# Patient Record
Sex: FEMALE | Race: WHITE | NOT HISPANIC OR LATINO | Employment: OTHER | URBAN - METROPOLITAN AREA
[De-identification: names, ages, dates, MRNs, and addresses within clinical notes are randomized per-mention and may not be internally consistent; named-entity substitution may affect disease eponyms.]

---

## 2017-01-11 DIAGNOSIS — I25.5 ISCHEMIC CARDIOMYOPATHY: ICD-10-CM

## 2017-01-11 LAB
ANION GAP SERPL CALCULATED.3IONS-SCNC: 9 MMOL/L (ref 3–14)
BUN SERPL-MCNC: 20 MG/DL (ref 7–30)
CALCIUM SERPL-MCNC: 9.6 MG/DL (ref 8.5–10.1)
CHLORIDE SERPL-SCNC: 103 MMOL/L (ref 94–109)
CO2 SERPL-SCNC: 27 MMOL/L (ref 20–32)
CREAT SERPL-MCNC: 0.83 MG/DL (ref 0.52–1.04)
GFR SERPL CREATININE-BSD FRML MDRD: 70 ML/MIN/1.7M2
GLUCOSE SERPL-MCNC: 165 MG/DL (ref 70–99)
POTASSIUM SERPL-SCNC: 3.8 MMOL/L (ref 3.4–5.3)
SODIUM SERPL-SCNC: 139 MMOL/L (ref 133–144)

## 2017-01-11 PROCEDURE — 36415 COLL VENOUS BLD VENIPUNCTURE: CPT | Performed by: INTERNAL MEDICINE

## 2017-01-11 PROCEDURE — 80048 BASIC METABOLIC PNL TOTAL CA: CPT | Performed by: INTERNAL MEDICINE

## 2017-03-27 ENCOUNTER — HOSPITAL ENCOUNTER (OUTPATIENT)
Dept: CARDIOLOGY | Facility: CLINIC | Age: 62
Discharge: HOME | End: 2017-03-27
Attending: INTERNAL MEDICINE | Admitting: INTERNAL MEDICINE
Payer: OTHER GOVERNMENT

## 2017-03-27 DIAGNOSIS — I10 BENIGN ESSENTIAL HYPERTENSION: ICD-10-CM

## 2017-03-27 DIAGNOSIS — I21.02 ST ELEVATION MYOCARDIAL INFARCTION INVOLVING LEFT ANTERIOR DESCENDING (LAD) CORONARY ARTERY (H): ICD-10-CM

## 2017-03-27 DIAGNOSIS — I25.5 ISCHEMIC CARDIOMYOPATHY: ICD-10-CM

## 2017-03-27 LAB
ALT SERPL W P-5'-P-CCNC: 28 U/L (ref 0–50)
ANION GAP SERPL CALCULATED.3IONS-SCNC: 7 MMOL/L (ref 3–14)
BUN SERPL-MCNC: 26 MG/DL (ref 7–30)
CALCIUM SERPL-MCNC: 9.2 MG/DL (ref 8.5–10.1)
CHLORIDE SERPL-SCNC: 104 MMOL/L (ref 94–109)
CHOLEST SERPL-MCNC: 139 MG/DL
CO2 SERPL-SCNC: 28 MMOL/L (ref 20–32)
CREAT SERPL-MCNC: 0.81 MG/DL (ref 0.52–1.04)
GFR SERPL CREATININE-BSD FRML MDRD: 72 ML/MIN/1.7M2
GLUCOSE SERPL-MCNC: 113 MG/DL (ref 70–99)
HDLC SERPL-MCNC: 68 MG/DL
LDLC SERPL CALC-MCNC: 48 MG/DL
NONHDLC SERPL-MCNC: 71 MG/DL
POTASSIUM SERPL-SCNC: 4 MMOL/L (ref 3.4–5.3)
SODIUM SERPL-SCNC: 139 MMOL/L (ref 133–144)
TRIGL SERPL-MCNC: 114 MG/DL

## 2017-03-27 PROCEDURE — 36415 COLL VENOUS BLD VENIPUNCTURE: CPT | Performed by: INTERNAL MEDICINE

## 2017-03-27 PROCEDURE — 93306 TTE W/DOPPLER COMPLETE: CPT

## 2017-03-27 PROCEDURE — 80061 LIPID PANEL: CPT | Performed by: INTERNAL MEDICINE

## 2017-03-27 PROCEDURE — 93306 TTE W/DOPPLER COMPLETE: CPT | Mod: 26 | Performed by: INTERNAL MEDICINE

## 2017-03-27 PROCEDURE — 80048 BASIC METABOLIC PNL TOTAL CA: CPT | Performed by: INTERNAL MEDICINE

## 2017-03-27 PROCEDURE — 84460 ALANINE AMINO (ALT) (SGPT): CPT | Performed by: INTERNAL MEDICINE

## 2017-04-04 ENCOUNTER — OFFICE VISIT (OUTPATIENT)
Dept: CARDIOLOGY | Facility: CLINIC | Age: 62
End: 2017-04-04
Attending: INTERNAL MEDICINE
Payer: OTHER GOVERNMENT

## 2017-04-04 VITALS
BODY MASS INDEX: 31.74 KG/M2 | HEIGHT: 62 IN | WEIGHT: 172.5 LBS | DIASTOLIC BLOOD PRESSURE: 60 MMHG | SYSTOLIC BLOOD PRESSURE: 112 MMHG | HEART RATE: 72 BPM

## 2017-04-04 DIAGNOSIS — I25.5 ISCHEMIC CARDIOMYOPATHY: ICD-10-CM

## 2017-04-04 DIAGNOSIS — I21.02 ST ELEVATION MYOCARDIAL INFARCTION INVOLVING LEFT ANTERIOR DESCENDING (LAD) CORONARY ARTERY (H): ICD-10-CM

## 2017-04-04 DIAGNOSIS — I10 BENIGN ESSENTIAL HYPERTENSION: ICD-10-CM

## 2017-04-04 DIAGNOSIS — I25.10 CORONARY ARTERY DISEASE INVOLVING NATIVE CORONARY ARTERY OF NATIVE HEART WITHOUT ANGINA PECTORIS: Primary | ICD-10-CM

## 2017-04-04 PROCEDURE — 99214 OFFICE O/P EST MOD 30 MIN: CPT | Performed by: INTERNAL MEDICINE

## 2017-04-04 NOTE — PROGRESS NOTES
HPI and Plan:   See dictation    Orders Placed This Encounter   Procedures     NM Exercise stress test (nuc card)     Lipid Profile     ALT     Basic metabolic panel     Follow-Up with Cardiologist       No orders of the defined types were placed in this encounter.      Medications Discontinued During This Encounter   Medication Reason     METFORMIN HCL PO Discontinued by another Health Care Provider     Omega-3 Fatty Acids (OMEGA-3 FISH OIL PO) Discontinued by another Health Care Provider         Encounter Diagnoses   Name Primary?     ST elevation myocardial infarction involving left anterior descending (LAD) coronary artery (H)      Benign essential hypertension      Ischemic cardiomyopathy      Coronary artery disease involving native coronary artery of native heart without angina pectoris Yes       CURRENT MEDICATIONS:  Current Outpatient Prescriptions   Medication Sig Dispense Refill     losartan (COZAAR) 25 MG tablet Take 1 tablet (25 mg) by mouth daily 30 tablet 0     ticagrelor (BRILINTA) 90 MG tablet Take 1 tablet (90 mg) by mouth every 12 hours 60 tablet 3     nitroglycerin (NITROSTAT) 0.4 MG SL tablet Place 1 tablet (0.4 mg) under the tongue every 5 minutes as needed for chest pain if you are still having symptoms after 3 doses (15 minutes) call 911. 25 tablet 0     atorvastatin (LIPITOR) 80 MG tablet Take 1 tablet (80 mg) by mouth daily 30 tablet 11     carvedilol (COREG) 3.125 MG tablet Take 1 tablet (3.125 mg) by mouth 2 times daily 60 tablet 11     furosemide (LASIX) 40 MG tablet Take 1 tablet (40 mg) by mouth daily 30 tablet 1     spironolactone (ALDACTONE) 25 MG tablet Take 0.5 tablets (12.5 mg) by mouth daily 30 tablet 1     VITAMIN D, CHOLECALCIFEROL, PO Take 2,000 Units by mouth daily       Biotin 2500 MCG CAPS Take 2,500 mcg by mouth daily       Ascorbic Acid (VITAMIN C PO) Take 1,000 mg by mouth daily       magnesium 250 MG tablet Take 1 tablet by mouth daily       ASPIRIN PO Take 81 mg by  mouth daily       Probiotic Product (PROBIOTIC PO) Take 1 capsule by mouth daily         ALLERGIES     Allergies   Allergen Reactions     Cellcept [Mycophenolate]      Levaquin [Levofloxacin]      Lisinopril Cough       PAST MEDICAL HISTORY:  Past Medical History:   Diagnosis Date     Benign essential hypertension      BOOP (bronchiolitis obliterans with organizing pneumonia) (H)      Breast CA (H)      CAD (coronary artery disease), native coronary artery     STEMI 8/2016  PCI mid LAD 8/22/16     DM (diabetes mellitus screen)      Dyslipidemia      Ex-smoker      Fatty liver disease, nonalcoholic      Fracture of left ankle      Ischemic cardiomyopathy      Sleep apnea     Cpap       PAST SURGICAL HISTORY:  Past Surgical History:   Procedure Laterality Date     STENT, CORONARY, S660 15/18  08/22/2016    MABEL=LAD       FAMILY HISTORY:  Family History   Problem Relation Age of Onset     Hypertension Mother      HEART DISEASE Father      DIABETES Brother      HEART DISEASE Brother        SOCIAL HISTORY:  Social History     Social History     Marital status:      Spouse name: N/A     Number of children: N/A     Years of education: N/A     Social History Main Topics     Smoking status: Former Smoker     Smokeless tobacco: None      Comment: quit 2001     Alcohol use 0.0 oz/week     0 Standard drinks or equivalent per week      Comment: a few glasses of wine weekly     Drug use: None     Sexual activity: Not Asked     Other Topics Concern     Parent/Sibling W/ Cabg, Mi Or Angioplasty Before 65f 55m? Yes     Caffeine Concern Yes     1-2  cup daily     Special Diet Yes     low NA     Exercise Yes     heart rehab, walk     Social History Narrative       Review of Systems:  Skin:  Positive for bruising     Eyes:  Positive for glasses;visual blurring having more Occular Migrains - has improved  ENT:  Negative      Respiratory:  Positive for dyspnea on exertion;sleep apnea;CPAP     Cardiovascular:    Positive for;fatigue  "\"somedays\"  Gastroenterology: Negative      Genitourinary:  Negative      Musculoskeletal:  Positive for back pain;foot pain;arthritis;joint pain upper back pain; hip pain - both hips  Neurologic:  Positive for numbness or tingling of feet;migraine headaches pain and tingling on top of both feet,  Occular migrains   Psychiatric:  Negative      Heme/Lymph/Imm:  Positive for easy bruising;allergies RX allergies   Endocrine:  Positive for diabetes      Physical Exam:  Vitals: /60 (BP Location: Right arm, Patient Position: Chair, Cuff Size: Adult Regular)  Pulse 72  Ht 1.575 m (5' 2\")  Wt 78.2 kg (172 lb 8 oz)  BMI 31.55 kg/m2    Constitutional:  cooperative, alert and oriented, well developed, well nourished, in no acute distress        Skin:  warm and dry to the touch, no apparent skin lesions or masses noted        Head:  normocephalic, no masses or lesions        Eyes:  pupils equal and round, conjunctivae and lids unremarkable, sclera white, no xanthalasma, EOMS intact, no nystagmus        ENT:  no pallor or cyanosis, dentition good        Neck:           Chest:  normal breath sounds, clear to auscultation, normal A-P diameter, normal symmetry, normal respiratory excursion, no use of accessory muscles          Cardiac: normal S1 and S2;regular rhythm;apical impulse not displaced   S4 no presence of murmur            Abdomen:  abdomen soft, non-tender, BS normoactive, no mass, no HSM, no bruits        Vascular: pulses full and equal, no bruits auscultated                                   left radial site intact without hum or bruit    Extremities and Back:  no deformities, clubbing, cyanosis, erythema observed              Neurological:  affect appropriate, oriented to time, person and place;no gross motor deficits              CC  Tim Heredia MD   [facility]  [address]  [place] [address]              "

## 2017-04-04 NOTE — LETTER
4/4/2017    GLORIASONDRA GUAMAN  [facility]   [address] [place] [address]    RE: Bernard NORIEGA Echo       Dear Colleague,    It was my pleasure to see your patient, Jada Dodge, in followup.  She is a 61-year-old female patient who suffered an anterior myocardial infarct on 08/22/2016.  She had a 90% stenosis in the left anterior descending artery followed by another 70% stenosis also in the left anterior descending artery after a second diagonal artery branch.  She long stent placed which covered both lesions with a 0% residual lesion.  She has 50%-60% disease in the circumflex and a 20%-30% stenosis in the right coronary artery.  Immediately after her myocardial infarct, her EF was 30%-35%.  Repeat echocardiography in October showed that her ejection fraction had risen to 40%-45%.        The patient feels well.  She does not have any chest pains or chest pressure.  Repeat echocardiography which was performed a week ago showed even further improvement in the ejection fraction, which is now in the 45%-50% range.  There is moderate apical wall hypokinesis with no thrombus present.        The patient's blood pressure is well controlled at 112/60.  She has tended to have a low blood pressure which is why we were unable to increase the dose of carvedilol.  She developed a dry hacking cough with lisinopril, which would represent classic ACE inhibitor-induced cough.  The lisinopril was stopped and was started on losartan, which eliminated the symptom.  She is also on spironolactone 12.5 mg per day.  Her potassium has been in the normal range and her potassium was 4.0 on 03/27 which was a week ago.  Her renal function is also normal with a GFR of 72 with a creatinine 0.81.      Her lipids are excellent.  Her LDL is 48, HDL is 68 and triglycerides are 114 with a total cholesterol of 139.  Liver function tests are normal with an ALT of 28.     Outpatient Encounter Prescriptions as of 4/4/2017   Medication Sig  Dispense Refill     losartan (COZAAR) 25 MG tablet Take 1 tablet (25 mg) by mouth daily 30 tablet 0     ticagrelor (BRILINTA) 90 MG tablet Take 1 tablet (90 mg) by mouth every 12 hours 60 tablet 3     nitroglycerin (NITROSTAT) 0.4 MG SL tablet Place 1 tablet (0.4 mg) under the tongue every 5 minutes as needed for chest pain if you are still having symptoms after 3 doses (15 minutes) call 911. 25 tablet 0     atorvastatin (LIPITOR) 80 MG tablet Take 1 tablet (80 mg) by mouth daily 30 tablet 11     carvedilol (COREG) 3.125 MG tablet Take 1 tablet (3.125 mg) by mouth 2 times daily 60 tablet 11     furosemide (LASIX) 40 MG tablet Take 1 tablet (40 mg) by mouth daily 30 tablet 1     spironolactone (ALDACTONE) 25 MG tablet Take 0.5 tablets (12.5 mg) by mouth daily 30 tablet 1     VITAMIN D, CHOLECALCIFEROL, PO Take 2,000 Units by mouth daily       Biotin 2500 MCG CAPS Take 2,500 mcg by mouth daily       Ascorbic Acid (VITAMIN C PO) Take 1,000 mg by mouth daily       magnesium 250 MG tablet Take 1 tablet by mouth daily       ASPIRIN PO Take 81 mg by mouth daily       Probiotic Product (PROBIOTIC PO) Take 1 capsule by mouth daily       [DISCONTINUED] Omega-3 Fatty Acids (OMEGA-3 FISH OIL PO) Take 1 g by mouth daily       [DISCONTINUED] METFORMIN HCL PO Take 1,000 mg by mouth daily (with breakfast)        No facility-administered encounter medications on file as of 4/4/2017.       IMPRESSION:   1.  Asymptomatic with respect to coronary artery disease.   2.  Mild ischemic cardiomyopathy with further improvement in the ejection fraction to 45%-50%.   3.  Excellent lipid profile.   4.  Normotensive.     5.  No evidence of renal insufficiency or hyperkalemia with spironolactone.      PLAN: We will perform a stress nuclear scan in 6 months' time, which will be 1 year after intervention to follow her stent but also the 60% lesion in her circumflex.  The patient should stop the ticagrelor on 08/22, which will be 1 year after her  intervention.  The aspirin should be continued as well as the other medications.  I will see her back again in 6 months' time.  At that stage, I will also check a basic metabolic profile because she is on chronic spironolactone therapy.        It has been a pleasure to be involved in the care of this very nice patient and as always, she has been told to contact me if she has any questions or any concerns.     Sincerely,    Tim Heredia MD    [facility]

## 2017-04-04 NOTE — MR AVS SNAPSHOT
After Visit Summary   4/4/2017    Bernard Dodge    MRN: [id number]           Patient Information     Date Of Birth          1955        Visit Information        Provider Department      4/4/2017 3:15 PM Tim Heredia MD [facility] [place]        Today's Diagnoses     Coronary artery disease involving native coronary artery of native heart without angina pectoris    -  1    ST elevation myocardial infarction involving left anterior descending (LAD) coronary artery (H)        Benign essential hypertension        Ischemic cardiomyopathy           Follow-ups after your visit        Additional Services     Follow-Up with Cardiologist                 Future tests that were ordered for you today     Open Future Orders        Priority Expected Expires Ordered    NM Exercise stress test (nuc card) Routine 10/1/2017 4/4/2018 4/4/2017    Follow-Up with Cardiologist Routine 10/1/2017 4/4/2018 4/4/2017    Lipid Profile Routine 10/1/2017 4/4/2018 4/4/2017    ALT Routine 10/1/2017 4/4/2018 4/4/2017    Basic metabolic panel Routine 10/1/2017 4/4/2018 4/4/2017            Who to contact     If you have questions or need follow up information about today's clinic visit or your schedule please contact [facility] directly at [phone].  Normal or non-critical lab and imaging results will be communicated to you by MyChart, letter or phone within 4 business days after the clinic has received the results. If you do not hear from us within 7 days, please contact the clinic through MyChart or phone. If you have a critical or abnormal lab result, we will notify you by phone as soon as possible.  Submit refill requests through [facility] or call your pharmacy and they will forward the refill request to us. Please allow 3 business days for your refill to be completed.          Additional Information About Your Visit        MyChart  "Information     [facility] gives you secure access to your electronic health record. If you see a primary care provider, you can also send messages to your care team and make appointments. If you have questions, please call your primary care clinic.  If you do not have a primary care provider, please call [phone] and they will assist you.        Care EveryWhere ID     This is your Care EveryWhere ID. This could be used by other organizations to access your [place] medical records  [id number]        Your Vitals Were     Pulse Height BMI (Body Mass Index)             72 1.575 m (5' 2\") 31.55 kg/m2          Blood Pressure from Last 3 Encounters:   04/04/17 112/60   10/18/16 108/66   09/23/16 114/62    Weight from Last 3 Encounters:   04/04/17 78.2 kg (172 lb 8 oz)   12/08/16 75.9 kg (167 lb 6.4 oz)   11/14/16 77.6 kg (171 lb)              We Performed the Following     Follow-Up with Cardiologist        Primary Care Provider Office Phone # Fax #    Margarita Oh [phone]       [facility] [address] [place] [address]        Thank you!     Thank you for choosing [facility] [place]  for your care. Our goal is always to provide you with excellent care. Hearing back from our patients is one way we can continue to improve our services. Please take a few minutes to complete the written survey that you may receive in the mail after your visit with us. Thank you!             Your Updated Medication List - Protect others around you: Learn how to safely use, store and throw away your medicines at www.disposemymeds.org.          This list is accurate as of: 4/4/17  3:49 PM.  Always use your most recent med list.                   Brand Name Dispense Instructions for use    ASPIRIN PO      Take 81 mg by mouth daily       atorvastatin 80 MG tablet    LIPITOR    30 tablet    Take 1 tablet (80 mg) by mouth daily       Biotin 2500 MCG Caps      Take 2,500 mcg by " mouth daily       carvedilol 3.125 MG tablet    COREG    60 tablet    Take 1 tablet (3.125 mg) by mouth 2 times daily       furosemide 40 MG tablet    LASIX    30 tablet    Take 1 tablet (40 mg) by mouth daily       losartan 25 MG tablet    COZAAR    30 tablet    Take 1 tablet (25 mg) by mouth daily       magnesium 250 MG tablet      Take 1 tablet by mouth daily       nitroglycerin 0.4 MG sublingual tablet    NITROSTAT    25 tablet    Place 1 tablet (0.4 mg) under the tongue every 5 minutes as needed for chest pain if you are still having symptoms after 3 doses (15 minutes) call 911.       PROBIOTIC PO      Take 1 capsule by mouth daily       spironolactone 25 MG tablet    ALDACTONE    30 tablet    Take 0.5 tablets (12.5 mg) by mouth daily       ticagrelor 90 MG tablet    BRILINTA    60 tablet    Take 1 tablet (90 mg) by mouth every 12 hours       VITAMIN C PO      Take 1,000 mg by mouth daily       VITAMIN D (CHOLECALCIFEROL) PO      Take 2,000 Units by mouth daily

## 2017-04-05 NOTE — PROGRESS NOTES
HISTORY OF PRESENT ILLNESS:  It was my pleasure to see your patient, Jada Dodge, in followup.  She is a 61-year-old female patient who suffered an anterior myocardial infarct on 08/22/2016.  She had a 90% stenosis in the left anterior descending artery followed by another 70% stenosis also in the left anterior descending artery after a second diagonal artery branch.  She long stent placed which covered both lesions with a 0% residual lesion.  She has 50%-60% disease in the circumflex and a 20%-30% stenosis in the right coronary artery.  Immediately after her myocardial infarct, her EF was 30%-35%.  Repeat echocardiography in October showed that her ejection fraction had risen to 40%-45%.        The patient feels well.  She does not have any chest pains or chest pressure.  Repeat echocardiography which was performed a week ago showed even further improvement in the ejection fraction, which is now in the 45%-50% range.  There is moderate apical wall hypokinesis with no thrombus present.        The patient's blood pressure is well controlled at 112/60.  She has tended to have a low blood pressure which is why we were unable to increase the dose of carvedilol.  She developed a dry hacking cough with lisinopril, which would represent classic ACE inhibitor-induced cough.  The lisinopril was stopped and was started on losartan, which eliminated the symptom.  She is also on spironolactone 12.5 mg per day.  Her potassium has been in the normal range and her potassium was 4.0 on 03/27 which was a week ago.  Her renal function is also normal with a GFR of 72 with a creatinine 0.81.      Her lipids are excellent.  Her LDL is 48, HDL is 68 and triglycerides are 114 with a total cholesterol of 139.  Liver function tests are normal with an ALT of 28.      IMPRESSION:   1.  Asymptomatic with respect to coronary artery disease.   2.  Mild ischemic cardiomyopathy with further improvement in the ejection fraction to 45%-50%.   3.   Excellent lipid profile.   4.  Normotensive.     5.  No evidence of renal insufficiency or hyperkalemia with spironolactone.      PLAN: We will perform a stress nuclear scan in 6 months' time, which will be 1 year after intervention to follow her stent but also the 60% lesion in her circumflex.  The patient should stop the ticagrelor on , which will be 1 year after her intervention.  The aspirin should be continued as well as the other medications.  I will see her back again in 6 months' time.  At that stage, I will also check a basic metabolic profile because she is on chronic spironolactone therapy.        It has been a pleasure to be involved in the care of this very nice patient and as always, she has been told to contact me if she has any questions or any concerns.      cc:   Margarita Mcelroy MD    [facility] [address], [place] [address]         MARISOL GUSTAFSON MD, [facility]             D: 2017 15:43   T: 2017 09:24   MT: BRITTANEY      Name:     AVERY RICKETTS   MRN:      [id number]        Account:      [id number]   :      1955           Service Date: 2017      Document: [id number]

## 2017-06-24 ENCOUNTER — HEALTH MAINTENANCE LETTER (OUTPATIENT)
Age: 62
End: 2017-06-24

## 2017-07-14 ENCOUNTER — TELEPHONE (OUTPATIENT)
Dept: CARDIOLOGY | Facility: CLINIC | Age: 62
End: 2017-07-14

## 2017-07-14 DIAGNOSIS — I10 HTN (HYPERTENSION): ICD-10-CM

## 2017-07-14 RX ORDER — LOSARTAN POTASSIUM 25 MG/1
25 TABLET ORAL DAILY
Qty: 90 TABLET | Refills: 0 | Status: SHIPPED | OUTPATIENT
Start: 2017-07-14 | End: 2017-10-16

## 2017-07-14 NOTE — TELEPHONE ENCOUNTER
Called pt, states she had a nuclear stress test before that she felt like a self induced heart attack. Pt advised these are not symptoms she should have with this test. Pt states she will schedule the test and will damian scheduling on Monday to arrange. LPenfield RN

## 2017-07-14 NOTE — TELEPHONE ENCOUNTER
----- Message from Isabelle Diallo sent at 7/13/2017  3:58 PM CDT -----  Regarding: NUCLEAR STRESS TEST  Patient questioning why she needs nuclear stress test when she is able to walk on the treadmill.

## 2017-08-03 ENCOUNTER — HOSPITAL ENCOUNTER (OUTPATIENT)
Dept: NUCLEAR MEDICINE | Facility: CLINIC | Age: 62
Setting detail: NUCLEAR MEDICINE
End: 2017-08-03
Attending: INTERNAL MEDICINE
Payer: OTHER GOVERNMENT

## 2017-08-03 ENCOUNTER — HOSPITAL ENCOUNTER (OUTPATIENT)
Dept: CARDIOLOGY | Facility: CLINIC | Age: 62
Discharge: HOME | End: 2017-08-03
Attending: INTERNAL MEDICINE | Admitting: INTERNAL MEDICINE
Payer: OTHER GOVERNMENT

## 2017-08-03 DIAGNOSIS — I21.02 ST ELEVATION MYOCARDIAL INFARCTION INVOLVING LEFT ANTERIOR DESCENDING (LAD) CORONARY ARTERY (H): ICD-10-CM

## 2017-08-03 PROCEDURE — 93016 CV STRESS TEST SUPVJ ONLY: CPT | Performed by: INTERNAL MEDICINE

## 2017-08-03 PROCEDURE — A9502 TC99M TETROFOSMIN: HCPCS | Performed by: INTERNAL MEDICINE

## 2017-08-03 PROCEDURE — 78452 HT MUSCLE IMAGE SPECT MULT: CPT | Performed by: INTERNAL MEDICINE

## 2017-08-03 PROCEDURE — 78452 HT MUSCLE IMAGE SPECT MULT: CPT

## 2017-08-03 PROCEDURE — 34300033 ZZH RX 343: Performed by: INTERNAL MEDICINE

## 2017-08-03 PROCEDURE — 93017 CV STRESS TEST TRACING ONLY: CPT

## 2017-08-03 PROCEDURE — 93018 CV STRESS TEST I&R ONLY: CPT | Performed by: INTERNAL MEDICINE

## 2017-08-03 RX ADMIN — TETROFOSMIN 30 MCI.: 1.38 INJECTION, POWDER, LYOPHILIZED, FOR SOLUTION INTRAVENOUS at 10:09

## 2017-08-03 RX ADMIN — TETROFOSMIN 10.6 MCI.: 1.38 INJECTION, POWDER, LYOPHILIZED, FOR SOLUTION INTRAVENOUS at 07:59

## 2017-08-15 ENCOUNTER — TELEPHONE (OUTPATIENT)
Dept: CARDIOLOGY | Facility: CLINIC | Age: 62
End: 2017-08-15

## 2017-08-15 NOTE — TELEPHONE ENCOUNTER
Reviewed NM stress test showing   1.  Myocardial perfusion imaging using single isotope technique demonstrated moderate size transmural scar of the mid and distal anterior, anteroseptal apical portion of the left ventricle without lizbeth-infarct ischemia. This appears to be in the LAD distribution..   2. Gated images demonstrated normal left size left ventricle with mid and distal anterior, anteroseptal apical akinesis.  The left ventricular systolic function is overestimated with ejection fraction 63%.  3. Compared to the prior study from  .    Pt has office visit 8/31/17; will message Dr. Heredia to review. LPenfield RN

## 2017-08-17 NOTE — TELEPHONE ENCOUNTER
Attempted to call pt with results & recommendations left message for pt to call back. LPenfield RN

## 2017-08-21 NOTE — TELEPHONE ENCOUNTER
Attempted to call pt with results, left message that stress test showed no change, advised her to keep her apt as scheduled 8/31/17 & to call back with questions or concerns. LPenfield RN

## 2017-08-28 ENCOUNTER — PRE VISIT (OUTPATIENT)
Dept: CARDIOLOGY | Facility: CLINIC | Age: 62
End: 2017-08-28

## 2017-08-31 ENCOUNTER — OFFICE VISIT (OUTPATIENT)
Dept: CARDIOLOGY | Facility: CLINIC | Age: 62
End: 2017-08-31
Attending: INTERNAL MEDICINE
Payer: OTHER GOVERNMENT

## 2017-08-31 VITALS
BODY MASS INDEX: 32.37 KG/M2 | HEART RATE: 74 BPM | HEIGHT: 62 IN | DIASTOLIC BLOOD PRESSURE: 64 MMHG | SYSTOLIC BLOOD PRESSURE: 108 MMHG | WEIGHT: 175.9 LBS

## 2017-08-31 DIAGNOSIS — I10 BENIGN ESSENTIAL HYPERTENSION: ICD-10-CM

## 2017-08-31 DIAGNOSIS — I21.02 ST ELEVATION MYOCARDIAL INFARCTION INVOLVING LEFT ANTERIOR DESCENDING (LAD) CORONARY ARTERY (H): ICD-10-CM

## 2017-08-31 DIAGNOSIS — I25.5 ISCHEMIC CARDIOMYOPATHY: ICD-10-CM

## 2017-08-31 DIAGNOSIS — I25.10 CORONARY ARTERY DISEASE INVOLVING NATIVE CORONARY ARTERY OF NATIVE HEART WITHOUT ANGINA PECTORIS: ICD-10-CM

## 2017-08-31 LAB
ALT SERPL W P-5'-P-CCNC: 32 U/L (ref 0–50)
ANION GAP SERPL CALCULATED.3IONS-SCNC: 7 MMOL/L (ref 3–14)
BUN SERPL-MCNC: 21 MG/DL (ref 7–30)
CALCIUM SERPL-MCNC: 9 MG/DL (ref 8.5–10.1)
CHLORIDE SERPL-SCNC: 107 MMOL/L (ref 94–109)
CHOLEST SERPL-MCNC: 136 MG/DL
CO2 SERPL-SCNC: 27 MMOL/L (ref 20–32)
CREAT SERPL-MCNC: 0.75 MG/DL (ref 0.52–1.04)
GFR SERPL CREATININE-BSD FRML MDRD: 78 ML/MIN/1.7M2
GLUCOSE SERPL-MCNC: 121 MG/DL (ref 70–99)
HDLC SERPL-MCNC: 75 MG/DL
LDLC SERPL CALC-MCNC: 49 MG/DL
NONHDLC SERPL-MCNC: 61 MG/DL
POTASSIUM SERPL-SCNC: 4.3 MMOL/L (ref 3.4–5.3)
SODIUM SERPL-SCNC: 141 MMOL/L (ref 133–144)
TRIGL SERPL-MCNC: 59 MG/DL

## 2017-08-31 PROCEDURE — 80048 BASIC METABOLIC PNL TOTAL CA: CPT | Performed by: INTERNAL MEDICINE

## 2017-08-31 PROCEDURE — 36415 COLL VENOUS BLD VENIPUNCTURE: CPT | Performed by: INTERNAL MEDICINE

## 2017-08-31 PROCEDURE — 84460 ALANINE AMINO (ALT) (SGPT): CPT | Performed by: INTERNAL MEDICINE

## 2017-08-31 PROCEDURE — 80061 LIPID PANEL: CPT | Performed by: INTERNAL MEDICINE

## 2017-08-31 PROCEDURE — 99214 OFFICE O/P EST MOD 30 MIN: CPT | Performed by: INTERNAL MEDICINE

## 2017-08-31 NOTE — PROGRESS NOTES
HPI and Plan:   See dictation    Orders Placed This Encounter   Procedures     Lipid Profile     ALT     Basic metabolic panel     Follow-Up with Cardiologist     Echocardiogram       Orders Placed This Encounter   Medications     aspirin 81 MG tablet     Sig: Take 1 tablet (81 mg) by mouth daily     Dispense:  30 tablet       Medications Discontinued During This Encounter   Medication Reason     ASPIRIN PO Discontinued by another Health Care Provider     Biotin 2500 MCG CAPS Stopped by Patient     ticagrelor (BRILINTA) 90 MG tablet          Encounter Diagnoses   Name Primary?     ST elevation myocardial infarction involving left anterior descending (LAD) coronary artery (H)      Benign essential hypertension      Ischemic cardiomyopathy      Coronary artery disease involving native coronary artery of native heart without angina pectoris        CURRENT MEDICATIONS:  Current Outpatient Prescriptions   Medication Sig Dispense Refill     aspirin 81 MG tablet Take 1 tablet (81 mg) by mouth daily 30 tablet      losartan (COZAAR) 25 MG tablet Take 1 tablet (25 mg) by mouth daily 90 tablet 0     nitroglycerin (NITROSTAT) 0.4 MG SL tablet Place 1 tablet (0.4 mg) under the tongue every 5 minutes as needed for chest pain if you are still having symptoms after 3 doses (15 minutes) call 911. 25 tablet 0     atorvastatin (LIPITOR) 80 MG tablet Take 1 tablet (80 mg) by mouth daily 30 tablet 11     carvedilol (COREG) 3.125 MG tablet Take 1 tablet (3.125 mg) by mouth 2 times daily 60 tablet 11     furosemide (LASIX) 40 MG tablet Take 1 tablet (40 mg) by mouth daily 30 tablet 1     spironolactone (ALDACTONE) 25 MG tablet Take 0.5 tablets (12.5 mg) by mouth daily 30 tablet 1     VITAMIN D, CHOLECALCIFEROL, PO Take 2,000 Units by mouth daily       Ascorbic Acid (VITAMIN C PO) Take 1,000 mg by mouth daily       magnesium 250 MG tablet Take 1 tablet by mouth daily       Probiotic Product (PROBIOTIC PO) Take 1 capsule by mouth daily          ALLERGIES     Allergies   Allergen Reactions     Cellcept [Mycophenolate]      Levaquin [Levofloxacin]      Lisinopril Cough       PAST MEDICAL HISTORY:  Past Medical History:   Diagnosis Date     Benign essential hypertension      BOOP (bronchiolitis obliterans with organizing pneumonia) (H)      Breast CA (H)      CAD (coronary artery disease), native coronary artery     STEMI 8/2016  PCI mid LAD 8/22/16     DM (diabetes mellitus screen)      Dyslipidemia      Ex-smoker      Fatty liver disease, nonalcoholic      Fracture of left ankle      Ischemic cardiomyopathy      Sleep apnea     Cpap       PAST SURGICAL HISTORY:  Past Surgical History:   Procedure Laterality Date     HEART CATH, ANGIOPLASTY       STENT, CORONARY, S660 15/18  08/22/2016    MABEL=LAD       FAMILY HISTORY:  Family History   Problem Relation Age of Onset     Hypertension Mother      Myocardial Infarction Mother      HEART DISEASE Father      DIABETES Brother      HEART DISEASE Brother        SOCIAL HISTORY:  Social History     Social History     Marital status:      Spouse name: N/A     Number of children: N/A     Years of education: N/A     Social History Main Topics     Smoking status: Former Smoker     Smokeless tobacco: None      Comment: quit 2001     Alcohol use 0.0 oz/week     0 Standard drinks or equivalent per week      Comment: a few glasses of wine weekly     Drug use: None     Sexual activity: Not Asked     Other Topics Concern     Parent/Sibling W/ Cabg, Mi Or Angioplasty Before 65f 55m? Yes     Caffeine Concern Yes     1-2  cup daily     Special Diet Yes     low NA     Exercise Yes     heart rehab, walk     Social History Narrative       Review of Systems:  Skin:  Positive for bruising dry skin   Eyes:  Positive for glasses    ENT:  Negative      Respiratory:  Positive for dyspnea on exertion;sleep apnea;CPAP hills   Cardiovascular:    Positive for;lightheadedness    Gastroenterology: Negative      Genitourinary:   "Negative      Musculoskeletal:  Positive for back pain;foot pain;arthritis;joint pain upper back pain  Neurologic:  Positive for numbness or tingling of feet neuropathy - pain and tingling on top of both feet  Psychiatric:  Negative      Heme/Lymph/Imm:  Positive for easy bruising;allergies RX allergies   Endocrine:  Positive for diabetes      Physical Exam:  Vitals: /64 (BP Location: Right arm, Patient Position: Chair, Cuff Size: Adult Regular)  Pulse 74  Ht 1.575 m (5' 2\")  Wt 79.8 kg (175 lb 14.4 oz)  BMI 32.17 kg/m2    Constitutional:  cooperative, alert and oriented, well developed, well nourished, in no acute distress        Skin:  warm and dry to the touch, no apparent skin lesions or masses noted        Head:  normocephalic, no masses or lesions        Eyes:  pupils equal and round, conjunctivae and lids unremarkable, sclera white, no xanthalasma, EOMS intact, no nystagmus        ENT:  no pallor or cyanosis, dentition good        Neck:           Chest:  normal breath sounds, clear to auscultation, normal A-P diameter, normal symmetry, normal respiratory excursion, no use of accessory muscles          Cardiac: normal S1 and S2;regular rhythm;apical impulse not displaced   S4 no presence of murmur            Abdomen:  abdomen soft, non-tender, BS normoactive, no mass, no HSM, no bruits        Vascular: pulses full and equal, no bruits auscultated                                   left radial site intact without hum or bruit    Extremities and Back:  no deformities, clubbing, cyanosis, erythema observed              Neurological:  affect appropriate, oriented to time, person and place;no gross motor deficits              CC  Tim Heredia MD  [address]  [place] [id number]              "

## 2017-08-31 NOTE — MR AVS SNAPSHOT
After Visit Summary   8/31/2017    Bernard Dogde    MRN: [id number]           Patient Information     Date Of Birth          1955        Visit Information        Provider Department      8/31/2017 9:15 AM Tim Heredia MD [facility]        Today's Diagnoses     ST elevation myocardial infarction involving left anterior descending (LAD) coronary artery (H)        Benign essential hypertension        Ischemic cardiomyopathy        Coronary artery disease involving native coronary artery of native heart without angina pectoris           Follow-ups after your visit        Additional Services     Follow-Up with Cardiologist                 Future tests that were ordered for you today     Open Future Orders        Priority Expected Expires Ordered    Lipid Profile Routine 8/31/2018 8/31/2018 8/31/2017    ALT Routine 8/31/2018 8/31/2018 8/31/2017    Basic metabolic panel Routine 8/31/2018 9/1/2018 8/31/2017    Echocardiogram Routine 8/31/2018 9/1/2018 8/31/2017    Follow-Up with Cardiologist Routine 8/31/2018 9/1/2018 8/31/2017            Who to contact     If you have questions or need follow up information about today's clinic visit or your schedule please contact [facility] directly at [phone].  Normal or non-critical lab and imaging results will be communicated to you by MyChart, letter or phone within 4 business days after the clinic has received the results. If you do not hear from us within 7 days, please contact the clinic through MyChart or phone. If you have a critical or abnormal lab result, we will notify you by phone as soon as possible.  Submit refill requests through [employer] or call your pharmacy and they will forward the refill request to us. Please allow 3 business days for your refill to be completed.          Additional Information About Your Visit        MyChart Information      "indeni gives you secure access to your electronic health record. If you see a primary care provider, you can also send messages to your care team and make appointments. If you have questions, please call your primary care clinic.  If you do not have a primary care provider, please call [phone] and they will assist you.        Care EveryWhere ID     This is your Care EveryWhere ID. This could be used by other organizations to access your [facility] medical records  [id number]        Your Vitals Were     Pulse Height BMI (Body Mass Index)             74 1.575 m (5' 2\") 32.17 kg/m2          Blood Pressure from Last 3 Encounters:   08/31/17 108/64   04/04/17 112/60   10/18/16 108/66    Weight from Last 3 Encounters:   08/31/17 79.8 kg (175 lb 14.4 oz)   04/04/17 78.2 kg (172 lb 8 oz)   12/08/16 75.9 kg (167 lb 6.4 oz)              We Performed the Following     Follow-Up with Cardiologist          Today's Medication Changes          These changes are accurate as of: 8/31/17  9:37 AM.  If you have any questions, ask your nurse or doctor.               Start taking these medicines.        Dose/Directions    aspirin 81 MG tablet   Used for:  ST elevation myocardial infarction involving left anterior descending (LAD) coronary artery (H)   Started by:  Tim Heredia MD        Dose:  81 mg   Take 1 tablet (81 mg) by mouth daily   Quantity:  30 tablet   Refills:  0         Stop taking these medicines if you haven't already. Please contact your care team if you have questions.     ticagrelor 90 MG tablet   Commonly known as:  BRILINTA   Stopped by:  Tim Heredia MD                Where to get your medicines      Some of these will need a paper prescription and others can be bought over the counter.  Ask your nurse if you have questions.     You don't need a prescription for these medications     aspirin 81 MG tablet                Primary Care Provider Office Phone # Fax #    Margarita Oh " [phone]       [facility] [address] [place] [address]        Equal Access to Services     CAT STALLWORTH : Hadii aad ku haddaraang Villavicencio, waabhijeetda aydenicholasha, qanirta kaosmarda keya, justice doherty lienmarichuy miranda laJassdonnie bangura. So [facility] [phone].    ATENCIÓN: Si habla español, tiene a best disposición servicios gratuitos de asistencia lingüística. Llame al [phone].    We comply with applicable federal civil rights laws and [place] laws. We do not discriminate on the basis of race, color, national origin, age, disability sex, sexual orientation or gender identity.            Thank you!     Thank you for choosing [facility]  for your care. Our goal is always to provide you with excellent care. Hearing back from our patients is one way we can continue to improve our services. Please take a few minutes to complete the written survey that you may receive in the mail after your visit with us. Thank you!             Your Updated Medication List - Protect others around you: Learn how to safely use, store and throw away your medicines at www.disposemymeds.org.          This list is accurate as of: 8/31/17  9:37 AM.  Always use your most recent med list.                   Brand Name Dispense Instructions for use Diagnosis    aspirin 81 MG tablet     30 tablet    Take 1 tablet (81 mg) by mouth daily    ST elevation myocardial infarction involving left anterior descending (LAD) coronary artery (H)       atorvastatin 80 MG tablet    LIPITOR    30 tablet    Take 1 tablet (80 mg) by mouth daily    ST elevation myocardial infarction involving left anterior descending (LAD) coronary artery (H)       carvedilol 3.125 MG tablet    COREG    60 tablet    Take 1 tablet (3.125 mg) by mouth 2 times daily    ST elevation myocardial infarction involving left anterior descending (LAD) coronary artery (H)       furosemide 40 MG tablet    LASIX    30 tablet     Take 1 tablet (40 mg) by mouth daily    ST elevation myocardial infarction involving left anterior descending (LAD) coronary artery (H)       losartan 25 MG tablet    COZAAR    90 tablet    Take 1 tablet (25 mg) by mouth daily    HTN (hypertension)       magnesium 250 MG tablet      Take 1 tablet by mouth daily        nitroGLYcerin 0.4 MG sublingual tablet    NITROSTAT    25 tablet    Place 1 tablet (0.4 mg) under the tongue every 5 minutes as needed for chest pain if you are still having symptoms after 3 doses (15 minutes) call 911.    ST elevation myocardial infarction involving left anterior descending (LAD) coronary artery (H)       PROBIOTIC PO      Take 1 capsule by mouth daily        spironolactone 25 MG tablet    ALDACTONE    30 tablet    Take 0.5 tablets (12.5 mg) by mouth daily    ST elevation myocardial infarction involving left anterior descending (LAD) coronary artery (H)       VITAMIN C PO      Take 1,000 mg by mouth daily        VITAMIN D (CHOLECALCIFEROL) PO      Take 2,000 Units by mouth daily

## 2017-08-31 NOTE — LETTER
8/31/2017    [place]  [place]  [facility]   [address] [place] [address]    RE: Bernard Dodge       Dear Colleague,    I had the pleasure of seeing Bernard Dodge in the [facility].    It was my pleasure to see your patient, Jada Dodge, who 1 year ago suffered an acute anterior myocardial infarct and underwent stenting of 2 tight lesions in the left anterior descending artery.  She also had moderate disease in the circumflex and mild disease in the right coronary artery.  She had significant left ventricular dysfunction in the early days after the myocardial infarct with an ejection fraction in the 30%-35% range.  She had an echocardiogram performed at the end of 03/2017, which showed the ejection fraction had significantly improved to 45%-50%.  She had a nuclear stress test performed approximately 3-1/2 weeks ago, and this showed that her ejection fraction was 63% based upon the first pass assessment of the ejection fraction.  It was felt that there was a transmural scar in the mid to distal anterior wall, anteroseptal apical portion of the left ventricle without lizbeth-infarct ischemia.  In particular, there was no evidence of ischemia in the lateral wall showing that the circumflex lesion is not flow limiting and also there was no evidence of inferior ischemia, also indicating that the right coronary artery lesion is not flow limiting.      Her lipid profile was excellent with an LDL of 49, HDL of 75 and triglycerides of 59.  Her liver function tests were normal with an ALT of 32.  Her renal function is also normal.        She is on spironolactone therapy as she did have a significant anterior myocardial infarct and the use of spironolactone is a class 1 indication in this setting.        Echocardiography in the past has shown that she has raised filling pressures based upon the tissue Doppler analysis and for that reason she has been on furosemide 40 mg per day.   She has not had any issues with either spironolactone or furosemide.  As I mentioned earlier, her renal function is normal and her potassium is also normal at 4.3.  Her blood pressure is at the lower limits of normal at 108/64.  She gets occasional episodes of dizziness but in general she can exercise and she can stand and walk without any problems with dizziness.  She only occasionally notices it when she is sitting down playing cards.     Outpatient Encounter Prescriptions as of 8/31/2017   Medication Sig Dispense Refill     aspirin 81 MG tablet Take 1 tablet (81 mg) by mouth daily 30 tablet      losartan (COZAAR) 25 MG tablet Take 1 tablet (25 mg) by mouth daily 90 tablet 0     nitroglycerin (NITROSTAT) 0.4 MG SL tablet Place 1 tablet (0.4 mg) under the tongue every 5 minutes as needed for chest pain if you are still having symptoms after 3 doses (15 minutes) call 911. 25 tablet 0     atorvastatin (LIPITOR) 80 MG tablet Take 1 tablet (80 mg) by mouth daily 30 tablet 11     carvedilol (COREG) 3.125 MG tablet Take 1 tablet (3.125 mg) by mouth 2 times daily 60 tablet 11     furosemide (LASIX) 40 MG tablet Take 1 tablet (40 mg) by mouth daily 30 tablet 1     spironolactone (ALDACTONE) 25 MG tablet Take 0.5 tablets (12.5 mg) by mouth daily 30 tablet 1     VITAMIN D, CHOLECALCIFEROL, PO Take 2,000 Units by mouth daily       Ascorbic Acid (VITAMIN C PO) Take 1,000 mg by mouth daily       magnesium 250 MG tablet Take 1 tablet by mouth daily       Probiotic Product (PROBIOTIC PO) Take 1 capsule by mouth daily       [DISCONTINUED] ticagrelor (BRILINTA) 90 MG tablet Take 1 tablet (90 mg) by mouth every 12 hours 60 tablet 3     [DISCONTINUED] Biotin 2500 MCG CAPS Take 2,500 mcg by mouth daily       [DISCONTINUED] ASPIRIN PO Take 81 mg by mouth daily       No facility-administered encounter medications on file as of 8/31/2017.       IMPRESSION:   1.  Ischemic cardiomyopathy.  Her ejection fraction appears to have  significantly improved from the time of her myocardial infarct.   2.  Normotensive.   3.  Excellent lipid profile.   4.  Nuclear stress test shows no objective evidence of ischemia with evidence of prior anteroseptal myocardial infarct.   5.  Normal renal function and normal potassium on spironolactone and furosemide.   6.  Evidence of increased filling pressures in the past on echocardiography and for this reason she is on furosemide.  She has no symptoms of congestive heart failure.      PLAN:  We will continue the patient on her present medications except we will stop the Brilinta medication as she is past the 1 year anniversary of her myocardial infarct and stenting.  I will see the patient back again in 1 year's time.  At that time I will repeat her lipids, her basic metabolic profile and her echocardiogram.        As always, she has been told to contact me if she has any questions or any concerns.  I did reiterate her the importance of taking aspirin.  She may have not been taking aspirin while on the Brilinta.  I certainly did not give any instructions for her to stop the aspirin while on Brilinta.  Most of the data from dual antiplatelet therapy is essentially that dual antiplatelet therapy, not single antiplatelet therapy.      Many thanks for allowing me to be involved in the care of this extremely nice patient.     Again, thank you for allowing me to participate in the care of your patient.      Sincerely,    Tim Heredia MD    [facility]

## 2017-08-31 NOTE — PROGRESS NOTES
HISTORY OF PRESENT ILLNESS:  It was my pleasure to see your patient, Jada Dodge, who 1 year ago suffered an acute anterior myocardial infarct and underwent stenting of 2 tight lesions in the left anterior descending artery.  She also had moderate disease in the circumflex and mild disease in the right coronary artery.  She had significant left ventricular dysfunction in the early days after the myocardial infarct with an ejection fraction in the 30%-35% range.  She had an echocardiogram performed at the end of 03/2017, which showed the ejection fraction had significantly improved to 45%-50%.  She had a nuclear stress test performed approximately 3-1/2 weeks ago, and this showed that her ejection fraction was 63% based upon the first pass assessment of the ejection fraction.  It was felt that there was a transmural scar in the mid to distal anterior wall, anteroseptal apical portion of the left ventricle without lizbeth-infarct ischemia.  In particular, there was no evidence of ischemia in the lateral wall showing that the circumflex lesion is not flow limiting and also there was no evidence of inferior ischemia, also indicating that the right coronary artery lesion is not flow limiting.      Her lipid profile was excellent with an LDL of 49, HDL of 75 and triglycerides of 59.  Her liver function tests were normal with an ALT of 32.  Her renal function is also normal.        She is on spironolactone therapy as she did have a significant anterior myocardial infarct and the use of spironolactone is a class 1 indication in this setting.        Echocardiography in the past has shown that she has raised filling pressures based upon the tissue Doppler analysis and for that reason she has been on furosemide 40 mg per day.  She has not had any issues with either spironolactone or furosemide.  As I mentioned earlier, her renal function is normal and her potassium is also normal at 4.3.  Her blood pressure is at the lower  limits of normal at 108/64.  She gets occasional episodes of dizziness but in general she can exercise and she can stand and walk without any problems with dizziness.  She only occasionally notices it when she is sitting down playing cards.      IMPRESSION:   1.  Ischemic cardiomyopathy.  Her ejection fraction appears to have significantly improved from the time of her myocardial infarct.   2.  Normotensive.   3.  Excellent lipid profile.   4.  Nuclear stress test shows no objective evidence of ischemia with evidence of prior anteroseptal myocardial infarct.   5.  Normal renal function and normal potassium on spironolactone and furosemide.   6.  Evidence of increased filling pressures in the past on echocardiography and for this reason she is on furosemide.  She has no symptoms of congestive heart failure.      PLAN:  We will continue the patient on her present medications except we will stop the Brilinta medication as she is past the 1 year anniversary of her myocardial infarct and stenting.  I will see the patient back again in 1 year's time.  At that time I will repeat her lipids, her basic metabolic profile and her echocardiogram.        As always, she has been told to contact me if she has any questions or any concerns.  I did reiterate her the importance of taking aspirin.  She may have not been taking aspirin while on the Brilinta.  I certainly did not give any instructions for her to stop the aspirin while on Brilinta.  Most of the data from dual antiplatelet therapy is essentially that dual antiplatelet therapy, not single antiplatelet therapy.      Many thanks for allowing me to be involved in the care of this extremely nice patient.      cc:   Margarita Mcelroy MD    [facility] [address] [place] [address]         MARISOL GUSTAFSON MD, [facility]             D: 08/31/2017 09:45   T: 08/31/2017 10:13   MT: BRITTANEY      Name:     AVERY RICKETTS   MRN:      [id number]        Account:       [id number]   :      1955           Service Date: 2017      Document: [id number]

## 2017-10-16 DIAGNOSIS — I10 HTN (HYPERTENSION): ICD-10-CM

## 2017-10-16 RX ORDER — LOSARTAN POTASSIUM 25 MG/1
25 TABLET ORAL DAILY
Qty: 90 TABLET | Refills: 3 | Status: SHIPPED | OUTPATIENT
Start: 2017-10-16

## 2018-08-29 ENCOUNTER — HOSPITAL ENCOUNTER (OUTPATIENT)
Dept: CARDIOLOGY | Facility: CLINIC | Age: 63
Discharge: HOME | End: 2018-08-29
Attending: INTERNAL MEDICINE | Admitting: INTERNAL MEDICINE
Payer: OTHER GOVERNMENT

## 2018-08-29 DIAGNOSIS — I25.5 ISCHEMIC CARDIOMYOPATHY: ICD-10-CM

## 2018-08-29 PROCEDURE — 93306 TTE W/DOPPLER COMPLETE: CPT | Mod: 26 | Performed by: INTERNAL MEDICINE

## 2018-08-29 PROCEDURE — 93306 TTE W/DOPPLER COMPLETE: CPT

## 2018-08-30 DIAGNOSIS — I25.10 CORONARY ARTERY DISEASE INVOLVING NATIVE CORONARY ARTERY OF NATIVE HEART WITHOUT ANGINA PECTORIS: ICD-10-CM

## 2018-08-30 DIAGNOSIS — I25.5 ISCHEMIC CARDIOMYOPATHY: ICD-10-CM

## 2018-08-30 LAB
ALT SERPL W P-5'-P-CCNC: 30 U/L (ref 0–50)
ANION GAP SERPL CALCULATED.3IONS-SCNC: 7 MMOL/L (ref 3–14)
BUN SERPL-MCNC: 18 MG/DL (ref 7–30)
CALCIUM SERPL-MCNC: 8.7 MG/DL (ref 8.5–10.1)
CHLORIDE SERPL-SCNC: 106 MMOL/L (ref 94–109)
CHOLEST SERPL-MCNC: 149 MG/DL
CO2 SERPL-SCNC: 27 MMOL/L (ref 20–32)
CREAT SERPL-MCNC: 0.76 MG/DL (ref 0.52–1.04)
GFR SERPL CREATININE-BSD FRML MDRD: 77 ML/MIN/1.7M2
GLUCOSE SERPL-MCNC: 133 MG/DL (ref 70–99)
HDLC SERPL-MCNC: 58 MG/DL
LDLC SERPL CALC-MCNC: 73 MG/DL
NONHDLC SERPL-MCNC: 91 MG/DL
POTASSIUM SERPL-SCNC: 4.2 MMOL/L (ref 3.4–5.3)
SODIUM SERPL-SCNC: 140 MMOL/L (ref 133–144)
TRIGL SERPL-MCNC: 89 MG/DL

## 2018-08-30 PROCEDURE — 80048 BASIC METABOLIC PNL TOTAL CA: CPT | Performed by: INTERNAL MEDICINE

## 2018-08-30 PROCEDURE — 36415 COLL VENOUS BLD VENIPUNCTURE: CPT | Performed by: INTERNAL MEDICINE

## 2018-08-30 PROCEDURE — 80061 LIPID PANEL: CPT | Performed by: INTERNAL MEDICINE

## 2018-08-30 PROCEDURE — 84460 ALANINE AMINO (ALT) (SGPT): CPT | Performed by: INTERNAL MEDICINE

## 2018-09-05 ENCOUNTER — OFFICE VISIT (OUTPATIENT)
Dept: CARDIOLOGY | Facility: CLINIC | Age: 63
End: 2018-09-05
Payer: OTHER GOVERNMENT

## 2018-09-05 VITALS
WEIGHT: 187.2 LBS | HEIGHT: 62 IN | DIASTOLIC BLOOD PRESSURE: 62 MMHG | HEART RATE: 68 BPM | SYSTOLIC BLOOD PRESSURE: 118 MMHG | BODY MASS INDEX: 34.45 KG/M2

## 2018-09-05 DIAGNOSIS — I10 BENIGN ESSENTIAL HYPERTENSION: ICD-10-CM

## 2018-09-05 DIAGNOSIS — I21.02 ST ELEVATION MYOCARDIAL INFARCTION INVOLVING LEFT ANTERIOR DESCENDING (LAD) CORONARY ARTERY (H): ICD-10-CM

## 2018-09-05 DIAGNOSIS — I10 ESSENTIAL HYPERTENSION: ICD-10-CM

## 2018-09-05 DIAGNOSIS — I25.10 CORONARY ARTERY DISEASE INVOLVING NATIVE CORONARY ARTERY OF NATIVE HEART WITHOUT ANGINA PECTORIS: ICD-10-CM

## 2018-09-05 DIAGNOSIS — I25.5 ISCHEMIC CARDIOMYOPATHY: Primary | ICD-10-CM

## 2018-09-05 PROCEDURE — 99214 OFFICE O/P EST MOD 30 MIN: CPT | Performed by: INTERNAL MEDICINE

## 2018-09-05 RX ORDER — NITROGLYCERIN 0.4 MG/1
0.4 TABLET SUBLINGUAL EVERY 5 MIN PRN
Qty: 25 TABLET | Refills: 2 | Status: SHIPPED | OUTPATIENT
Start: 2018-09-05 | End: 2023-11-06

## 2018-09-05 NOTE — LETTER
9/5/2018    GLORIA GUAMAN MD  [facility] [address]   [place] [address]    RE: Bernard Dodge       Dear Colleague,    I had the pleasure of seeing Bernard Dodge in the [facility].    HPI and Plan:   See dictation    Orders Placed This Encounter   Procedures     Lipid Profile     ALT     Basic metabolic panel     Lipid Profile     ALT     Follow-Up with Cardiologist     Echocardiogram       Orders Placed This Encounter   Medications     BIOTIN PO     Sig: Take by mouth daily     nitroGLYcerin (NITROSTAT) 0.4 MG sublingual tablet     Sig: Place 1 tablet (0.4 mg) under the tongue every 5 minutes as needed for chest pain if you are still having symptoms after 3 doses (15 minutes) call 911.     Dispense:  25 tablet     Refill:  2       Medications Discontinued During This Encounter   Medication Reason     nitroglycerin (NITROSTAT) 0.4 MG SL tablet Reorder         Encounter Diagnoses   Name Primary?     Ischemic cardiomyopathy Yes     Essential hypertension      ST elevation myocardial infarction involving left anterior descending (LAD) coronary artery (H)      Benign essential hypertension      Coronary artery disease involving native coronary artery of native heart without angina pectoris        CURRENT MEDICATIONS:  Current Outpatient Prescriptions   Medication Sig Dispense Refill     Ascorbic Acid (VITAMIN C PO) Take 1,000 mg by mouth daily       aspirin 81 MG tablet Take 1 tablet (81 mg) by mouth daily 30 tablet      atorvastatin (LIPITOR) 80 MG tablet Take 1 tablet (80 mg) by mouth daily 30 tablet 11     BIOTIN PO Take by mouth daily       carvedilol (COREG) 3.125 MG tablet Take 1 tablet (3.125 mg) by mouth 2 times daily 60 tablet 11     furosemide (LASIX) 40 MG tablet Take 1 tablet (40 mg) by mouth daily 30 tablet 1     losartan (COZAAR) 25 MG tablet Take 1 tablet (25 mg) by mouth daily 90 tablet 3     magnesium 250 MG tablet Take 1 tablet by mouth daily        nitroGLYcerin (NITROSTAT) 0.4 MG sublingual tablet Place 1 tablet (0.4 mg) under the tongue every 5 minutes as needed for chest pain if you are still having symptoms after 3 doses (15 minutes) call 911. 25 tablet 2     Probiotic Product (PROBIOTIC PO) Take 1 capsule by mouth daily       spironolactone (ALDACTONE) 25 MG tablet Take 0.5 tablets (12.5 mg) by mouth daily 30 tablet 1     VITAMIN D, CHOLECALCIFEROL, PO Take 2,000 Units by mouth daily       [DISCONTINUED] nitroglycerin (NITROSTAT) 0.4 MG SL tablet Place 1 tablet (0.4 mg) under the tongue every 5 minutes as needed for chest pain if you are still having symptoms after 3 doses (15 minutes) call 911. 25 tablet 0       ALLERGIES     Allergies   Allergen Reactions     Cellcept [Mycophenolate]      Levaquin [Levofloxacin]      Lisinopril Cough       PAST MEDICAL HISTORY:  Past Medical History:   Diagnosis Date     Benign essential hypertension      BOOP (bronchiolitis obliterans with organizing pneumonia) (H)      Breast CA (H)      CAD (coronary artery disease), native coronary artery     STEMI 8/2016  PCI mid LAD 8/22/16     DM (diabetes mellitus screen)      Dyslipidemia      Ex-smoker      Fatty liver disease, nonalcoholic      Fracture of left ankle      Ischemic cardiomyopathy      Sleep apnea     Cpap       PAST SURGICAL HISTORY:  Past Surgical History:   Procedure Laterality Date     HEART CATH, ANGIOPLASTY       STENT, CORONARY, S660 15/18  08/22/2016    MABEL=LAD       FAMILY HISTORY:  Family History   Problem Relation Age of Onset     Hypertension Mother      Myocardial Infarction Mother      HEART DISEASE Father      Diabetes Brother      HEART DISEASE Brother        SOCIAL HISTORY:  Social History     Social History     Marital status:      Spouse name: N/A     Number of children: N/A     Years of education: N/A     Social History Main Topics     Smoking status: Former Smoker     Packs/day: 1.00     Years: 25.00     Types: Cigarettes     Quit  "date: 2001     Smokeless tobacco: Never Used     Alcohol use 0.0 oz/week     0 Standard drinks or equivalent per week      Comment: a few glasses of wine weekly     Drug use: None     Sexual activity: Not Asked     Other Topics Concern     Parent/Sibling W/ Cabg, Mi Or Angioplasty Before 65f 55m? Yes     Caffeine Concern Yes     1-2  cup daily     Special Diet Yes     low NA     Exercise Yes     heart rehab, walk     Social History Narrative       Review of Systems:  Skin:  Negative       Eyes:  Positive for glasses reading  ENT:  Negative      Respiratory:  Negative       Cardiovascular:  Negative      Gastroenterology: Positive for constipation    Genitourinary:  Negative      Musculoskeletal:  Positive for arthritis    Neurologic:  Positive for numbness or tingling of feet    Psychiatric:  Negative      Heme/Lymph/Imm:  Negative      Endocrine:  Positive for diabetes      Physical Exam:  Vitals: /62  Pulse 68  Ht 1.575 m (5' 2\")  Wt 84.9 kg (187 lb 3.2 oz)  BMI 34.24 kg/m2    Constitutional:  cooperative, alert and oriented, well developed, well nourished, in no acute distress        Skin:  warm and dry to the touch, no apparent skin lesions or masses noted          Head:  normocephalic, no masses or lesions        Eyes:  pupils equal and round, conjunctivae and lids unremarkable, sclera white, no xanthalasma, EOMS intact, no nystagmus        Lymph:      ENT:  no pallor or cyanosis, dentition good        Neck:  carotid pulses are full and equal bilaterally, JVP normal, no carotid bruit        Respiratory:  normal breath sounds, clear to auscultation, normal A-P diameter, normal symmetry, normal respiratory excursion, no use of accessory muscles         Cardiac: normal S1 and S2;regular rhythm;apical impulse not displaced   S4 no presence of murmur          pulses full and equal, no bruits auscultated                                   left radial site intact without hum or bruit    GI:  abdomen soft, " non-tender, BS normoactive, no mass, no HSM, no bruits        Extremities and Muscular Skeletal:  no deformities, clubbing, cyanosis, erythema observed              Neurological:  no gross motor deficits;affect appropriate        Psych:  Alert and Oriented x 3        CC  Tim Heredia MD  [address] [facility] [place] [address]                Thank you for allowing me to participate in the care of your patient.      Sincerely,     Tim Heredia MD, MD     [facility]    cc:   Tmi Heredia MD  [address] [facility]  [place] [address]

## 2018-09-05 NOTE — LETTER
9/5/2018      GLORIA GUAMAN MD  [facility] [address]   [place] [address]      RE: Bernard Dodge       Dear Colleague,    I had the pleasure of seeing Bernard Dodge in the [facility].    Service Date: 09/05/2018      REFERRING PHYSICIAN:  Dr. Gloria Guaman.      HISTORY OF PRESENT ILLNESS:  It was my pleasure to see your patient, Bernard Dodge, who is a pleasant 62-year-old patient with a history of an ischemic cardiomyopathy and past history of an anterior ST elevation myocardial infarct.  On 08/22/2016 she had a drug-eluting stent placed in the mid left anterior descending artery.  The patient has proximal 50% circumflex disease and 60% disease in the distal circumflex.  The right coronary artery has proximal 20%-30% disease.  Echocardiography was performed approximately a week ago, and this showed that her ejection fraction is in the 40%-45% range.  Regional wall motion abnormalities are consistent with a prior LAD territory infarct.  No significant valvular heart disease is present.  Her lipid profile is not as good as it was previously, and the main reason for this is that she broke her ankle during the wintertime and cannot exercise near as much as she did in the past.  Her LDL cholesterol has risen from 49 to 73, her HDL has dropped from 75 to 58, and her triglycerides have risen from 59 to 89.  Despite this, however, this is a reasonably good lipid profile.  She has no symptoms of congestive heart failure but has a lung condition from radiation for breast cancer.  She is due to see her pulmonologist in the near future.  Blood pressure was normal today at 118/62.      IMPRESSION:   1.  Ischemic cardiomyopathy.  The ejection fraction is similar to what it was previously with evidence of a prior anterior myocardial infarct.   2.  Asymptomatic with respect to coronary artery disease with no symptoms of angina pectoris.   3.  Normotensive.   4.  Lipids are not as good as  previously due to lack of exercise and weight gain, with her weight going from 172 pounds in 2017 to 187 pounds today.      PLAN:   1.  The patient will try to lose weight and adhere to a heart-healthy, low-cholesterol diet.   2.  We will recheck her lipids in 3 months' time with increased exercise.  If her lipids still remain the same or get worse, I would switch her to rosuvastatin 40 mg per day.  I will see her again in 1 year's time and will repeat her echocardiogram to follow her ischemic cardiomyopathy.  As always, the patient has been told to contact us if she has any questions or any concerns.      It has been my pleasure to be involved in the care of this very nice patient.      Tim Heredia MD, FACC       cc:   Margarita Mcelroy MD    [facility] [address] [place] [address]         TIM GUSTAFSON MD, FACC             D: 2018   T: 2018   MT: KAVON      Name:     AVERY RICKETTS   MRN:      [id number]        Account:      [id number]   :      1955           Service Date: 2018      Document: [id number]         Outpatient Encounter Prescriptions as of 2018   Medication Sig Dispense Refill     Ascorbic Acid (VITAMIN C PO) Take 1,000 mg by mouth daily       aspirin 81 MG tablet Take 1 tablet (81 mg) by mouth daily 30 tablet      atorvastatin (LIPITOR) 80 MG tablet Take 1 tablet (80 mg) by mouth daily 30 tablet 11     BIOTIN PO Take by mouth daily       carvedilol (COREG) 3.125 MG tablet Take 1 tablet (3.125 mg) by mouth 2 times daily 60 tablet 11     furosemide (LASIX) 40 MG tablet Take 1 tablet (40 mg) by mouth daily 30 tablet 1     losartan (COZAAR) 25 MG tablet Take 1 tablet (25 mg) by mouth daily 90 tablet 3     magnesium 250 MG tablet Take 1 tablet by mouth daily       nitroGLYcerin (NITROSTAT) 0.4 MG sublingual tablet Place 1 tablet (0.4 mg) under the tongue every 5 minutes as needed for chest pain if you are still having symptoms after 3  doses (15 minutes) call 911. 25 tablet 2     Probiotic Product (PROBIOTIC PO) Take 1 capsule by mouth daily       spironolactone (ALDACTONE) 25 MG tablet Take 0.5 tablets (12.5 mg) by mouth daily 30 tablet 1     VITAMIN D, CHOLECALCIFEROL, PO Take 2,000 Units by mouth daily       [DISCONTINUED] nitroglycerin (NITROSTAT) 0.4 MG SL tablet Place 1 tablet (0.4 mg) under the tongue every 5 minutes as needed for chest pain if you are still having symptoms after 3 doses (15 minutes) call 911. 25 tablet 0     No facility-administered encounter medications on file as of 9/5/2018.        Again, thank you for allowing me to participate in the care of your patient.      Sincerely,    Tim Heredia MD, MD     [facility]

## 2018-09-05 NOTE — MR AVS SNAPSHOT
After Visit Summary   9/5/2018    Bernard Dodge    MRN: [id number]           Patient Information     Date Of Birth          1955        Visit Information        Provider Department      9/5/2018 2:15 PM Tim Heredia MD [facility]        Today's Diagnoses     Ischemic cardiomyopathy    -  1    Essential hypertension        ST elevation myocardial infarction involving left anterior descending (LAD) coronary artery (H)        Benign essential hypertension        Coronary artery disease involving native coronary artery of native heart without angina pectoris           Follow-ups after your visit        Additional Services     Follow-Up with Cardiologist                 Future tests that were ordered for you today     Open Future Orders        Priority Expected Expires Ordered    Lipid Profile Routine 12/4/2018 9/5/2019 9/5/2018    ALT Routine 12/4/2018 9/5/2019 9/5/2018    Lipid Profile Routine 9/5/2019 9/5/2019 9/5/2018    ALT Routine 9/5/2019 9/5/2019 9/5/2018    Basic metabolic panel Routine 9/5/2019 9/6/2019 9/5/2018    Echocardiogram Routine 9/5/2019 9/6/2019 9/5/2018            Who to contact     If you have questions or need follow up information about today's clinic visit or your schedule please contact [facility] directly at [phone].  Normal or non-critical lab and imaging results will be communicated to you by MyChart, letter or phone within 4 business days after the clinic has received the results. If you do not hear from us within 7 days, please contact the clinic through MyChart or phone. If you have a critical or abnormal lab result, we will notify you by phone as soon as possible.  Submit refill requests through [employer] or call your pharmacy and they will forward the refill request to us. Please allow 3 business days for your refill to be completed.          Additional  "Information About Your Visit        MyChart Information     [employer] gives you secure access to your electronic health record. If you see a primary care provider, you can also send messages to your care team and make appointments. If you have questions, please call your primary care clinic.  If you do not have a primary care provider, please call [phone] and they will assist you.        Care EveryWhere ID     This is your Care EveryWhere ID. This could be used by other organizations to access your [facility] medical records  [id number]        Your Vitals Were     Pulse Height BMI (Body Mass Index)             68 1.575 m (5' 2\") 34.24 kg/m2          Blood Pressure from Last 3 Encounters:   09/05/18 118/62   08/31/17 108/64   04/04/17 112/60    Weight from Last 3 Encounters:   09/05/18 84.9 kg (187 lb 3.2 oz)   08/31/17 79.8 kg (175 lb 14.4 oz)   04/04/17 78.2 kg (172 lb 8 oz)                 Where to get your medicines      Some of these will need a paper prescription and others can be bought over the counter.  Ask your nurse if you have questions.     Bring a paper prescription for each of these medications     nitroGLYcerin 0.4 MG sublingual tablet          Primary Care Provider Office Phone # Fax #    Margarita Mcelroy [phone]       [facility] [address] [place] [address]        Equal Access to Services     [facility]: Hadii shannon sim hadasho Soskyler, waaxda luqadaha, qaybta kaalmada ademarichuyyada, justice guido . So [facility] [phone].    ATENCIÓN: Si habla español, tiene a best disposición servicios gratuitos de asistencia lingüística. Llame al [phone].    We comply with applicable federal civil rights laws and [place] laws. We do not discriminate on the basis of race, color, national origin, age, disability, sex, sexual orientation, or gender identity.            Thank you!     Thank you for choosing [facility]   " [place]  for your care. Our goal is always to provide you with excellent care. Hearing back from our patients is one way we can continue to improve our services. Please take a few minutes to complete the written survey that you may receive in the mail after your visit with us. Thank you!             Your Updated Medication List - Protect others around you: Learn how to safely use, store and throw away your medicines at www.disposemymeds.org.          This list is accurate as of 9/5/18  2:57 PM.  Always use your most recent med list.                   Brand Name Dispense Instructions for use Diagnosis    aspirin 81 MG tablet     30 tablet    Take 1 tablet (81 mg) by mouth daily    ST elevation myocardial infarction involving left anterior descending (LAD) coronary artery (H)       atorvastatin 80 MG tablet    LIPITOR    30 tablet    Take 1 tablet (80 mg) by mouth daily    ST elevation myocardial infarction involving left anterior descending (LAD) coronary artery (H)       BIOTIN PO      Take by mouth daily        carvedilol 3.125 MG tablet    COREG    60 tablet    Take 1 tablet (3.125 mg) by mouth 2 times daily    ST elevation myocardial infarction involving left anterior descending (LAD) coronary artery (H)       furosemide 40 MG tablet    LASIX    30 tablet    Take 1 tablet (40 mg) by mouth daily    ST elevation myocardial infarction involving left anterior descending (LAD) coronary artery (H)       losartan 25 MG tablet    COZAAR    90 tablet    Take 1 tablet (25 mg) by mouth daily    HTN (hypertension)       magnesium 250 MG tablet      Take 1 tablet by mouth daily        nitroGLYcerin 0.4 MG sublingual tablet    NITROSTAT    25 tablet    Place 1 tablet (0.4 mg) under the tongue every 5 minutes as needed for chest pain if you are still having symptoms after 3 doses (15 minutes) call 911.    ST elevation myocardial infarction involving left anterior descending (LAD) coronary artery (H)       PROBIOTIC PO       Take 1 capsule by mouth daily        spironolactone 25 MG tablet    ALDACTONE    30 tablet    Take 0.5 tablets (12.5 mg) by mouth daily    ST elevation myocardial infarction involving left anterior descending (LAD) coronary artery (H)       VITAMIN C PO      Take 1,000 mg by mouth daily        VITAMIN D (CHOLECALCIFEROL) PO      Take 2,000 Units by mouth daily

## 2018-09-05 NOTE — PROGRESS NOTES
HPI and Plan:   See dictation    Orders Placed This Encounter   Procedures     Lipid Profile     ALT     Basic metabolic panel     Lipid Profile     ALT     Follow-Up with Cardiologist     Echocardiogram       Orders Placed This Encounter   Medications     BIOTIN PO     Sig: Take by mouth daily     nitroGLYcerin (NITROSTAT) 0.4 MG sublingual tablet     Sig: Place 1 tablet (0.4 mg) under the tongue every 5 minutes as needed for chest pain if you are still having symptoms after 3 doses (15 minutes) call 911.     Dispense:  25 tablet     Refill:  2       Medications Discontinued During This Encounter   Medication Reason     nitroglycerin (NITROSTAT) 0.4 MG SL tablet Reorder         Encounter Diagnoses   Name Primary?     Ischemic cardiomyopathy Yes     Essential hypertension      ST elevation myocardial infarction involving left anterior descending (LAD) coronary artery (H)      Benign essential hypertension      Coronary artery disease involving native coronary artery of native heart without angina pectoris        CURRENT MEDICATIONS:  Current Outpatient Prescriptions   Medication Sig Dispense Refill     Ascorbic Acid (VITAMIN C PO) Take 1,000 mg by mouth daily       aspirin 81 MG tablet Take 1 tablet (81 mg) by mouth daily 30 tablet      atorvastatin (LIPITOR) 80 MG tablet Take 1 tablet (80 mg) by mouth daily 30 tablet 11     BIOTIN PO Take by mouth daily       carvedilol (COREG) 3.125 MG tablet Take 1 tablet (3.125 mg) by mouth 2 times daily 60 tablet 11     furosemide (LASIX) 40 MG tablet Take 1 tablet (40 mg) by mouth daily 30 tablet 1     losartan (COZAAR) 25 MG tablet Take 1 tablet (25 mg) by mouth daily 90 tablet 3     magnesium 250 MG tablet Take 1 tablet by mouth daily       nitroGLYcerin (NITROSTAT) 0.4 MG sublingual tablet Place 1 tablet (0.4 mg) under the tongue every 5 minutes as needed for chest pain if you are still having symptoms after 3 doses (15 minutes) call 911. 25 tablet 2     Probiotic Product  (PROBIOTIC PO) Take 1 capsule by mouth daily       spironolactone (ALDACTONE) 25 MG tablet Take 0.5 tablets (12.5 mg) by mouth daily 30 tablet 1     VITAMIN D, CHOLECALCIFEROL, PO Take 2,000 Units by mouth daily       [DISCONTINUED] nitroglycerin (NITROSTAT) 0.4 MG SL tablet Place 1 tablet (0.4 mg) under the tongue every 5 minutes as needed for chest pain if you are still having symptoms after 3 doses (15 minutes) call 911. 25 tablet 0       ALLERGIES     Allergies   Allergen Reactions     Cellcept [Mycophenolate]      Levaquin [Levofloxacin]      Lisinopril Cough       PAST MEDICAL HISTORY:  Past Medical History:   Diagnosis Date     Benign essential hypertension      BOOP (bronchiolitis obliterans with organizing pneumonia) (H)      Breast CA (H)      CAD (coronary artery disease), native coronary artery     STEMI 8/2016  PCI mid LAD 8/22/16     DM (diabetes mellitus screen)      Dyslipidemia      Ex-smoker      Fatty liver disease, nonalcoholic      Fracture of left ankle      Ischemic cardiomyopathy      Sleep apnea     Cpap       PAST SURGICAL HISTORY:  Past Surgical History:   Procedure Laterality Date     HEART CATH, ANGIOPLASTY       STENT, CORONARY, S660 15/18  08/22/2016    MABEL=LAD       FAMILY HISTORY:  Family History   Problem Relation Age of Onset     Hypertension Mother      Myocardial Infarction Mother      HEART DISEASE Father      Diabetes Brother      HEART DISEASE Brother        SOCIAL HISTORY:  Social History     Social History     Marital status:      Spouse name: N/A     Number of children: N/A     Years of education: N/A     Social History Main Topics     Smoking status: Former Smoker     Packs/day: 1.00     Years: 25.00     Types: Cigarettes     Quit date: 2001     Smokeless tobacco: Never Used     Alcohol use 0.0 oz/week     0 Standard drinks or equivalent per week      Comment: a few glasses of wine weekly     Drug use: None     Sexual activity: Not Asked     Other Topics Concern      "Parent/Sibling W/ Cabg, Mi Or Angioplasty Before 65f 55m? Yes     Caffeine Concern Yes     1-2  cup daily     Special Diet Yes     low NA     Exercise Yes     heart rehab, walk     Social History Narrative       Review of Systems:  Skin:  Negative       Eyes:  Positive for glasses reading  ENT:  Negative      Respiratory:  Negative       Cardiovascular:  Negative      Gastroenterology: Positive for constipation    Genitourinary:  Negative      Musculoskeletal:  Positive for arthritis    Neurologic:  Positive for numbness or tingling of feet    Psychiatric:  Negative      Heme/Lymph/Imm:  Negative      Endocrine:  Positive for diabetes      Physical Exam:  Vitals: /62  Pulse 68  Ht 1.575 m (5' 2\")  Wt 84.9 kg (187 lb 3.2 oz)  BMI 34.24 kg/m2    Constitutional:  cooperative, alert and oriented, well developed, well nourished, in no acute distress        Skin:  warm and dry to the touch, no apparent skin lesions or masses noted          Head:  normocephalic, no masses or lesions        Eyes:  pupils equal and round, conjunctivae and lids unremarkable, sclera white, no xanthalasma, EOMS intact, no nystagmus        Lymph:      ENT:  no pallor or cyanosis, dentition good        Neck:  carotid pulses are full and equal bilaterally, JVP normal, no carotid bruit        Respiratory:  normal breath sounds, clear to auscultation, normal A-P diameter, normal symmetry, normal respiratory excursion, no use of accessory muscles         Cardiac: normal S1 and S2;regular rhythm;apical impulse not displaced   S4 no presence of murmur          pulses full and equal, no bruits auscultated                                   left radial site intact without hum or bruit    GI:  abdomen soft, non-tender, BS normoactive, no mass, no HSM, no bruits        Extremities and Muscular Skeletal:  no deformities, clubbing, cyanosis, erythema observed              Neurological:  no gross motor deficits;affect appropriate        Psych:  Alert " and Oriented x 3        CC  Tim Heredia MD  [address]  [place] [address]

## 2019-02-04 ENCOUNTER — CARE COORDINATION (OUTPATIENT)
Dept: CARDIOLOGY | Facility: CLINIC | Age: 64
End: 2019-02-04

## 2019-02-04 DIAGNOSIS — I25.10 CORONARY ARTERY DISEASE INVOLVING NATIVE CORONARY ARTERY OF NATIVE HEART WITHOUT ANGINA PECTORIS: ICD-10-CM

## 2019-02-04 LAB
ALT SERPL W P-5'-P-CCNC: 29 U/L (ref 0–50)
CHOLEST SERPL-MCNC: 152 MG/DL
HDLC SERPL-MCNC: 60 MG/DL
LDLC SERPL CALC-MCNC: 66 MG/DL
NONHDLC SERPL-MCNC: 92 MG/DL
TRIGL SERPL-MCNC: 131 MG/DL

## 2019-02-04 PROCEDURE — 80061 LIPID PANEL: CPT | Performed by: INTERNAL MEDICINE

## 2019-02-04 PROCEDURE — 84460 ALANINE AMINO (ALT) (SGPT): CPT | Performed by: INTERNAL MEDICINE

## 2019-02-04 PROCEDURE — 36415 COLL VENOUS BLD VENIPUNCTURE: CPT | Performed by: INTERNAL MEDICINE

## 2019-02-04 NOTE — PROGRESS NOTES
"Reviewed lipids/alt showing   Recent Labs   Lab Test 02/04/19  0758 08/30/18  0804   CHOL 152 149   HDL 60 58   LDL 66 73   TRIG 131 89     Lab Results   Component Value Date    ALT 29 02/04/2019        Current meds are   Current Outpatient Medications   Medication     Ascorbic Acid (VITAMIN C PO)     aspirin 81 MG tablet     atorvastatin (LIPITOR) 80 MG tablet     BIOTIN PO     carvedilol (COREG) 3.125 MG tablet     furosemide (LASIX) 40 MG tablet     losartan (COZAAR) 25 MG tablet     magnesium 250 MG tablet     nitroGLYcerin (NITROSTAT) 0.4 MG sublingual tablet     Probiotic Product (PROBIOTIC PO)     spironolactone (ALDACTONE) 25 MG tablet     VITAMIN D, CHOLECALCIFEROL, PO     No current facility-administered medications for this visit.       Per office note dated 9/5/18, Dr. Heredia recommended, \"Her LDL cholesterol has risen from 49 to 73, her HDL has dropped from 75 to 58, and her triglycerides have risen from 59 to 89.  Despite this, however, this is a reasonably good lipid profile... Lipids are not as good as previously due to lack of exercise and weight gain, with her weight going from 172 pounds in 04/2017 to 187 pounds today... The patient will try to lose weight and adhere to a heart-healthy, low-cholesterol diet. We will recheck her lipids in 3 months' time with increased exercise.  If her lipids still remain the same or get worse, I would switch her to rosuvastatin 40 mg per day.\"     Attempted to call pt with results, left message for pt to call back. Shai GONGORA   "

## 2019-02-04 NOTE — LETTER
"February 5, 2019     TO: Bernard Dodge   [address] [facility] [address]     Dear Ms. Dodge,  The results of your recent Laboratory tests.    Results for orders placed or performed in visit on 02/04/19   ALT   Result Value Ref Range    ALT 29 0 - 50 U/L   Lipid Profile   Result Value Ref Range    Cholesterol 152 <200 mg/dL    Triglycerides 131 <150 mg/dL    HDL Cholesterol 60 >49 mg/dL    LDL Cholesterol Calculated 66 <100 mg/dL    Non HDL Cholesterol 92 <130 mg/dL     Your test results fall within the expected range(s) or remain unchanged from previous results.  Please continue with current treatment plan. Dr. Heredia recommended, \"Continue with present meds. Thx.\" Please call [phone] with questions or concerns.     Sincerely,  [facility] [place]        "

## 2019-02-04 NOTE — PROGRESS NOTES
Pt called back, reviewed lipid/alt results with her. She is taking atorvastatin 80 mg daily with c/o weakness in er arms. She has noticed over the last several months she has been having more difficulty holding her curling iron to curl her hair. She also c/o muscle pain in her thighs when walking up long flights of stairs but can walk 1.5 miles on the treadmill without difficulty. She denies dark urine. She states she tries to follow a low fat diet & exercises 3 times per week.     Will message Dr. Heredia to review. Shai GONGORA

## 2019-02-05 NOTE — PROGRESS NOTES
Results & recommendations mailed to pt with request to continue same meds and call with questions or concerns. Shai GONGORA

## 2019-09-12 DIAGNOSIS — I10 ESSENTIAL HYPERTENSION: ICD-10-CM

## 2019-09-12 DIAGNOSIS — I25.10 CORONARY ARTERY DISEASE INVOLVING NATIVE CORONARY ARTERY OF NATIVE HEART WITHOUT ANGINA PECTORIS: ICD-10-CM

## 2019-09-12 LAB
ALT SERPL W P-5'-P-CCNC: 36 U/L (ref 0–50)
ANION GAP SERPL CALCULATED.3IONS-SCNC: 5 MMOL/L (ref 3–14)
BUN SERPL-MCNC: 22 MG/DL (ref 7–30)
CALCIUM SERPL-MCNC: 9.5 MG/DL (ref 8.5–10.1)
CHLORIDE SERPL-SCNC: 106 MMOL/L (ref 94–109)
CHOLEST SERPL-MCNC: 142 MG/DL
CO2 SERPL-SCNC: 29 MMOL/L (ref 20–32)
CREAT SERPL-MCNC: 0.78 MG/DL (ref 0.52–1.04)
GFR SERPL CREATININE-BSD FRML MDRD: 81 ML/MIN/{1.73_M2}
GLUCOSE SERPL-MCNC: 140 MG/DL (ref 70–99)
HDLC SERPL-MCNC: 59 MG/DL
LDLC SERPL CALC-MCNC: 64 MG/DL
NONHDLC SERPL-MCNC: 83 MG/DL
POTASSIUM SERPL-SCNC: 4.4 MMOL/L (ref 3.4–5.3)
SODIUM SERPL-SCNC: 140 MMOL/L (ref 133–144)
TRIGL SERPL-MCNC: 93 MG/DL

## 2019-09-12 PROCEDURE — 80061 LIPID PANEL: CPT | Performed by: INTERNAL MEDICINE

## 2019-09-12 PROCEDURE — 36415 COLL VENOUS BLD VENIPUNCTURE: CPT | Performed by: INTERNAL MEDICINE

## 2019-09-12 PROCEDURE — 80048 BASIC METABOLIC PNL TOTAL CA: CPT | Performed by: INTERNAL MEDICINE

## 2019-09-12 PROCEDURE — 84460 ALANINE AMINO (ALT) (SGPT): CPT | Performed by: INTERNAL MEDICINE

## 2019-09-20 ENCOUNTER — HOSPITAL ENCOUNTER (OUTPATIENT)
Dept: CARDIOLOGY | Facility: CLINIC | Age: 64
Discharge: HOME | End: 2019-09-20
Attending: INTERNAL MEDICINE | Admitting: INTERNAL MEDICINE
Payer: OTHER GOVERNMENT

## 2019-09-20 DIAGNOSIS — I25.5 ISCHEMIC CARDIOMYOPATHY: ICD-10-CM

## 2019-09-20 PROCEDURE — 93306 TTE W/DOPPLER COMPLETE: CPT

## 2019-09-20 PROCEDURE — 93306 TTE W/DOPPLER COMPLETE: CPT | Mod: 26 | Performed by: INTERNAL MEDICINE

## 2019-10-02 ENCOUNTER — HEALTH MAINTENANCE LETTER (OUTPATIENT)
Age: 64
End: 2019-10-02

## 2019-11-08 ENCOUNTER — OFFICE VISIT (OUTPATIENT)
Dept: CARDIOLOGY | Facility: CLINIC | Age: 64
End: 2019-11-08
Payer: OTHER GOVERNMENT

## 2019-11-08 VITALS
HEART RATE: 72 BPM | BODY MASS INDEX: 35.37 KG/M2 | WEIGHT: 192.2 LBS | SYSTOLIC BLOOD PRESSURE: 126 MMHG | DIASTOLIC BLOOD PRESSURE: 64 MMHG | HEIGHT: 62 IN

## 2019-11-08 DIAGNOSIS — I10 ESSENTIAL HYPERTENSION: ICD-10-CM

## 2019-11-08 DIAGNOSIS — I25.10 CORONARY ARTERY DISEASE INVOLVING NATIVE CORONARY ARTERY OF NATIVE HEART WITHOUT ANGINA PECTORIS: Primary | ICD-10-CM

## 2019-11-08 DIAGNOSIS — I25.5 ISCHEMIC CARDIOMYOPATHY: ICD-10-CM

## 2019-11-08 PROCEDURE — 99214 OFFICE O/P EST MOD 30 MIN: CPT | Performed by: NURSE PRACTITIONER

## 2019-11-08 ASSESSMENT — MIFFLIN-ST. JEOR: SCORE: 1380.06

## 2019-11-08 NOTE — PROGRESS NOTES
History of Present Illness:    Bernard Dodge is a 64 year old female followed here by Dr. Adair Morales.  She returns today for her annual visit.  Jada has a history of ischemic cardiomyopathy with an anterior ST elevation myocardial infarction.  In August 2016 she underwent a drug-eluting stent to the LAD with ongoing proximal 50% circumflex disease 60% distal circumflex disease and 20 to 30% right coronary disease.    Historically her ejection fraction has been 40 to 45% with an LAD wall motion abnormality.  There is been no significant valvular disease present.  LV size is normal.    Her recent echo was read at 60 to 65%.  I have had Dr. Adair Morales review this and he agrees the LV function is normal likely more at 55 to 60%.  Her LV size continues to be normal.    Her anginal equivalent is intrascapular discomfort which is quite severe.  This has not reoccurred.  Nuclear stress test done in 2017 showed her LAD infarct with mild lizbeth-infarction ischemia.  All the other distributions had good flow.    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  With this change in her pattern she notes some mild dyspnea on exertion with stairs otherwise she is asymptomatic.  Her A1c is 6.4%.  She follows with her primary care physician closely about this.  Her weight is actually up 20 pounds in the past 2 years which he talked about today.  Certainly would help her diabetes and overall conditioning if she attempts weight reduction which she is seriously considering another business has slowed down a bit.    Labs today show sodium of 140 potassium 4.4 BUN 22 creatinine 0.70.  She remains on furosemide 40 mg a day with spironolactone 12.5 mg daily.  Lipids are controlled with a total cholesterol 142 HDL 59 LDL 64 triglyceride 93 on atorvastatin 80 mg/day.    Exam today as outlined below and is unremarkable with clear lungs no murmur no edema.    She notes occasional swelling in her  hands that she has high sodium foods such as Chinese food the day before.  She wonders if she could take an extra furosemide    Told her rather to limit salt and try extra water sparingly use an extra 20 mg but I would prefer she limit salt in her diet first.        Impression/Plan:     1.  Coronary artery disease with history of LAD infarct in 2016 with drug-eluting stent.  Ongoing 50% proximal circumflex stenosis 60% distal circumflex stenosis and minimal right coronary disease.  She is having no angina.  LV function is normalized.  I will continue her current medical therapy and do an echocardiogram next year.  She will see Dr. Heredia back at that point.  Given no angina clinically I would not pursue a stress test at this time    2.  Dyslipidemia   -controlled however she is having gradual weight gain and given her diabetes we did discuss this today.  I would suggest to resume her exercise program as well    3.  A1c 6.4%  - being followed closely by her primary care physician    4.  Blood pressure is well controlled    5.  History of left breast cancer with lumpectomy and radiation.  The radiation oncologist BOOP  There is been no recurrence of this.    It has been a pleasure seeing Bernard Dodge in follow up today.  Greater than 50% of this 30 minute visit was spent in counseling/collaboration  She will see Dr. Heredia back in one year.    Leonard Stack, MSN, APRN-BC, CNP  Cardiology    Orders Placed This Encounter   Procedures     Basic metabolic panel     Lipid Profile     ALT     Follow-Up with Cardiologist     Echocardiogram Complete     No orders of the defined types were placed in this encounter.    There are no discontinued medications.      Encounter Diagnosis   Name Primary?     Coronary artery disease involving native coronary artery of native heart without angina pectoris Yes       CURRENT MEDICATIONS:  Current Outpatient Medications   Medication Sig Dispense Refill     Ascorbic  Acid (VITAMIN C PO) Take 1,000 mg by mouth daily       aspirin 81 MG tablet Take 1 tablet (81 mg) by mouth daily 30 tablet      atorvastatin (LIPITOR) 80 MG tablet Take 1 tablet (80 mg) by mouth daily 30 tablet 11     BIOTIN PO Take by mouth daily       carvedilol (COREG) 3.125 MG tablet Take 1 tablet (3.125 mg) by mouth 2 times daily 60 tablet 11     furosemide (LASIX) 40 MG tablet Take 1 tablet (40 mg) by mouth daily 30 tablet 1     losartan (COZAAR) 25 MG tablet Take 1 tablet (25 mg) by mouth daily 90 tablet 3     magnesium 250 MG tablet Take 1 tablet by mouth daily       nitroGLYcerin (NITROSTAT) 0.4 MG sublingual tablet Place 1 tablet (0.4 mg) under the tongue every 5 minutes as needed for chest pain if you are still having symptoms after 3 doses (15 minutes) call 911. 25 tablet 2     Probiotic Product (PROBIOTIC PO) Take 1 capsule by mouth daily       spironolactone (ALDACTONE) 25 MG tablet Take 0.5 tablets (12.5 mg) by mouth daily 30 tablet 1     VITAMIN D, CHOLECALCIFEROL, PO Take 2,000 Units by mouth daily         ALLERGIES     Allergies   Allergen Reactions     Cellcept [Mycophenolate]      Levaquin [Levofloxacin]      Lisinopril Cough       PAST MEDICAL HISTORY:  Past Medical History:   Diagnosis Date     Benign essential hypertension      BOOP (bronchiolitis obliterans with organizing pneumonia) (H)     post radiation     Breast CA (H)     left side     CAD (coronary artery disease), native coronary artery     STEMI 8/2016  PCI mid LAD 8/22/16     DM (diabetes mellitus screen)      Dyslipidemia      Ex-smoker      Fatty liver disease, nonalcoholic      Fracture of left ankle      Ischemic cardiomyopathy      Sleep apnea     Cpap       PAST SURGICAL HISTORY:  Past Surgical History:   Procedure Laterality Date     HEART CATH, ANGIOPLASTY       STENT, CORONARY, S660 15/18  08/22/2016    MABEL=LAD       FAMILY HISTORY:  Family History   Problem Relation Age of Onset     Hypertension Mother      Myocardial  Infarction Mother      Heart Disease Father      Diabetes Brother      Heart Disease Brother        SOCIAL HISTORY:  Social History     Socioeconomic History     Marital status:      Spouse name: None     Number of children: None     Years of education: None     Highest education level: None   Occupational History     None   Social Needs     Financial resource strain: None     Food insecurity:     Worry: None     Inability: None     Transportation needs:     Medical: None     Non-medical: None   Tobacco Use     Smoking status: Former Smoker     Packs/day: 1.00     Years: 25.00     Pack years: 25.00     Types: Cigarettes     Last attempt to quit:      Years since quittin.8     Smokeless tobacco: Never Used   Substance and Sexual Activity     Alcohol use: Yes     Alcohol/week: 0.0 standard drinks     Comment: a few glasses of wine weekly     Drug use: None     Sexual activity: None   Lifestyle     Physical activity:     Days per week: None     Minutes per session: None     Stress: None   Relationships     Social connections:     Talks on phone: None     Gets together: None     Attends Tenri[employer] service: None     Active member of club or organization: None     Attends meetings of clubs or organizations: None     Relationship status: None     Intimate partner violence:     Fear of current or ex partner: None     Emotionally abused: None     Physically abused: None     Forced sexual activity: None   Other Topics Concern     Parent/sibling w/ CABG, MI or angioplasty before 65F 55M? Yes      Service Not Asked     Blood Transfusions Not Asked     Caffeine Concern Yes     Comment: 1-2  cup daily     Occupational Exposure Not Asked     Hobby Hazards Not Asked     Sleep Concern Not Asked     Stress Concern Not Asked     Weight Concern Not Asked     Special Diet Yes     Comment: low NA     Back Care Not Asked     Exercise Yes     Comment: heart rehab, walk     Bike Helmet Not Asked     Seat Belt Not  "Asked     Self-Exams Not Asked   Social History Narrative     None       Review of Systems:  Skin:  Negative       Eyes:  Positive for glasses reading  ENT:  Negative      Respiratory:  Positive for sleep apnea;CPAP     Cardiovascular:  Negative      Gastroenterology: Negative      Genitourinary:  Negative      Musculoskeletal:  Positive for arthritis pt states \"normal for age\" pain  Neurologic:  Negative      Psychiatric:  Negative      Heme/Lymph/Imm:  Negative      Endocrine:  Positive for diabetes      Physical Exam:  Vitals: /64 (BP Location: Right arm, Patient Position: Sitting, Cuff Size: Adult Regular)   Pulse 72   Ht 1.575 m (5' 2\")   Wt 87.2 kg (192 lb 3.2 oz)   LMP  (LMP Unknown)   Breastfeeding? No   BMI 35.15 kg/m      Constitutional:  cooperative, alert and oriented, well developed, well nourished, in no acute distress        Skin:  warm and dry to the touch, no apparent skin lesions or masses noted          Head:  normocephalic, no masses or lesions        Eyes:  pupils equal and round, conjunctivae and lids unremarkable, sclera white, no xanthalasma, EOMS intact, no nystagmus        Lymph:      ENT:  no pallor or cyanosis, dentition good        Neck:  carotid pulses are full and equal bilaterally, JVP normal, no carotid bruit        Respiratory:  normal breath sounds, clear to auscultation, normal A-P diameter, normal symmetry, normal respiratory excursion, no use of accessory muscles         Cardiac: normal S1 and S2;regular rhythm;apical impulse not displaced   S4 no presence of murmur          pulses full and equal, no bruits auscultated                                   left radial site intact without hum or bruit    GI:  abdomen soft, non-tender, BS normoactive, no mass, no HSM, no bruits        Extremities and Muscular Skeletal:  no deformities, clubbing, cyanosis, erythema observed              Neurological:  no gross motor deficits;affect appropriate        Psych:  Alert and " Oriented x 3      Recent Lab Results:  LIPID RESULTS:  Lab Results   Component Value Date    CHOL 142 09/12/2019    HDL 59 09/12/2019    LDL 64 09/12/2019    TRIG 93 09/12/2019       LIVER ENZYME RESULTS:  Lab Results   Component Value Date    AST 47 (H) 08/22/2016    ALT 36 09/12/2019       CBC RESULTS:  Lab Results   Component Value Date    WBC 8.7 08/25/2016    RBC 4.05 08/25/2016    HGB 11.9 08/25/2016    HCT 35.4 08/25/2016    MCV 87 08/25/2016    MCH 29.4 08/25/2016    MCHC 33.6 08/25/2016    RDW 14.1 08/25/2016     08/25/2016       BMP RESULTS:  Lab Results   Component Value Date     09/12/2019    POTASSIUM 4.4 09/12/2019    CHLORIDE 106 09/12/2019    CO2 29 09/12/2019    ANIONGAP 5 09/12/2019     (H) 09/12/2019    BUN 22 09/12/2019    CR 0.78 09/12/2019    GFRESTIMATED 81 09/12/2019    GFRESTBLACK >90 09/12/2019    ANTHONY 9.5 09/12/2019        A1C RESULTS:  Lab Results   Component Value Date    A1C 6.1 07/25/2016       INR RESULTS:  No results found for: INR        CC  Margarita Mcelroy MD  [facility]  [address] [place] [address]

## 2019-11-08 NOTE — LETTER
11/8/2019    GLORIA GUAMAN MD  [facility] [address] [place] [address]    RE: Bernard Dodge       Dear Colleague,    I had the pleasure of seeing Bernard Dodge in the [facility].    History of Present Illness:    Bernard Dodge is a 64 year old female followed here by Dr. Adair Morales.  She returns today for her annual visit.  Jada has a history of ischemic cardiomyopathy with an anterior ST elevation myocardial infarction.  In August 2016 she underwent a drug-eluting stent to the LAD with ongoing proximal 50% circumflex disease 60% distal circumflex disease and 20 to 30% right coronary disease.    Historically her ejection fraction has been 40 to 45% with an LAD wall motion abnormality.  There is been no significant valvular disease present.  LV size is normal.    Her recent echo was read at 60 to 65%.  I have had Dr. Adair Morales review this and he agrees the LV function is normal likely more at 55 to 60%.  Her LV size continues to be normal.    Her anginal equivalent is intrascapular discomfort which is quite severe.  This has not reoccurred.  Nuclear stress test done in 2017 showed her LAD infarct with mild lizbeth-infarction ischemia.  All the other distributions had good flow.    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  With this change in her pattern she notes some mild dyspnea on exertion with stairs otherwise she is asymptomatic.  Her A1c is 6.4%.  She follows with her primary care physician closely about this.  Her weight is actually up 20 pounds in the past 2 years which he talked about today.  Certainly would help her diabetes and overall conditioning if she attempts weight reduction which she is seriously considering another business has slowed down a bit.    Labs today show sodium of 140 potassium 4.4 BUN 22 creatinine 0.70.  She remains on furosemide 40 mg a day with spironolactone 12.5 mg daily.  Lipids  are controlled with a total cholesterol 142 HDL 59 LDL 64 triglyceride 93 on atorvastatin 80 mg/day.    Exam today as outlined below and is unremarkable with clear lungs no murmur no edema.    She notes occasional swelling in her hands that she has high sodium foods such as Chinese food the day before.  She wonders if she could take an extra furosemide    Told her rather to limit salt and try extra water sparingly use an extra 20 mg but I would prefer she limit salt in her diet first.        Impression/Plan:     1.  Coronary artery disease with history of LAD infarct in 2016 with drug-eluting stent.  Ongoing 50% proximal circumflex stenosis 60% distal circumflex stenosis and minimal right coronary disease.  She is having no angina.  LV function is normalized.  I will continue her current medical therapy and do an echocardiogram next year.  She will see Dr. Heredia back at that point.  Given no angina clinically I would not pursue a stress test at this time    2.  Dyslipidemia   -controlled however she is having gradual weight gain and given her diabetes we did discuss this today.  I would suggest to resume her exercise program as well    3.  A1c 6.4%  - being followed closely by her primary care physician    4.  Blood pressure is well controlled    5.  History of left breast cancer with lumpectomy and radiation.  The radiation oncologist BOOP  There is been no recurrence of this.    It has been a pleasure seeing Bernard Dodge in follow up today.  Greater than 50% of this 30 minute visit was spent in counseling/collaboration  She will see Dr. Heredia back in one year.    Leonard Stack, MSN, APRN-BC, CNP  Cardiology    Orders Placed This Encounter   Procedures     Basic metabolic panel     Lipid Profile     ALT     Follow-Up with Cardiologist     Echocardiogram Complete     No orders of the defined types were placed in this encounter.    There are no discontinued medications.      Encounter  Diagnosis   Name Primary?     Coronary artery disease involving native coronary artery of native heart without angina pectoris Yes       CURRENT MEDICATIONS:  Current Outpatient Medications   Medication Sig Dispense Refill     Ascorbic Acid (VITAMIN C PO) Take 1,000 mg by mouth daily       aspirin 81 MG tablet Take 1 tablet (81 mg) by mouth daily 30 tablet      atorvastatin (LIPITOR) 80 MG tablet Take 1 tablet (80 mg) by mouth daily 30 tablet 11     BIOTIN PO Take by mouth daily       carvedilol (COREG) 3.125 MG tablet Take 1 tablet (3.125 mg) by mouth 2 times daily 60 tablet 11     furosemide (LASIX) 40 MG tablet Take 1 tablet (40 mg) by mouth daily 30 tablet 1     losartan (COZAAR) 25 MG tablet Take 1 tablet (25 mg) by mouth daily 90 tablet 3     magnesium 250 MG tablet Take 1 tablet by mouth daily       nitroGLYcerin (NITROSTAT) 0.4 MG sublingual tablet Place 1 tablet (0.4 mg) under the tongue every 5 minutes as needed for chest pain if you are still having symptoms after 3 doses (15 minutes) call 911. 25 tablet 2     Probiotic Product (PROBIOTIC PO) Take 1 capsule by mouth daily       spironolactone (ALDACTONE) 25 MG tablet Take 0.5 tablets (12.5 mg) by mouth daily 30 tablet 1     VITAMIN D, CHOLECALCIFEROL, PO Take 2,000 Units by mouth daily         ALLERGIES     Allergies   Allergen Reactions     Cellcept [Mycophenolate]      Levaquin [Levofloxacin]      Lisinopril Cough       PAST MEDICAL HISTORY:  Past Medical History:   Diagnosis Date     Benign essential hypertension      BOOP (bronchiolitis obliterans with organizing pneumonia) (H)     post radiation     Breast CA (H)     left side     CAD (coronary artery disease), native coronary artery     STEMI 8/2016  PCI mid LAD 8/22/16     DM (diabetes mellitus screen)      Dyslipidemia      Ex-smoker      Fatty liver disease, nonalcoholic      Fracture of left ankle      Ischemic cardiomyopathy      Sleep apnea     Cpap       PAST SURGICAL HISTORY:  Past Surgical  History:   Procedure Laterality Date     HEART CATH, ANGIOPLASTY       STENT, CORONARY, S660 15/18  2016    MABEL=LAD       FAMILY HISTORY:  Family History   Problem Relation Age of Onset     Hypertension Mother      Myocardial Infarction Mother      Heart Disease Father      Diabetes Brother      Heart Disease Brother        SOCIAL HISTORY:  Social History     Socioeconomic History     Marital status:      Spouse name: None     Number of children: None     Years of education: None     Highest education level: None   Occupational History     None   Social Needs     Financial resource strain: None     Food insecurity:     Worry: None     Inability: None     Transportation needs:     Medical: None     Non-medical: None   Tobacco Use     Smoking status: Former Smoker     Packs/day: 1.00     Years: 25.00     Pack years: 25.00     Types: Cigarettes     Last attempt to quit:      Years since quittin.8     Smokeless tobacco: Never Used   Substance and Sexual Activity     Alcohol use: Yes     Alcohol/week: 0.0 standard drinks     Comment: a few glasses of wine weekly     Drug use: None     Sexual activity: None   Lifestyle     Physical activity:     Days per week: None     Minutes per session: None     Stress: None   Relationships     Social connections:     Talks on phone: None     Gets together: None     Attends [employer] service: None     Active member of club or organization: None     Attends meetings of clubs or organizations: None     Relationship status: None     Intimate partner violence:     Fear of current or ex partner: None     Emotionally abused: None     Physically abused: None     Forced sexual activity: None   Other Topics Concern     Parent/sibling w/ CABG, MI or angioplasty before 65F 55M? Yes      Service Not Asked     Blood Transfusions Not Asked     Caffeine Concern Yes     Comment: 1-2  cup daily     Occupational Exposure Not Asked     Hobby Hazards Not Asked     Sleep  "Concern Not Asked     Stress Concern Not Asked     Weight Concern Not Asked     Special Diet Yes     Comment: low NA     Back Care Not Asked     Exercise Yes     Comment: heart rehab, walk     Bike Helmet Not Asked     Seat Belt Not Asked     Self-Exams Not Asked   Social History Narrative     None       Review of Systems:  Skin:  Negative       Eyes:  Positive for glasses reading  ENT:  Negative      Respiratory:  Positive for sleep apnea;CPAP     Cardiovascular:  Negative      Gastroenterology: Negative      Genitourinary:  Negative      Musculoskeletal:  Positive for arthritis pt states \"normal for age\" pain  Neurologic:  Negative      Psychiatric:  Negative      Heme/Lymph/Imm:  Negative      Endocrine:  Positive for diabetes      Physical Exam:  Vitals: /64 (BP Location: Right arm, Patient Position: Sitting, Cuff Size: Adult Regular)   Pulse 72   Ht 1.575 m (5' 2\")   Wt 87.2 kg (192 lb 3.2 oz)   LMP  (LMP Unknown)   Breastfeeding? No   BMI 35.15 kg/m       Constitutional:  cooperative, alert and oriented, well developed, well nourished, in no acute distress        Skin:  warm and dry to the touch, no apparent skin lesions or masses noted          Head:  normocephalic, no masses or lesions        Eyes:  pupils equal and round, conjunctivae and lids unremarkable, sclera white, no xanthalasma, EOMS intact, no nystagmus        Lymph:      ENT:  no pallor or cyanosis, dentition good        Neck:  carotid pulses are full and equal bilaterally, JVP normal, no carotid bruit        Respiratory:  normal breath sounds, clear to auscultation, normal A-P diameter, normal symmetry, normal respiratory excursion, no use of accessory muscles         Cardiac: normal S1 and S2;regular rhythm;apical impulse not displaced   S4 no presence of murmur          pulses full and equal, no bruits auscultated                                   left radial site intact without hum or bruit    GI:  abdomen soft, non-tender, BS " normoactive, no mass, no HSM, no bruits        Extremities and Muscular Skeletal:  no deformities, clubbing, cyanosis, erythema observed              Neurological:  no gross motor deficits;affect appropriate        Psych:  Alert and Oriented x 3      Recent Lab Results:  LIPID RESULTS:  Lab Results   Component Value Date    CHOL 142 09/12/2019    HDL 59 09/12/2019    LDL 64 09/12/2019    TRIG 93 09/12/2019       LIVER ENZYME RESULTS:  Lab Results   Component Value Date    AST 47 (H) 08/22/2016    ALT 36 09/12/2019       CBC RESULTS:  Lab Results   Component Value Date    WBC 8.7 08/25/2016    RBC 4.05 08/25/2016    HGB 11.9 08/25/2016    HCT 35.4 08/25/2016    MCV 87 08/25/2016    MCH 29.4 08/25/2016    MCHC 33.6 08/25/2016    RDW 14.1 08/25/2016     08/25/2016       BMP RESULTS:  Lab Results   Component Value Date     09/12/2019    POTASSIUM 4.4 09/12/2019    CHLORIDE 106 09/12/2019    CO2 29 09/12/2019    ANIONGAP 5 09/12/2019     (H) 09/12/2019    BUN 22 09/12/2019    CR 0.78 09/12/2019    GFRESTIMATED 81 09/12/2019    GFRESTBLACK >90 09/12/2019    ANTHONY 9.5 09/12/2019        A1C RESULTS:  Lab Results   Component Value Date    A1C 6.1 07/25/2016       INR RESULTS:  No results found for: INR        Thank you for allowing me to participate in the care of your patient.    Sincerely,     CHEKO Beltran [facility]

## 2020-12-03 ENCOUNTER — DOCUMENTATION ONLY (OUTPATIENT)
Dept: CARDIOLOGY | Facility: CLINIC | Age: 65
End: 2020-12-03

## 2020-12-03 DIAGNOSIS — I25.119 CORONARY ARTERY DISEASE INVOLVING NATIVE CORONARY ARTERY OF NATIVE HEART WITH ANGINA PECTORIS (H): Primary | ICD-10-CM

## 2021-01-15 ENCOUNTER — HEALTH MAINTENANCE LETTER (OUTPATIENT)
Age: 66
End: 2021-01-15

## 2021-01-18 ENCOUNTER — HOSPITAL ENCOUNTER (OUTPATIENT)
Dept: CARDIOLOGY | Facility: CLINIC | Age: 66
Discharge: HOME | End: 2021-01-18
Attending: NURSE PRACTITIONER | Admitting: NURSE PRACTITIONER
Payer: OTHER GOVERNMENT

## 2021-01-18 DIAGNOSIS — I25.119 CORONARY ARTERY DISEASE INVOLVING NATIVE CORONARY ARTERY OF NATIVE HEART WITH ANGINA PECTORIS (H): ICD-10-CM

## 2021-01-18 DIAGNOSIS — I25.10 CORONARY ARTERY DISEASE INVOLVING NATIVE CORONARY ARTERY OF NATIVE HEART WITHOUT ANGINA PECTORIS: ICD-10-CM

## 2021-01-18 LAB
ALT SERPL W P-5'-P-CCNC: 29 U/L (ref 0–50)
ANION GAP SERPL CALCULATED.3IONS-SCNC: 2 MMOL/L (ref 3–14)
BUN SERPL-MCNC: 15 MG/DL (ref 7–30)
CALCIUM SERPL-MCNC: 9.7 MG/DL (ref 8.5–10.1)
CHLORIDE SERPL-SCNC: 106 MMOL/L (ref 94–109)
CHOLEST SERPL-MCNC: 136 MG/DL
CO2 SERPL-SCNC: 31 MMOL/L (ref 20–32)
CREAT SERPL-MCNC: 0.76 MG/DL (ref 0.52–1.04)
GFR SERPL CREATININE-BSD FRML MDRD: 83 ML/MIN/{1.73_M2}
GLUCOSE SERPL-MCNC: 132 MG/DL (ref 70–99)
HDLC SERPL-MCNC: 55 MG/DL
LDLC SERPL CALC-MCNC: 64 MG/DL
NONHDLC SERPL-MCNC: 81 MG/DL
POTASSIUM SERPL-SCNC: 4.2 MMOL/L (ref 3.4–5.3)
SODIUM SERPL-SCNC: 139 MMOL/L (ref 133–144)
TRIGL SERPL-MCNC: 86 MG/DL

## 2021-01-18 PROCEDURE — 84460 ALANINE AMINO (ALT) (SGPT): CPT | Performed by: NURSE PRACTITIONER

## 2021-01-18 PROCEDURE — 36415 COLL VENOUS BLD VENIPUNCTURE: CPT | Performed by: NURSE PRACTITIONER

## 2021-01-18 PROCEDURE — 80061 LIPID PANEL: CPT | Performed by: NURSE PRACTITIONER

## 2021-01-18 PROCEDURE — 80048 BASIC METABOLIC PNL TOTAL CA: CPT | Performed by: NURSE PRACTITIONER

## 2021-01-18 PROCEDURE — 93306 TTE W/DOPPLER COMPLETE: CPT | Mod: 26 | Performed by: INTERNAL MEDICINE

## 2021-01-18 PROCEDURE — 93306 TTE W/DOPPLER COMPLETE: CPT

## 2021-01-22 ENCOUNTER — OFFICE VISIT (OUTPATIENT)
Dept: CARDIOLOGY | Facility: CLINIC | Age: 66
End: 2021-01-22
Payer: OTHER GOVERNMENT

## 2021-01-22 VITALS
SYSTOLIC BLOOD PRESSURE: 122 MMHG | WEIGHT: 190.8 LBS | DIASTOLIC BLOOD PRESSURE: 70 MMHG | HEART RATE: 68 BPM | HEIGHT: 62 IN | BODY MASS INDEX: 35.11 KG/M2

## 2021-01-22 DIAGNOSIS — I10 ESSENTIAL HYPERTENSION: ICD-10-CM

## 2021-01-22 DIAGNOSIS — I25.10 CORONARY ARTERY DISEASE INVOLVING NATIVE CORONARY ARTERY OF NATIVE HEART WITHOUT ANGINA PECTORIS: Primary | ICD-10-CM

## 2021-01-22 DIAGNOSIS — I25.2 OLD MYOCARDIAL INFARCTION: ICD-10-CM

## 2021-01-22 DIAGNOSIS — I25.5 ISCHEMIC CARDIOMYOPATHY: ICD-10-CM

## 2021-01-22 PROCEDURE — 99214 OFFICE O/P EST MOD 30 MIN: CPT | Performed by: INTERNAL MEDICINE

## 2021-01-22 ASSESSMENT — MIFFLIN-ST. JEOR: SCORE: 1368.71

## 2021-01-22 NOTE — PROGRESS NOTES
Service Date: 01/22/2021      CARDIOLOGY FOLLOWUP VISIT      HISTORY OF PRESENT ILLNESS:  It was my pleasure to see your patient, Bernard Dodge who is a pleasant 65-year-old patient who, in the past, suffered an ST-elevation anterior myocardial infarct and underwent stenting of the left anterior descending artery.  Initially, her echocardiogram post infarct showed an EF of 40%-45%.  However, after stenting and on medical therapy, the ejection fraction returned to normal and echocardiography which was performed 4 days ago shows that that improvement has persisted and her EF is 55%-60% and there was no change from the echocardiogram of 09/2019.  The patient is on both spironolactone and furosemide and she did ask me if the diuretics could be stopped.  I think, based upon her systolic function and based upon the lack of any valvular heart disease, that that would be a good idea.  In particular, the spironolactone is probably not indicated given that her ejection fraction is now normal.  If she has any diastolic dysfunction, she will notice that her breathing will get worse or she will get ankle edema after stopping the Lasix and I warned her about that.  If, however, she remains asymptomatic, I would keep her off those 2 medications.      She is not complaining of any chest pains or chest pressure or unusual shortness of breath.  Her lipid profile shows an LDL of 64, HDL of 55 and triglycerides of 86 with a total cholesterol of 136.  Her liver function tests are normal with an ALT of 29.      Her blood pressure is normal at 122/70 with a pulse of 68 beats per minute.      IMPRESSION:   1.  Status post anterior myocardial infarct and stenting of the left anterior descending artery.  The patient is asymptomatic with respect to coronary artery disease with no symptoms suggestive of angina pectoris.   2.  Ischemic cardiomyopathy.  Her ejection fraction has returned to normal and this was first seen in 09/2019 and persists  on the most recent echocardiogram as described above.   3.  Excellent lipid profile, well within secondary prevention guidelines with no evidence of hepatic toxicity.   4.  Normotensive.      PLAN:  I do agree with the patient that we should try to stop the diuretic therapy, as her ejection fraction has now returned to normal.  We will definitely stop the spironolactone medication because with a normal EF, there is no clearcut indication for this medication anymore.  However, I will also attempt to stop the furosemide but I warned her about increasing shortness of breath, orthopnea, PND or ankle edema which would be an indication that the furosemide would have to be restarted.  I will have the patient return to see Keyla Stack in 6 months' time and me in a year.  In a year's time, I will repeat the echocardiogram.      It has been my pleasure to be involved in the care of this extremely nice patient.  I look forward to seeing her again.      cc:   Margarita Mcelroy MD    [facility]    [address] [place] [address]         MARISOL GUSTAFSON MD, [facility]             D: 2021   T: 2021   MT: MARIA      Name:     AVERY RICKETTS   MRN:      [id number]        Account:      [id number]   :      1955           Service Date: 2021      Document: [id number]

## 2021-01-22 NOTE — PROGRESS NOTES
HPI and Plan:   See dictation    Orders Placed This Encounter   Procedures     Basic metabolic panel     Lipid Profile     ALT     Basic metabolic panel     Follow-Up with Cardiologist     Follow-Up with Cardiologist     Echocardiogram Complete       No orders of the defined types were placed in this encounter.      Medications Discontinued During This Encounter   Medication Reason     furosemide (LASIX) 40 MG tablet      spironolactone (ALDACTONE) 25 MG tablet          Encounter Diagnoses   Name Primary?     Coronary artery disease involving native coronary artery of native heart without angina pectoris Yes     Essential hypertension      Ischemic cardiomyopathy      Old myocardial infarction        CURRENT MEDICATIONS:  Current Outpatient Medications   Medication Sig Dispense Refill     Ascorbic Acid (VITAMIN C PO) Take 1,000 mg by mouth daily       aspirin 81 MG tablet Take 1 tablet (81 mg) by mouth daily 30 tablet      atorvastatin (LIPITOR) 80 MG tablet Take 1 tablet (80 mg) by mouth daily 30 tablet 11     BIOTIN PO Take by mouth daily       carvedilol (COREG) 3.125 MG tablet Take 1 tablet (3.125 mg) by mouth 2 times daily 60 tablet 11     losartan (COZAAR) 25 MG tablet Take 1 tablet (25 mg) by mouth daily 90 tablet 3     magnesium 250 MG tablet Take 1 tablet by mouth daily       nitroGLYcerin (NITROSTAT) 0.4 MG sublingual tablet Place 1 tablet (0.4 mg) under the tongue every 5 minutes as needed for chest pain if you are still having symptoms after 3 doses (15 minutes) call 911. 25 tablet 2     Probiotic Product (PROBIOTIC PO) Take 1 capsule by mouth daily       VITAMIN D, CHOLECALCIFEROL, PO Take 2,000 Units by mouth daily         ALLERGIES     Allergies   Allergen Reactions     Cellcept [Mycophenolate]      Levaquin [Levofloxacin]      Lisinopril Cough       PAST MEDICAL HISTORY:  Past Medical History:   Diagnosis Date     Benign essential hypertension      BOOP (bronchiolitis obliterans with organizing  pneumonia) (H)     post radiation     Breast CA (H)     left side     CAD (coronary artery disease), native coronary artery     STEMI 2016  PCI mid LAD 16     DM (diabetes mellitus screen)      Dyslipidemia      Ex-smoker      Fatty liver disease, nonalcoholic      Fracture of left ankle      Ischemic cardiomyopathy      Sleep apnea     Cpap       PAST SURGICAL HISTORY:  Past Surgical History:   Procedure Laterality Date     HEART CATH, ANGIOPLASTY       STENT, CORONARY, S660 15/18  2016    MABEL=LAD       FAMILY HISTORY:  Family History   Problem Relation Age of Onset     Hypertension Mother      Myocardial Infarction Mother      Heart Disease Father      Diabetes Brother      Heart Disease Brother        SOCIAL HISTORY:  Social History     Socioeconomic History     Marital status:      Spouse name: None     Number of children: None     Years of education: None     Highest education level: None   Occupational History     None   Social Needs     Financial resource strain: None     Food insecurity     Worry: None     Inability: None     Transportation needs     Medical: None     Non-medical: None   Tobacco Use     Smoking status: Former Smoker     Packs/day: 1.00     Years: 25.00     Pack years: 25.00     Types: Cigarettes     Quit date:      Years since quittin.0     Smokeless tobacco: Never Used   Substance and Sexual Activity     Alcohol use: Yes     Alcohol/week: 0.0 standard drinks     Comment: a few glasses of wine weekly     Drug use: None     Sexual activity: None   Lifestyle     Physical activity     Days per week: None     Minutes per session: None     Stress: None   Relationships     Social connections     Talks on phone: None     Gets together: None     Attends [employer] service: None     Active member of club or organization: None     Attends meetings of clubs or organizations: None     Relationship status: None     Intimate partner violence     Fear of current or ex partner:  "None     Emotionally abused: None     Physically abused: None     Forced sexual activity: None   Other Topics Concern     Parent/sibling w/ CABG, MI or angioplasty before 65F 55M? Yes      Service Not Asked     Blood Transfusions Not Asked     Caffeine Concern Yes     Comment: 1-2  cup daily     Occupational Exposure Not Asked     Hobby Hazards Not Asked     Sleep Concern Not Asked     Stress Concern Not Asked     Weight Concern Not Asked     Special Diet Yes     Comment: low NA     Back Care Not Asked     Exercise Yes     Comment: heart rehab, walk     Bike Helmet Not Asked     Seat Belt Not Asked     Self-Exams Not Asked   Social History Narrative     None       Review of Systems:  Skin:  Negative       Eyes:  Positive for glasses    ENT:  Negative      Respiratory:  Positive for sleep apnea;CPAP     Cardiovascular:    Positive for;lightheadedness lightheadedness \"once in a while\" while sitting  Gastroenterology: Negative      Genitourinary:  Negative      Musculoskeletal:  Positive for arthritis;joint pain of the fingers  Neurologic:    numbness or tingling of feet the top of both feet  Psychiatric:  Negative      Heme/Lymph/Imm:  Positive for allergies RX allergies   Endocrine:  Positive for diabetes pre-diabetic    Physical Exam:  Vitals: /70   Pulse 68   Ht 1.575 m (5' 2\")   Wt 86.5 kg (190 lb 12.8 oz)   BMI 34.90 kg/m      Constitutional:  cooperative, alert and oriented, well developed, well nourished, in no acute distress overweight      Skin:  warm and dry to the touch, no apparent skin lesions or masses noted          Head:  normocephalic, no masses or lesions        Eyes:  pupils equal and round, conjunctivae and lids unremarkable, sclera white, no xanthalasma, EOMS intact, no nystagmus        Lymph:      ENT:  no pallor or cyanosis, dentition good        Neck:  carotid pulses are full and equal bilaterally, JVP normal, no carotid bruit        Respiratory:  normal breath sounds, clear " to auscultation, normal A-P diameter, normal symmetry, normal respiratory excursion, no use of accessory muscles         Cardiac: normal S1 and S2;regular rhythm;apical impulse not displaced   S4 no presence of murmur          pulses full and equal, no bruits auscultated                                   left radial site intact without hum or bruit    GI:  abdomen soft, non-tender, BS normoactive, no mass, no HSM, no bruits        Extremities and Muscular Skeletal:  no deformities, clubbing, cyanosis, erythema observed              Neurological:  no gross motor deficits;affect appropriate        Psych:  Alert and Oriented x 3        CC  Margarita Mcelroy MD  [facility]  [id number] [place] [address]

## 2021-01-22 NOTE — LETTER
1/22/2021    GLORIA GUAMAN MD  [facility] [address]   [place] [address]    RE: Bernard Dodge       Dear Colleague,    I had the pleasure of seeing Bernard Dodge in the [facility].    HPI and Plan:   See dictation    Orders Placed This Encounter   Procedures     Basic metabolic panel     Lipid Profile     ALT     Basic metabolic panel     Follow-Up with Cardiologist     Follow-Up with Cardiologist     Echocardiogram Complete       No orders of the defined types were placed in this encounter.      Medications Discontinued During This Encounter   Medication Reason     furosemide (LASIX) 40 MG tablet      spironolactone (ALDACTONE) 25 MG tablet          Encounter Diagnoses   Name Primary?     Coronary artery disease involving native coronary artery of native heart without angina pectoris Yes     Essential hypertension      Ischemic cardiomyopathy      Old myocardial infarction        CURRENT MEDICATIONS:  Current Outpatient Medications   Medication Sig Dispense Refill     Ascorbic Acid (VITAMIN C PO) Take 1,000 mg by mouth daily       aspirin 81 MG tablet Take 1 tablet (81 mg) by mouth daily 30 tablet      atorvastatin (LIPITOR) 80 MG tablet Take 1 tablet (80 mg) by mouth daily 30 tablet 11     BIOTIN PO Take by mouth daily       carvedilol (COREG) 3.125 MG tablet Take 1 tablet (3.125 mg) by mouth 2 times daily 60 tablet 11     losartan (COZAAR) 25 MG tablet Take 1 tablet (25 mg) by mouth daily 90 tablet 3     magnesium 250 MG tablet Take 1 tablet by mouth daily       nitroGLYcerin (NITROSTAT) 0.4 MG sublingual tablet Place 1 tablet (0.4 mg) under the tongue every 5 minutes as needed for chest pain if you are still having symptoms after 3 doses (15 minutes) call 911. 25 tablet 2     Probiotic Product (PROBIOTIC PO) Take 1 capsule by mouth daily       VITAMIN D, CHOLECALCIFEROL, PO Take 2,000 Units by mouth daily         ALLERGIES     Allergies   Allergen Reactions      Cellcept [Mycophenolate]      Levaquin [Levofloxacin]      Lisinopril Cough       PAST MEDICAL HISTORY:  Past Medical History:   Diagnosis Date     Benign essential hypertension      BOOP (bronchiolitis obliterans with organizing pneumonia) (H)     post radiation     Breast CA (H)     left side     CAD (coronary artery disease), native coronary artery     STEMI 2016  PCI mid LAD 16     DM (diabetes mellitus screen)      Dyslipidemia      Ex-smoker      Fatty liver disease, nonalcoholic      Fracture of left ankle      Ischemic cardiomyopathy      Sleep apnea     Cpap       PAST SURGICAL HISTORY:  Past Surgical History:   Procedure Laterality Date     HEART CATH, ANGIOPLASTY       STENT, CORONARY, S660 15/18  2016    MABEL=LAD       FAMILY HISTORY:  Family History   Problem Relation Age of Onset     Hypertension Mother      Myocardial Infarction Mother      Heart Disease Father      Diabetes Brother      Heart Disease Brother        SOCIAL HISTORY:  Social History     Socioeconomic History     Marital status:      Spouse name: None     Number of children: None     Years of education: None     Highest education level: None   Occupational History     None   Social Needs     Financial resource strain: None     Food insecurity     Worry: None     Inability: None     Transportation needs     Medical: None     Non-medical: None   Tobacco Use     Smoking status: Former Smoker     Packs/day: 1.00     Years: 25.00     Pack years: 25.00     Types: Cigarettes     Quit date:      Years since quittin.0     Smokeless tobacco: Never Used   Substance and Sexual Activity     Alcohol use: Yes     Alcohol/week: 0.0 standard drinks     Comment: a few glasses of wine weekly     Drug use: None     Sexual activity: None   Lifestyle     Physical activity     Days per week: None     Minutes per session: None     Stress: None   Relationships     Social connections     Talks on phone: None     Gets together: None      "Attends [employer] service: None     Active member of club or organization: None     Attends meetings of clubs or organizations: None     Relationship status: None     Intimate partner violence     Fear of current or ex partner: None     Emotionally abused: None     Physically abused: None     Forced sexual activity: None   Other Topics Concern     Parent/sibling w/ CABG, MI or angioplasty before 65F 55M? Yes      Service Not Asked     Blood Transfusions Not Asked     Caffeine Concern Yes     Comment: 1-2  cup daily     Occupational Exposure Not Asked     Hobby Hazards Not Asked     Sleep Concern Not Asked     Stress Concern Not Asked     Weight Concern Not Asked     Special Diet Yes     Comment: low NA     Back Care Not Asked     Exercise Yes     Comment: heart rehab, walk     Bike Helmet Not Asked     Seat Belt Not Asked     Self-Exams Not Asked   Social History Narrative     None       Review of Systems:  Skin:  Negative       Eyes:  Positive for glasses    ENT:  Negative      Respiratory:  Positive for sleep apnea;CPAP     Cardiovascular:    Positive for;lightheadedness lightheadedness \"once in a while\" while sitting  Gastroenterology: Negative      Genitourinary:  Negative      Musculoskeletal:  Positive for arthritis;joint pain of the fingers  Neurologic:    numbness or tingling of feet the top of both feet  Psychiatric:  Negative      Heme/Lymph/Imm:  Positive for allergies RX allergies   Endocrine:  Positive for diabetes pre-diabetic    Physical Exam:  Vitals: /70   Pulse 68   Ht 1.575 m (5' 2\")   Wt 86.5 kg (190 lb 12.8 oz)   BMI 34.90 kg/m      Constitutional:  cooperative, alert and oriented, well developed, well nourished, in no acute distress overweight      Skin:  warm and dry to the touch, no apparent skin lesions or masses noted          Head:  normocephalic, no masses or lesions        Eyes:  pupils equal and round, conjunctivae and lids unremarkable, sclera white, no xanthalasma, " EOMS intact, no nystagmus        Lymph:      ENT:  no pallor or cyanosis, dentition good        Neck:  carotid pulses are full and equal bilaterally, JVP normal, no carotid bruit        Respiratory:  normal breath sounds, clear to auscultation, normal A-P diameter, normal symmetry, normal respiratory excursion, no use of accessory muscles         Cardiac: normal S1 and S2;regular rhythm;apical impulse not displaced   S4 no presence of murmur          pulses full and equal, no bruits auscultated                                   left radial site intact without hum or bruit    GI:  abdomen soft, non-tender, BS normoactive, no mass, no HSM, no bruits        Extremities and Muscular Skeletal:  no deformities, clubbing, cyanosis, erythema observed              Neurological:  no gross motor deficits;affect appropriate        Psych:  Alert and Oriented x 3        CC  Margarita Mcelroy MD  [facility]  [address] [place] [address]                  Thank you for allowing me to participate in the care of your patient.      Sincerely,     Tim Heredia MD, MD     [facility]    cc:   Margarita Mcelroy MD  [facility]  [address] [place] [address]

## 2021-01-22 NOTE — LETTER
1/22/2021      GLORIA GUAMAN MD  [facility] [address]   [place] [address]      RE: Bernard Dodge       Dear Colleague,    I had the pleasure of seeing Bernard Dodge in the [facility].    Service Date: 01/22/2021      CARDIOLOGY FOLLOWUP VISIT      HISTORY OF PRESENT ILLNESS:  It was my pleasure to see your patient, Bernard Dodge who is a pleasant 65-year-old patient who, in the past, suffered an ST-elevation anterior myocardial infarct and underwent stenting of the left anterior descending artery.  Initially, her echocardiogram post infarct showed an EF of 40%-45%.  However, after stenting and on medical therapy, the ejection fraction returned to normal and echocardiography which was performed 4 days ago shows that that improvement has persisted and her EF is 55%-60% and there was no change from the echocardiogram of 09/2019.  The patient is on both spironolactone and furosemide and she did ask me if the diuretics could be stopped.  I think, based upon her systolic function and based upon the lack of any valvular heart disease, that that would be a good idea.  In particular, the spironolactone is probably not indicated given that her ejection fraction is now normal.  If she has any diastolic dysfunction, she will notice that her breathing will get worse or she will get ankle edema after stopping the Lasix and I warned her about that.  If, however, she remains asymptomatic, I would keep her off those 2 medications.      She is not complaining of any chest pains or chest pressure or unusual shortness of breath.  Her lipid profile shows an LDL of 64, HDL of 55 and triglycerides of 86 with a total cholesterol of 136.  Her liver function tests are normal with an ALT of 29.      Her blood pressure is normal at 122/70 with a pulse of 68 beats per minute.      IMPRESSION:   1.  Status post anterior myocardial infarct and stenting of the left anterior descending artery.  The  patient is asymptomatic with respect to coronary artery disease with no symptoms suggestive of angina pectoris.   2.  Ischemic cardiomyopathy.  Her ejection fraction has returned to normal and this was first seen in 2019 and persists on the most recent echocardiogram as described above.   3.  Excellent lipid profile, well within secondary prevention guidelines with no evidence of hepatic toxicity.   4.  Normotensive.      PLAN:  I do agree with the patient that we should try to stop the diuretic therapy, as her ejection fraction has now returned to normal.  We will definitely stop the spironolactone medication because with a normal EF, there is no clearcut indication for this medication anymore.  However, I will also attempt to stop the furosemide but I warned her about increasing shortness of breath, orthopnea, PND or ankle edema which would be an indication that the furosemide would have to be restarted.  I will have the patient return to see Keyla Stack in 6 months' time and me in a year.  In a year's time, I will repeat the echocardiogram.      It has been my pleasure to be involved in the care of this extremely nice patient.  I look forward to seeing her again.      cc:   Margarita Mcelroy MD    [facility]    [address] [place] [address]         MARISOL GUSTAFSON MD, [facility]         D: 2021   T: 2021   MT: MARIA      Name:     AVERY RICKETTS   MRN:      [id number]        Account:      [id number]   :      1955           Service Date: 2021      Document: [id number]        Outpatient Encounter Medications as of 2021   Medication Sig Dispense Refill     Ascorbic Acid (VITAMIN C PO) Take 1,000 mg by mouth daily       aspirin 81 MG tablet Take 1 tablet (81 mg) by mouth daily 30 tablet      atorvastatin (LIPITOR) 80 MG tablet Take 1 tablet (80 mg) by mouth daily 30 tablet 11     BIOTIN PO Take by mouth daily       carvedilol (COREG) 3.125 MG tablet Take 1  tablet (3.125 mg) by mouth 2 times daily 60 tablet 11     losartan (COZAAR) 25 MG tablet Take 1 tablet (25 mg) by mouth daily 90 tablet 3     magnesium 250 MG tablet Take 1 tablet by mouth daily       nitroGLYcerin (NITROSTAT) 0.4 MG sublingual tablet Place 1 tablet (0.4 mg) under the tongue every 5 minutes as needed for chest pain if you are still having symptoms after 3 doses (15 minutes) call 911. 25 tablet 2     Probiotic Product (PROBIOTIC PO) Take 1 capsule by mouth daily       VITAMIN D, CHOLECALCIFEROL, PO Take 2,000 Units by mouth daily       [DISCONTINUED] furosemide (LASIX) 40 MG tablet Take 1 tablet (40 mg) by mouth daily 30 tablet 1     [DISCONTINUED] spironolactone (ALDACTONE) 25 MG tablet Take 0.5 tablets (12.5 mg) by mouth daily 30 tablet 1     No facility-administered encounter medications on file as of 1/22/2021.        Again, thank you for allowing me to participate in the care of your patient.      Sincerely,    Tim Heredia MD, MD     [facility]

## 2021-04-28 ENCOUNTER — IMMUNIZATION (OUTPATIENT)
Dept: NURSING | Facility: CLINIC | Age: 66
End: 2021-04-28
Payer: OTHER GOVERNMENT

## 2021-04-28 PROCEDURE — 91300 PR COVID VAC PFIZER DIL RECON 30 MCG/0.3 ML IM: CPT

## 2021-04-28 PROCEDURE — 0001A PR COVID VAC PFIZER DIL RECON 30 MCG/0.3 ML IM: CPT

## 2021-05-19 ENCOUNTER — IMMUNIZATION (OUTPATIENT)
Dept: NURSING | Facility: CLINIC | Age: 66
End: 2021-05-19
Attending: INTERNAL MEDICINE
Payer: OTHER GOVERNMENT

## 2021-05-19 PROCEDURE — 91300 PR COVID VAC PFIZER DIL RECON 30 MCG/0.3 ML IM: CPT

## 2021-05-19 PROCEDURE — 0002A PR COVID VAC PFIZER DIL RECON 30 MCG/0.3 ML IM: CPT

## 2021-08-24 ENCOUNTER — LAB (OUTPATIENT)
Dept: LAB | Facility: CLINIC | Age: 66
End: 2021-08-24
Payer: OTHER GOVERNMENT

## 2021-08-24 DIAGNOSIS — I10 ESSENTIAL HYPERTENSION: ICD-10-CM

## 2021-08-24 LAB
ANION GAP SERPL CALCULATED.3IONS-SCNC: 3 MMOL/L (ref 3–14)
BUN SERPL-MCNC: 23 MG/DL (ref 7–30)
CALCIUM SERPL-MCNC: 9.5 MG/DL (ref 8.5–10.1)
CHLORIDE BLD-SCNC: 106 MMOL/L (ref 94–109)
CO2 SERPL-SCNC: 29 MMOL/L (ref 20–32)
CREAT SERPL-MCNC: 0.8 MG/DL (ref 0.52–1.04)
GFR SERPL CREATININE-BSD FRML MDRD: 78 ML/MIN/1.73M2
GLUCOSE BLD-MCNC: 102 MG/DL (ref 70–99)
POTASSIUM BLD-SCNC: 4.4 MMOL/L (ref 3.4–5.3)
SODIUM SERPL-SCNC: 138 MMOL/L (ref 133–144)

## 2021-08-24 PROCEDURE — 80048 BASIC METABOLIC PNL TOTAL CA: CPT | Performed by: INTERNAL MEDICINE

## 2021-08-24 PROCEDURE — 36415 COLL VENOUS BLD VENIPUNCTURE: CPT | Performed by: INTERNAL MEDICINE

## 2021-08-26 ENCOUNTER — OFFICE VISIT (OUTPATIENT)
Dept: CARDIOLOGY | Facility: CLINIC | Age: 66
End: 2021-08-26
Payer: OTHER GOVERNMENT

## 2021-08-26 VITALS
BODY MASS INDEX: 28.89 KG/M2 | OXYGEN SATURATION: 97 % | SYSTOLIC BLOOD PRESSURE: 124 MMHG | WEIGHT: 157 LBS | HEART RATE: 52 BPM | DIASTOLIC BLOOD PRESSURE: 70 MMHG | HEIGHT: 62 IN

## 2021-08-26 DIAGNOSIS — I25.10 CORONARY ARTERY DISEASE INVOLVING NATIVE CORONARY ARTERY OF NATIVE HEART WITHOUT ANGINA PECTORIS: ICD-10-CM

## 2021-08-26 DIAGNOSIS — I25.5 ISCHEMIC CARDIOMYOPATHY: ICD-10-CM

## 2021-08-26 DIAGNOSIS — I25.2 OLD MYOCARDIAL INFARCTION: ICD-10-CM

## 2021-08-26 DIAGNOSIS — I10 ESSENTIAL HYPERTENSION: ICD-10-CM

## 2021-08-26 PROCEDURE — 99214 OFFICE O/P EST MOD 30 MIN: CPT | Performed by: PHYSICIAN ASSISTANT

## 2021-08-26 ASSESSMENT — MIFFLIN-ST. JEOR: SCORE: 1215.4

## 2021-08-26 NOTE — PROGRESS NOTES
CARDIOLOGY CLINIC PROGRESS NOTE    DOS: 2021      Bernard Dodge  : 1955, 65 year old  MRN: [id number]      History:  I am seeing Bernard Dodge today for follow up.  She is a patient of Dr. Heredia.   Her mom, Tyra Caballero, also sees Dr. Heredia and myself.     Bernard Dodge is a pleasant 65-year-old patient with history of CAD, ischemic cardiomyopathy, SHRUTHI on CPAP.     She suffered an ST-elevation anterior myocardial infarct 2016 and underwent stenting of the left anterior descending artery.  Initially, her echocardiogram post infarct showed an EF of 40%-45%.  However, after stenting and on medical therapy, the ejection fraction returned to normal.  Most recent echo 21 shows that that improvement has persisted and her EF is 55%-60% and there was no change from the echocardiogram of 2019.      She last saw Dr. Heredia 21.  They stopped spironolactone and Lasix given her EF is normal.      She presents today for follow up.   She started a new diet called Optivia.  Her A1C is down to 5.4. She is down from 190 lbs to 157 lbs!!!  She is off both diuretics.   She has had no new edema, SOB, PIERCE, orthopnea.   She is not complaining of any chest pains or chest pressure or unusual shortness of breath.    Her lipid profile 21 shows an LDL of 64, HDL of 55 and triglycerides of 86 with a total cholesterol of 136.  Her 21 liver function tests are normal with an ALT of 29.   Her blood pressure is normal at 124/70.         ROS:  Skin:  Negative     Eyes:  Positive for glasses  ENT:  Negative    Respiratory:  Positive for sleep apnea;CPAP  Cardiovascular:  Negative    Gastroenterology: Negative    Genitourinary:  Negative    Musculoskeletal:  Positive for back pain  Neurologic:  Negative    Psychiatric:  Negative    Heme/Lymph/Imm:  Negative    Endocrine:  Positive for      PAST MEDICAL HISTORY:  Past Medical History:   Diagnosis Date     Benign essential  hypertension      BOOP (bronchiolitis obliterans with organizing pneumonia) (H)     post radiation     Breast CA (H)     left side     CAD (coronary artery disease), native coronary artery     STEMI 2016  PCI mid LAD 16     DM (diabetes mellitus screen)      Dyslipidemia      Ex-smoker      Fatty liver disease, nonalcoholic      Fracture of left ankle      Ischemic cardiomyopathy      Sleep apnea     Cpap       PAST SURGICAL HISTORY:  Past Surgical History:   Procedure Laterality Date     HEART CATH, ANGIOPLASTY       STENT, CORONARY, S660 15/18  2016    MABEL=LAD       SOCIAL HISTORY:  Social History     Socioeconomic History     Marital status:      Spouse name: None     Number of children: None     Years of education: None     Highest education level: None   Occupational History     None   Tobacco Use     Smoking status: Former Smoker     Packs/day: 1.00     Years: 25.00     Pack years: 25.00     Types: Cigarettes     Quit date:      Years since quittin.6     Smokeless tobacco: Never Used   Substance and Sexual Activity     Alcohol use: Yes     Alcohol/week: 0.0 standard drinks     Comment: a few glasses of wine weekly     Drug use: None     Sexual activity: None   Other Topics Concern     Parent/sibling w/ CABG, MI or angioplasty before 65F 55M? Yes      Service Not Asked     Blood Transfusions Not Asked     Caffeine Concern Yes     Comment: 1-2  cup daily     Occupational Exposure Not Asked     Hobby Hazards Not Asked     Sleep Concern Not Asked     Stress Concern Not Asked     Weight Concern Not Asked     Special Diet Yes     Comment: low NA     Back Care Not Asked     Exercise Yes     Comment: heart rehab, walk     Bike Helmet Not Asked     Seat Belt Not Asked     Self-Exams Not Asked   Social History Narrative     None     Social Determinants of Health     Financial Resource Strain:      Difficulty of Paying Living Expenses:    Food Insecurity:      Worried About Running  "Out of Food in the Last Year:      Ran Out of Food in the Last Year:    Transportation Needs:      Lack of Transportation (Medical):      Lack of Transportation (Non-Medical):    Physical Activity:      Days of Exercise per Week:      Minutes of Exercise per Session:    Stress:      Feeling of Stress :    Social Connections:      Frequency of Communication with Friends and Family:      Frequency of Social Gatherings with Friends and Family:      Attends [employer] Services:      Active Member of Clubs or Organizations:      Attends Club or Organization Meetings:      Marital Status:    Intimate Partner Violence:      Fear of Current or Ex-Partner:      Emotionally Abused:      Physically Abused:      Sexually Abused:        FAMILY HISTORY:  Family History   Problem Relation Age of Onset     Hypertension Mother      Myocardial Infarction Mother      Heart Disease Father      Diabetes Brother      Heart Disease Brother        MEDS: aspirin 81 MG tablet, Take 1 tablet (81 mg) by mouth daily  atorvastatin (LIPITOR) 80 MG tablet, Take 1 tablet (80 mg) by mouth daily  carvedilol (COREG) 3.125 MG tablet, Take 1 tablet (3.125 mg) by mouth 2 times daily  losartan (COZAAR) 25 MG tablet, Take 1 tablet (25 mg) by mouth daily  nitroGLYcerin (NITROSTAT) 0.4 MG sublingual tablet, Place 1 tablet (0.4 mg) under the tongue every 5 minutes as needed for chest pain if you are still having symptoms after 3 doses (15 minutes) call 911.  Probiotic Product (PROBIOTIC PO), Take 1 capsule by mouth daily    No current facility-administered medications on file prior to visit.      ALLERGIES:   Allergies   Allergen Reactions     Cellcept [Mycophenolate]      Levaquin [Levofloxacin]      Lisinopril Cough       PHYSICAL EXAM:  Vitals: /70 (BP Location: Right arm, Patient Position: Sitting, Cuff Size: Adult Large)   Pulse 52   Ht 1.575 m (5' 2\")   Wt 71.2 kg (157 lb)   SpO2 97%   BMI 28.72 kg/m    Constitutional:  cooperative, alert and " oriented, well developed, well nourished, in no acute distress overweight      Skin:  warm and dry to the touch, no apparent skin lesions or masses noted        Head:  normocephalic, no masses or lesions        Eyes:  pupils equal and round;conjunctivae and lids unremarkable;sclera white        ENT:  no pallor or cyanosis        Neck:  JVP normal;no carotid bruit        Respiratory:  normal breath sounds, clear to auscultation, normal A-P diameter, normal symmetry, normal respiratory excursion, no use of accessory muscles        Cardiac: normal S1 and S2;regular rhythm;apical impulse not displaced     no presence of murmur            GI:  abdomen soft;BS normoactive        Vascular:                                        Extremities and Musculoskeletal:  no deformities, clubbing, cyanosis, erythema observed;no edema        Neurological:  no gross motor deficits;affect appropriate            LABS/DATA:  I reviewed the following:  Component      Latest Ref Rng & Units 8/24/2021   Sodium      133 - 144 mmol/L 138   Potassium      3.4 - 5.3 mmol/L 4.4   Chloride      94 - 109 mmol/L 106   Carbon Dioxide      20 - 32 mmol/L 29   Anion Gap      3 - 14 mmol/L 3   Urea Nitrogen      7 - 30 mg/dL 23   Creatinine      0.52 - 1.04 mg/dL 0.80   Calcium      8.5 - 10.1 mg/dL 9.5   Glucose      70 - 99 mg/dL 102 (H)   GFR Estimate      >60 mL/min/1.73m2 78         ASSESSMENT/PLAN:  1.  CAD s/p anterior myocardial infarct and stenting of the left anterior descending artery.    The patient is asymptomatic with respect to coronary artery disease with no symptoms suggestive of angina pectoris.   Continue ASA, BB, ARB, atorvastatin 80 mg  Continued lifestyle changes!    2.  Ischemic cardiomyopathy.    Her ejection fraction returned to normal 09/2019 and persists on the most recent echocardiogram as described above.   She is now off diuretics and doing well.   She remains on BB and ARB.     3.  Excellent lipid profile, well within  secondary prevention guidelines with no evidence of hepatic toxicity.     4.  Normotensive.         Follow up:  Jan 2022 with echo and Dr. Heredia and FLP, BMP          Karime Mack PA-C

## 2021-08-26 NOTE — LETTER
2021    Kumar Webber MD  [address] [place] [address]    RE: Bernard Dodge       Dear Colleague,    I had the pleasure of seeing Bernard Dodge in the [facility].        CARDIOLOGY CLINIC PROGRESS NOTE    DOS: 2021      Bernard Dodge  : 1955, 65 year old  MRN: [id number]      History:  I am seeing Bernard Dodge today for follow up.  She is a patient of Dr. Heredia.   Her mom, Tyra Caballero, also sees Dr. Heredia and myself.     Bernard Dodge is a pleasant 65-year-old patient with history of CAD, ischemic cardiomyopathy, SHRUTHI on CPAP.     She suffered an ST-elevation anterior myocardial infarct 2016 and underwent stenting of the left anterior descending artery.  Initially, her echocardiogram post infarct showed an EF of 40%-45%.  However, after stenting and on medical therapy, the ejection fraction returned to normal.  Most recent echo 21 shows that that improvement has persisted and her EF is 55%-60% and there was no change from the echocardiogram of 2019.      She last saw Dr. Heredia 21.  They stopped spironolactone and Lasix given her EF is normal.      She presents today for follow up.   She started a new diet called Optivia.  Her A1C is down to 5.4. She is down from 190 lbs to 157 lbs!!!  She is off both diuretics.   She has had no new edema, SOB, PIERCE, orthopnea.   She is not complaining of any chest pains or chest pressure or unusual shortness of breath.    Her lipid profile 21 shows an LDL of 64, HDL of 55 and triglycerides of 86 with a total cholesterol of 136.  Her 21 liver function tests are normal with an ALT of 29.   Her blood pressure is normal at 124/70.         ROS:  Skin:  Negative     Eyes:  Positive for glasses  ENT:  Negative    Respiratory:  Positive for sleep apnea;CPAP  Cardiovascular:  Negative    Gastroenterology: Negative    Genitourinary:  Negative     Musculoskeletal:  Positive for back pain  Neurologic:  Negative    Psychiatric:  Negative    Heme/Lymph/Imm:  Negative    Endocrine:  Positive for      PAST MEDICAL HISTORY:  Past Medical History:   Diagnosis Date     Benign essential hypertension      BOOP (bronchiolitis obliterans with organizing pneumonia) (H)     post radiation     Breast CA (H)     left side     CAD (coronary artery disease), native coronary artery     STEMI 2016  PCI mid LAD 16     DM (diabetes mellitus screen)      Dyslipidemia      Ex-smoker      Fatty liver disease, nonalcoholic      Fracture of left ankle      Ischemic cardiomyopathy      Sleep apnea     Cpap       PAST SURGICAL HISTORY:  Past Surgical History:   Procedure Laterality Date     HEART CATH, ANGIOPLASTY       STENT, CORONARY, S660 15/18  2016    MABEL=LAD       SOCIAL HISTORY:  Social History     Socioeconomic History     Marital status:      Spouse name: None     Number of children: None     Years of education: None     Highest education level: None   Occupational History     None   Tobacco Use     Smoking status: Former Smoker     Packs/day: 1.00     Years: 25.00     Pack years: 25.00     Types: Cigarettes     Quit date:      Years since quittin.6     Smokeless tobacco: Never Used   Substance and Sexual Activity     Alcohol use: Yes     Alcohol/week: 0.0 standard drinks     Comment: a few glasses of wine weekly     Drug use: None     Sexual activity: None   Other Topics Concern     Parent/sibling w/ CABG, MI or angioplasty before 65F 55M? Yes      Service Not Asked     Blood Transfusions Not Asked     Caffeine Concern Yes     Comment: 1-2  cup daily     Occupational Exposure Not Asked     Hobby Hazards Not Asked     Sleep Concern Not Asked     Stress Concern Not Asked     Weight Concern Not Asked     Special Diet Yes     Comment: low NA     Back Care Not Asked     Exercise Yes     Comment: heart rehab, walk     Bike Helmet Not Asked      Seat Belt Not Asked     Self-Exams Not Asked   Social History Narrative     None     Social Determinants of Health     Financial Resource Strain:      Difficulty of Paying Living Expenses:    Food Insecurity:      Worried About Running Out of Food in the Last Year:      Ran Out of Food in the Last Year:    Transportation Needs:      Lack of Transportation (Medical):      Lack of Transportation (Non-Medical):    Physical Activity:      Days of Exercise per Week:      Minutes of Exercise per Session:    Stress:      Feeling of Stress :    Social Connections:      Frequency of Communication with Friends and Family:      Frequency of Social Gatherings with Friends and Family:      Attends Jew Services:      Active Member of Clubs or Organizations:      Attends Club or Organization Meetings:      Marital Status:    Intimate Partner Violence:      Fear of Current or Ex-Partner:      Emotionally Abused:      Physically Abused:      Sexually Abused:        FAMILY HISTORY:  Family History   Problem Relation Age of Onset     Hypertension Mother      Myocardial Infarction Mother      Heart Disease Father      Diabetes Brother      Heart Disease Brother        MEDS: aspirin 81 MG tablet, Take 1 tablet (81 mg) by mouth daily  atorvastatin (LIPITOR) 80 MG tablet, Take 1 tablet (80 mg) by mouth daily  carvedilol (COREG) 3.125 MG tablet, Take 1 tablet (3.125 mg) by mouth 2 times daily  losartan (COZAAR) 25 MG tablet, Take 1 tablet (25 mg) by mouth daily  nitroGLYcerin (NITROSTAT) 0.4 MG sublingual tablet, Place 1 tablet (0.4 mg) under the tongue every 5 minutes as needed for chest pain if you are still having symptoms after 3 doses (15 minutes) call 911.  Probiotic Product (PROBIOTIC PO), Take 1 capsule by mouth daily    No current facility-administered medications on file prior to visit.      ALLERGIES:   Allergies   Allergen Reactions     Cellcept [Mycophenolate]      Levaquin [Levofloxacin]      Lisinopril Cough  "      PHYSICAL EXAM:  Vitals: /70 (BP Location: Right arm, Patient Position: Sitting, Cuff Size: Adult Large)   Pulse 52   Ht 1.575 m (5' 2\")   Wt 71.2 kg (157 lb)   SpO2 97%   BMI 28.72 kg/m    Constitutional:  cooperative, alert and oriented, well developed, well nourished, in no acute distress overweight      Skin:  warm and dry to the touch, no apparent skin lesions or masses noted        Head:  normocephalic, no masses or lesions        Eyes:  pupils equal and round;conjunctivae and lids unremarkable;sclera white        ENT:  no pallor or cyanosis        Neck:  JVP normal;no carotid bruit        Respiratory:  normal breath sounds, clear to auscultation, normal A-P diameter, normal symmetry, normal respiratory excursion, no use of accessory muscles        Cardiac: normal S1 and S2;regular rhythm;apical impulse not displaced     no presence of murmur            GI:  abdomen soft;BS normoactive        Vascular:                                        Extremities and Musculoskeletal:  no deformities, clubbing, cyanosis, erythema observed;no edema        Neurological:  no gross motor deficits;affect appropriate            LABS/DATA:  I reviewed the following:  Component      Latest Ref Rng & Units 8/24/2021   Sodium      133 - 144 mmol/L 138   Potassium      3.4 - 5.3 mmol/L 4.4   Chloride      94 - 109 mmol/L 106   Carbon Dioxide      20 - 32 mmol/L 29   Anion Gap      3 - 14 mmol/L 3   Urea Nitrogen      7 - 30 mg/dL 23   Creatinine      0.52 - 1.04 mg/dL 0.80   Calcium      8.5 - 10.1 mg/dL 9.5   Glucose      70 - 99 mg/dL 102 (H)   GFR Estimate      >60 mL/min/1.73m2 78         ASSESSMENT/PLAN:  1.  CAD s/p anterior myocardial infarct and stenting of the left anterior descending artery.    The patient is asymptomatic with respect to coronary artery disease with no symptoms suggestive of angina pectoris.   Continue ASA, BB, ARB, atorvastatin 80 mg  Continued lifestyle changes!    2.  Ischemic " cardiomyopathy.    Her ejection fraction returned to normal 09/2019 and persists on the most recent echocardiogram as described above.   She is now off diuretics and doing well.   She remains on BB and ARB.     3.  Excellent lipid profile, well within secondary prevention guidelines with no evidence of hepatic toxicity.     4.  Normotensive.         Follow up:  Jan 2022 with echo and Dr. Heredia and [facility], [facility]          Karime Mack PA-C      Thank you for allowing me to participate in the care of your patient.      Sincerely,     Karime Mack PA-C     [facility]  cc:   Tim Heredia MD  [address]  [place] [address]

## 2021-09-04 ENCOUNTER — HEALTH MAINTENANCE LETTER (OUTPATIENT)
Age: 66
End: 2021-09-04

## 2021-10-30 ENCOUNTER — HEALTH MAINTENANCE LETTER (OUTPATIENT)
Age: 66
End: 2021-10-30

## 2021-12-12 ENCOUNTER — HOSPITAL ENCOUNTER (EMERGENCY)
Facility: CLINIC | Age: 66
Discharge: HOME | End: 2021-12-12
Attending: EMERGENCY MEDICINE | Admitting: EMERGENCY MEDICINE
Payer: MEDICARE

## 2021-12-12 VITALS
BODY MASS INDEX: 26.87 KG/M2 | OXYGEN SATURATION: 94 % | HEART RATE: 88 BPM | WEIGHT: 146 LBS | SYSTOLIC BLOOD PRESSURE: 179 MMHG | HEIGHT: 62 IN | RESPIRATION RATE: 20 BRPM | DIASTOLIC BLOOD PRESSURE: 83 MMHG | TEMPERATURE: 98.9 F

## 2021-12-12 DIAGNOSIS — U07.1 COVID-19: ICD-10-CM

## 2021-12-12 DIAGNOSIS — R04.0 EPISTAXIS: ICD-10-CM

## 2021-12-12 LAB
BASOPHILS # BLD AUTO: 0 10E3/UL (ref 0–0.2)
BASOPHILS NFR BLD AUTO: 0 %
EOSINOPHIL # BLD AUTO: 0.1 10E3/UL (ref 0–0.7)
EOSINOPHIL NFR BLD AUTO: 2 %
ERYTHROCYTE [DISTWIDTH] IN BLOOD BY AUTOMATED COUNT: 13.2 % (ref 10–15)
HCT VFR BLD AUTO: 44.9 % (ref 35–47)
HGB BLD-MCNC: 14.3 G/DL (ref 11.7–15.7)
IMM GRANULOCYTES # BLD: 0 10E3/UL
IMM GRANULOCYTES NFR BLD: 0 %
LYMPHOCYTES # BLD AUTO: 1.2 10E3/UL (ref 0.8–5.3)
LYMPHOCYTES NFR BLD AUTO: 24 %
MCH RBC QN AUTO: 28 PG (ref 26.5–33)
MCHC RBC AUTO-ENTMCNC: 31.8 G/DL (ref 31.5–36.5)
MCV RBC AUTO: 88 FL (ref 78–100)
MONOCYTES # BLD AUTO: 0.4 10E3/UL (ref 0–1.3)
MONOCYTES NFR BLD AUTO: 7 %
NEUTROPHILS # BLD AUTO: 3.2 10E3/UL (ref 1.6–8.3)
NEUTROPHILS NFR BLD AUTO: 67 %
NRBC # BLD AUTO: 0 10E3/UL
NRBC BLD AUTO-RTO: 0 /100
PLATELET # BLD AUTO: 257 10E3/UL (ref 150–450)
RBC # BLD AUTO: 5.1 10E6/UL (ref 3.8–5.2)
WBC # BLD AUTO: 4.9 10E3/UL (ref 4–11)

## 2021-12-12 PROCEDURE — 36415 COLL VENOUS BLD VENIPUNCTURE: CPT | Performed by: EMERGENCY MEDICINE

## 2021-12-12 PROCEDURE — 85025 COMPLETE CBC W/AUTO DIFF WBC: CPT | Performed by: EMERGENCY MEDICINE

## 2021-12-12 PROCEDURE — 99283 EMERGENCY DEPT VISIT LOW MDM: CPT

## 2021-12-12 RX ORDER — OXYMETAZOLINE HYDROCHLORIDE 0.05 G/100ML
SPRAY NASAL
Status: DISCONTINUED
Start: 2021-12-12 | End: 2021-12-12 | Stop reason: HOSPADM

## 2021-12-12 ASSESSMENT — ENCOUNTER SYMPTOMS
HEADACHES: 1
FEVER: 0
SHORTNESS OF BREATH: 0
NAUSEA: 0
CHILLS: 0
VOMITING: 0

## 2021-12-12 ASSESSMENT — MIFFLIN-ST. JEOR: SCORE: 1160.5

## 2021-12-12 NOTE — ED TRIAGE NOTES
Pt tested positive for covid Wednesday, had a nose bleed that started this morning, coming mostly from left nare. Pt states she can feel blood running down her throat. Also c/o slight headache.

## 2021-12-12 NOTE — ED PROVIDER NOTES
History     Chief Complaint:  Epistaxis      HPI   Bernard Dodge is a 66 year old female past medical history of coronary artery disease and ischemic cardiomyopathy who presents with epistaxis that began at 6 AM this morning.  Notes ongoing bleeding from the left nare that occasionally runs down the back of her throat.  No other sources of bleeding.  Patient is on aspirin but no other blood thinning medications.  Also of note patient tested positive for Covid on Wednesday but had symptoms beginning on Monday including fever, cough and dull headache.  No significant dyspnea, chest pain or exertional shortness of breath.  She has not had history of nosebleeds in the past.  No history of nasal surgeries.  She is vaccinated against Covid.    Review of Systems   Constitutional: Negative for chills and fever.   HENT:        Epistaxis   Respiratory: Negative for shortness of breath.    Cardiovascular: Negative for chest pain.   Gastrointestinal: Negative for nausea and vomiting.   Neurological: Positive for headaches.   All other systems reviewed and are negative.    Allergies:  Cellcept [Mycophenolate]  Levaquin [Levofloxacin]  Lisinopril    Medications:    aspirin 81 MG tablet  atorvastatin (LIPITOR) 80 MG tablet  carvedilol (COREG) 3.125 MG tablet  losartan (COZAAR) 25 MG tablet  nitroGLYcerin (NITROSTAT) 0.4 MG sublingual tablet  Probiotic Product (PROBIOTIC PO)      Past Medical History:    Past Medical History:   Diagnosis Date     Benign essential hypertension      BOOP (bronchiolitis obliterans with organizing pneumonia) (H)      Breast CA (H)      CAD (coronary artery disease), native coronary artery      DM (diabetes mellitus screen)      Dyslipidemia      Ex-smoker      Fatty liver disease, nonalcoholic      Fracture of left ankle      Ischemic cardiomyopathy      Sleep apnea      Patient Active Problem List    Diagnosis Date Noted     Ischemic cardiomyopathy      Priority: Medium     8/23/2016 - EF 30-35%  "by echo       CAD (coronary artery disease), native coronary artery      Priority: Medium     8/22/2016 - STEMI with PCI to mLAD using 32 x 2.5 mm everolimus eluting Promus premier stent       Dyslipidemia      Priority: Medium     Sleep apnea      Priority: Medium     Cpap       Benign essential hypertension      Priority: Medium     ST elevation (STEMI) myocardial infarction (H) 08/22/2016     Priority: Medium        Past Surgical History:    Past Surgical History:   Procedure Laterality Date     HEART CATH, ANGIOPLASTY       STENT, CORONARY, S660 15/18  08/22/2016    MABEL=LAD       Family History:    Family History   Problem Relation Age of Onset     Hypertension Mother      Myocardial Infarction Mother      Heart Disease Father      Diabetes Brother      Heart Disease Brother        Social History:  Presents to the emergency department alone    Physical Exam     Patient Vitals for the past 24 hrs:   BP Temp Temp src Pulse Resp SpO2 Height Weight   12/12/21 1200 -- -- -- -- -- 94 % -- --   12/12/21 1100 -- -- -- -- -- 97 % -- --   12/12/21 1030 (!) 179/83 -- -- -- -- 98 % -- --   12/12/21 0952 (!) 167/69 98.9  F (37.2  C) Temporal 88 20 97 % 1.575 m (5' 2\") 66.2 kg (146 lb)     Physical Exam  General: Patient is alert, awake and interactive when I enter the room  Head: The scalp, face, and head appear normal  Eyes: Conjunctivae are normal  ENT: Ongoing bleeding from the left nare, unclear source at this time.  The nose is normal, Pinnae are normal, External acoustic canals are normal  Neck: Trachea midline  CV: Pulses are normal.   Resp: No respiratory distress   Musc: Normal muscular tone, moving all extremities.  Skin: No rash or lesions noted.  No other evidence of ecchymosis  Neuro: Speech is normal and fluent. Face is symmetric.   Psych: Normal affect.  Appropriate interactions.    Emergency Department Course     Laboratory:  Labs Ordered and Resulted from Time of ED Arrival to Time of ED Departure   CBC WITH " PLATELETS AND DIFFERENTIAL       Result Value    WBC Count 4.9      RBC Count 5.10      Hemoglobin 14.3      Hematocrit 44.9      MCV 88      MCH 28.0      MCHC 31.8      RDW 13.2      Platelet Count 257      % Neutrophils 67      % Lymphocytes 24      % Monocytes 7      % Eosinophils 2      % Basophils 0      % Immature Granulocytes 0      NRBCs per 100 WBC 0      Absolute Neutrophils 3.2      Absolute Lymphocytes 1.2      Absolute Monocytes 0.4      Absolute Eosinophils 0.1      Absolute Basophils 0.0      Absolute Immature Granulocytes 0.0      Absolute NRBCs 0.0         Emergency Department Course:      Epistaxis management     PHYSICIAN: Dougie Rivera MD     INDICATION:  Epistaxis    ANESTHESIA: 4% lidocaine aerosolized    TECHNIQUE: Lidocaine and Afrin were instilled in bilateral nares.  Nasal clamp was applied for approximately 20 minutes and then removed.  Bleeding had significantly improved.  There is a small area along the right wall of the right nare that required silver nitrate cautery with achievement of hemostasis.      Reviewed:  I reviewed nursing notes, vitals and past history    Disposition:  The patient was discharged to home.    Impression & Plan      Medical Decision Making:  Bernard Dodge is a 66 year old female who presents for evaluation of nasal bleeding and epistaxis.  The bleeding was controlled with interventions in the ED and therefore supportive outpatient management is indicated.  Close follow-up with ENT and primary care; see discharge instructions. There are no signs of coagulopathy causing the bleeding or a general medical condition (thrombocytopenia, DIC, leukemia, etc) causing the bleeding today.  Patient is Covid positive but there is no signs of respiratory distress or indication for further work-up at this time.    Diagnosis:    ICD-10-CM    1. Epistaxis  R04.0    2. COVID-19  U07.1        MD Nicole Abdi Christopher Joseph, MD  12/12/21  1526       Dougie Rivera MD  01/01/22 1126

## 2022-02-18 ENCOUNTER — IMMUNIZATION (OUTPATIENT)
Dept: NURSING | Facility: CLINIC | Age: 67
End: 2022-02-18
Payer: MEDICARE

## 2022-02-18 PROCEDURE — 91305 COVID-19,PF,PFIZER (12+ YRS): CPT

## 2022-02-18 PROCEDURE — 0054A COVID-19,PF,PFIZER (12+ YRS): CPT

## 2022-03-08 ENCOUNTER — TELEPHONE (OUTPATIENT)
Dept: CARDIOLOGY | Facility: CLINIC | Age: 67
End: 2022-03-08
Payer: MEDICARE

## 2022-03-08 NOTE — TELEPHONE ENCOUNTER
The likelihood of a major cardiac event of aspirin would be low. This should be restarted ASAP post scope. Thx

## 2022-03-08 NOTE — TELEPHONE ENCOUNTER
[facility] Call Center    Phone Message    May a detailed message be left on voicemail: no     Reason for Call: Other: Bernard called to advise Dr Heredia she is scheduled for a colonoscopy on 3/15/2022, and they have requested she hold her aspirin beginning today 3/8/2022. Please reach out to Bernard at [phone].     Action Taken: Other: [facility]    Travel Screening: Not Applicable

## 2022-03-09 ENCOUNTER — LAB (OUTPATIENT)
Dept: LAB | Facility: CLINIC | Age: 67
End: 2022-03-09
Payer: MEDICARE

## 2022-03-09 ENCOUNTER — HOSPITAL ENCOUNTER (OUTPATIENT)
Dept: CARDIOLOGY | Facility: CLINIC | Age: 67
Discharge: HOME | End: 2022-03-09
Attending: PHYSICAL MEDICINE & REHABILITATION | Admitting: PHYSICAL MEDICINE & REHABILITATION
Payer: MEDICARE

## 2022-03-09 DIAGNOSIS — I25.5 ISCHEMIC CARDIOMYOPATHY: ICD-10-CM

## 2022-03-09 DIAGNOSIS — I10 ESSENTIAL HYPERTENSION: ICD-10-CM

## 2022-03-09 DIAGNOSIS — I25.10 CORONARY ARTERY DISEASE INVOLVING NATIVE CORONARY ARTERY OF NATIVE HEART WITHOUT ANGINA PECTORIS: ICD-10-CM

## 2022-03-09 LAB
ALT SERPL W P-5'-P-CCNC: 45 U/L (ref 0–50)
ANION GAP SERPL CALCULATED.3IONS-SCNC: 2 MMOL/L (ref 3–14)
BUN SERPL-MCNC: 29 MG/DL (ref 7–30)
CALCIUM SERPL-MCNC: 9.3 MG/DL (ref 8.5–10.1)
CHLORIDE BLD-SCNC: 106 MMOL/L (ref 94–109)
CHOLEST SERPL-MCNC: 109 MG/DL
CO2 SERPL-SCNC: 30 MMOL/L (ref 20–32)
CREAT SERPL-MCNC: 0.76 MG/DL (ref 0.52–1.04)
FASTING STATUS PATIENT QL REPORTED: YES
GFR SERPL CREATININE-BSD FRML MDRD: 86 ML/MIN/1.73M2
GLUCOSE BLD-MCNC: 97 MG/DL (ref 70–99)
HDLC SERPL-MCNC: 57 MG/DL
LDLC SERPL CALC-MCNC: 35 MG/DL
LVEF ECHO: NORMAL
NONHDLC SERPL-MCNC: 52 MG/DL
POTASSIUM BLD-SCNC: 4 MMOL/L (ref 3.4–5.3)
SODIUM SERPL-SCNC: 138 MMOL/L (ref 133–144)
TRIGL SERPL-MCNC: 85 MG/DL

## 2022-03-09 PROCEDURE — 93306 TTE W/DOPPLER COMPLETE: CPT | Mod: 26 | Performed by: INTERNAL MEDICINE

## 2022-03-09 PROCEDURE — 36415 COLL VENOUS BLD VENIPUNCTURE: CPT | Performed by: INTERNAL MEDICINE

## 2022-03-09 PROCEDURE — 84460 ALANINE AMINO (ALT) (SGPT): CPT | Performed by: INTERNAL MEDICINE

## 2022-03-09 PROCEDURE — 80061 LIPID PANEL: CPT | Performed by: INTERNAL MEDICINE

## 2022-03-09 PROCEDURE — 80048 BASIC METABOLIC PNL TOTAL CA: CPT | Performed by: INTERNAL MEDICINE

## 2022-03-09 PROCEDURE — 93306 TTE W/DOPPLER COMPLETE: CPT

## 2022-03-28 ENCOUNTER — HOSPITAL ENCOUNTER (EMERGENCY)
Facility: CLINIC | Age: 67
Discharge: HOME | End: 2022-03-28
Attending: EMERGENCY MEDICINE | Admitting: EMERGENCY MEDICINE
Payer: MEDICARE

## 2022-03-28 ENCOUNTER — APPOINTMENT (OUTPATIENT)
Dept: GENERAL RADIOLOGY | Facility: CLINIC | Age: 67
End: 2022-03-28
Attending: EMERGENCY MEDICINE
Payer: MEDICARE

## 2022-03-28 VITALS
HEART RATE: 93 BPM | RESPIRATION RATE: 18 BRPM | OXYGEN SATURATION: 98 % | TEMPERATURE: 98 F | DIASTOLIC BLOOD PRESSURE: 61 MMHG | SYSTOLIC BLOOD PRESSURE: 138 MMHG

## 2022-03-28 DIAGNOSIS — M54.6 ACUTE RIGHT-SIDED THORACIC BACK PAIN: ICD-10-CM

## 2022-03-28 LAB
ALBUMIN SERPL-MCNC: 3.2 G/DL (ref 3.4–5)
ALP SERPL-CCNC: 80 U/L (ref 40–150)
ALT SERPL W P-5'-P-CCNC: 31 U/L (ref 0–50)
ANION GAP SERPL CALCULATED.3IONS-SCNC: 4 MMOL/L (ref 3–14)
AST SERPL W P-5'-P-CCNC: 24 U/L (ref 0–45)
BASOPHILS # BLD AUTO: 0.1 10E3/UL (ref 0–0.2)
BASOPHILS NFR BLD AUTO: 1 %
BILIRUB SERPL-MCNC: 0.6 MG/DL (ref 0.2–1.3)
BUN SERPL-MCNC: 15 MG/DL (ref 7–30)
CALCIUM SERPL-MCNC: 9.1 MG/DL (ref 8.5–10.1)
CHLORIDE BLD-SCNC: 107 MMOL/L (ref 94–109)
CO2 SERPL-SCNC: 28 MMOL/L (ref 20–32)
CREAT SERPL-MCNC: 0.8 MG/DL (ref 0.52–1.04)
D DIMER PPP FEU-MCNC: 0.59 UG/ML FEU (ref 0–0.5)
EOSINOPHIL # BLD AUTO: 0.3 10E3/UL (ref 0–0.7)
EOSINOPHIL NFR BLD AUTO: 3 %
ERYTHROCYTE [DISTWIDTH] IN BLOOD BY AUTOMATED COUNT: 13.4 % (ref 10–15)
GFR SERPL CREATININE-BSD FRML MDRD: 81 ML/MIN/1.73M2
GLUCOSE BLD-MCNC: 169 MG/DL (ref 70–99)
HCT VFR BLD AUTO: 42.3 % (ref 35–47)
HGB BLD-MCNC: 13.4 G/DL (ref 11.7–15.7)
IMM GRANULOCYTES # BLD: 0 10E3/UL
IMM GRANULOCYTES NFR BLD: 0 %
LYMPHOCYTES # BLD AUTO: 1.7 10E3/UL (ref 0.8–5.3)
LYMPHOCYTES NFR BLD AUTO: 20 %
MCH RBC QN AUTO: 28.3 PG (ref 26.5–33)
MCHC RBC AUTO-ENTMCNC: 31.7 G/DL (ref 31.5–36.5)
MCV RBC AUTO: 89 FL (ref 78–100)
MONOCYTES # BLD AUTO: 0.8 10E3/UL (ref 0–1.3)
MONOCYTES NFR BLD AUTO: 9 %
NEUTROPHILS # BLD AUTO: 5.8 10E3/UL (ref 1.6–8.3)
NEUTROPHILS NFR BLD AUTO: 67 %
NRBC # BLD AUTO: 0 10E3/UL
NRBC BLD AUTO-RTO: 0 /100
PLATELET # BLD AUTO: 256 10E3/UL (ref 150–450)
POTASSIUM BLD-SCNC: 3.7 MMOL/L (ref 3.4–5.3)
PROT SERPL-MCNC: 7.4 G/DL (ref 6.8–8.8)
RBC # BLD AUTO: 4.73 10E6/UL (ref 3.8–5.2)
SODIUM SERPL-SCNC: 139 MMOL/L (ref 133–144)
TROPONIN I SERPL HS-MCNC: 4 NG/L
WBC # BLD AUTO: 8.7 10E3/UL (ref 4–11)

## 2022-03-28 PROCEDURE — 96375 TX/PRO/DX INJ NEW DRUG ADDON: CPT

## 2022-03-28 PROCEDURE — 93005 ELECTROCARDIOGRAM TRACING: CPT

## 2022-03-28 PROCEDURE — 99285 EMERGENCY DEPT VISIT HI MDM: CPT | Mod: 25

## 2022-03-28 PROCEDURE — 71046 X-RAY EXAM CHEST 2 VIEWS: CPT

## 2022-03-28 PROCEDURE — 36415 COLL VENOUS BLD VENIPUNCTURE: CPT | Performed by: EMERGENCY MEDICINE

## 2022-03-28 PROCEDURE — 85379 FIBRIN DEGRADATION QUANT: CPT | Performed by: EMERGENCY MEDICINE

## 2022-03-28 PROCEDURE — 96374 THER/PROPH/DIAG INJ IV PUSH: CPT

## 2022-03-28 PROCEDURE — 250N000013 HC RX MED GY IP 250 OP 250 PS 637: Performed by: EMERGENCY MEDICINE

## 2022-03-28 PROCEDURE — 250N000011 HC RX IP 250 OP 636: Performed by: EMERGENCY MEDICINE

## 2022-03-28 PROCEDURE — 85025 COMPLETE CBC W/AUTO DIFF WBC: CPT | Performed by: EMERGENCY MEDICINE

## 2022-03-28 PROCEDURE — 84484 ASSAY OF TROPONIN QUANT: CPT | Performed by: EMERGENCY MEDICINE

## 2022-03-28 PROCEDURE — 80053 COMPREHEN METABOLIC PANEL: CPT | Performed by: EMERGENCY MEDICINE

## 2022-03-28 RX ORDER — KETOROLAC TROMETHAMINE 15 MG/ML
15 INJECTION, SOLUTION INTRAMUSCULAR; INTRAVENOUS ONCE
Status: COMPLETED | OUTPATIENT
Start: 2022-03-28 | End: 2022-03-28

## 2022-03-28 RX ORDER — HYDROCODONE BITARTRATE AND ACETAMINOPHEN 5; 325 MG/1; MG/1
TABLET ORAL
Qty: 8 TABLET | Refills: 0 | Status: SHIPPED | OUTPATIENT
Start: 2022-03-28 | End: 2023-10-06

## 2022-03-28 RX ORDER — LIDOCAINE 4 G/G
1 PATCH TOPICAL ONCE
Status: DISCONTINUED | OUTPATIENT
Start: 2022-03-28 | End: 2022-03-28 | Stop reason: HOSPADM

## 2022-03-28 RX ORDER — HYDROMORPHONE HYDROCHLORIDE 1 MG/ML
0.5 INJECTION, SOLUTION INTRAMUSCULAR; INTRAVENOUS; SUBCUTANEOUS ONCE
Status: COMPLETED | OUTPATIENT
Start: 2022-03-28 | End: 2022-03-28

## 2022-03-28 RX ORDER — CYCLOBENZAPRINE HCL 10 MG
10 TABLET ORAL 3 TIMES DAILY PRN
Qty: 18 TABLET | Refills: 0 | Status: SHIPPED | OUTPATIENT
Start: 2022-03-28 | End: 2022-04-03

## 2022-03-28 RX ORDER — LIDOCAINE 50 MG/G
1 PATCH TOPICAL EVERY 24 HOURS
Qty: 10 PATCH | Refills: 0 | Status: SHIPPED | OUTPATIENT
Start: 2022-03-28 | End: 2022-04-07

## 2022-03-28 RX ADMIN — HYDROMORPHONE HYDROCHLORIDE 0.5 MG: 1 INJECTION, SOLUTION INTRAMUSCULAR; INTRAVENOUS; SUBCUTANEOUS at 19:27

## 2022-03-28 RX ADMIN — LIDOCAINE 1 PATCH: 246 PATCH TOPICAL at 20:27

## 2022-03-28 RX ADMIN — KETOROLAC TROMETHAMINE 15 MG: 15 INJECTION, SOLUTION INTRAMUSCULAR; INTRAVENOUS at 19:28

## 2022-03-28 ASSESSMENT — ENCOUNTER SYMPTOMS
FEVER: 0
NUMBNESS: 0
SHORTNESS OF BREATH: 0
MYALGIAS: 0
NAUSEA: 0
VOMITING: 0
BACK PAIN: 1
WEAKNESS: 0

## 2022-03-28 NOTE — ED TRIAGE NOTES
Pt reports that she has been having upper middle/right back pain. PT reports that it is worse with postion changes. PT reports that the last time she had this pain she was having a heart attack. PT denies CP at this time. PT denies changes in urination at this time, no loss of bowel or bladder. VSS

## 2022-03-28 NOTE — ED PROVIDER NOTES
"  History   Chief Complaint:  Back Pain       The history is provided by the patient.      Bernard Dodge is a 66 year old female with history of STEMI, ischemic cardiomyopathy, CAD, CHF, type 2 diabetes, alcohol abuse, tobacco use, NAFLD, hypertension, and hyperlipidemia who presents with back pain. Bernard has been experiencing mid-right back pain and spasms beginning in the middle of the night. Her pain was so great that it woke her up and prevented her from sleeping. The pain is described as a dull ache and is exacerbated with movement. She also previously experienced right shoulder pain this past week that resolved on its own. She took 10 mg of Flexeril today around 1330, about 4.5 hours ago, which did not help. She also saw a chiropractor today, but they were unable to do much because she was in too much pain. She has a history of STEMI but notes that today's symptoms are dissimilar from those experienced with her STEMI. She denies gait issues, numbness, weakness, or pain in her legs, or loss of control. She also denies fever, nausea, vomiting, or shortness of breath. She is not allergic to pain medications but notes that percocet makes her \"funny in the head.\"    Review of Systems   Constitutional: Negative for fever.   Respiratory: Negative for shortness of breath.    Gastrointestinal: Negative for nausea and vomiting.   Musculoskeletal: Positive for back pain (R). Negative for gait problem and myalgias (legs).   Neurological: Negative for weakness (legs) and numbness (legs).   All other systems reviewed and are negative.      Allergies:  Cellcept [Mycophenolate]  Levaquin [Levofloxacin]  Lisinopril    Medications:  Aspirin 81 mg  Lipitor  Coreg  Cozaar  Nitrostat    Past Medical History:     Hypertension  BOOP (bronchiolitis obliterans with organizing pneumoni)  Breast cancer  CAD   Hyperlipidemia  Tobacco use  NAFLD  Fracture of left ankle   Ischemic cardiomyopathy  Sleep apnea   STEMI  Colon " polyp  CHF  Alcohol abuse  Adenoma of large intestine  Obstructive sleep apnea  Type 2 diabetes      Past Surgical History:    Heart catheterization and angioplasty  Coronary stent placement  Breast lumpectomy, left     Family History:    Mother: hypertension, MI  Father: heart disease  Brother: unspecified diabetes, heart disease    Social History:  The patient presents to the ED with her , Omar.  She and her  are going on vacation to [place] in 20 days.    Physical Exam     Patient Vitals for the past 24 hrs:   BP Temp Temp src Pulse Resp SpO2   03/28/22 2120 138/61 -- -- -- -- 98 %   03/28/22 2100 -- -- -- -- -- 97 %   03/28/22 2030 -- -- -- -- -- 99 %   03/28/22 2000 -- -- -- -- -- 98 %   03/28/22 1737 (!) 186/95 98  F (36.7  C) Temporal 93 18 100 %       Physical Exam  Constitutional: Well appearing.  HEENT: Atraumatic.  Moist mucous membranes.  Neck: Soft.  Supple.  No JVD.  Cardiac: Regular rate and rhythm.  No murmur or rub.  Respiratory: Clear to auscultation bilaterally.  No respiratory distress.  No wheezing, rhonchi, or rales.  Abdomen: Soft and nontender. Nondistended.  Musculoskeletal: There is tenderness to the right thoracic paraspinal muscle area in the mid thoracic back.  No tenderness of the midline spine.  No bruises, rashes, or swelling noted.  No edema.  Normal range of motion.  Neurologic: Alert and oriented x3.  Normal tone and bulk.   5/5 strength in bilateral upper and lower extremities.  Sensation to light touch intact throughout.   Skin: No rashes.  No edema.  Psych: Normal affect.  Normal behavior.    Emergency Department Course     ECG  ECG obtained at 1816, ECG read at 1828  Normal sinus rhythm. Cannot rule out anterior infarct, age undetermined.   No significant change as compared to prior, dated 8/23/16 (T wave abnormality no longer present).  Rate 82 bpm. NV interval 172 ms. QRS duration 70 ms. QT/QTc 336/392 ms. P-R-T axes 47 3 56.     Imaging:  XR Chest 2 Views    Final Result   IMPRESSION: 2 views of the chest show no acute cardiopulmonary abnormalities. There is borderline hyperinflation of both lungs. Partially calcified thoracic aorta. Since comparative radiograph the infiltrates have resolved.        Report per radiology    Laboratory:  Labs Ordered and Resulted from Time of ED Arrival to Time of ED Departure   COMPREHENSIVE METABOLIC PANEL - Abnormal       Result Value    Sodium 139      Potassium 3.7      Chloride 107      Carbon Dioxide (CO2) 28      Anion Gap 4      Urea Nitrogen 15      Creatinine 0.80      Calcium 9.1      Glucose 169 (*)     Alkaline Phosphatase 80      AST 24      ALT 31      Protein Total 7.4      Albumin 3.2 (*)     Bilirubin Total 0.6      GFR Estimate 81     D DIMER QUANTITATIVE - Abnormal    D-Dimer Quantitative 0.59 (*)    TROPONIN I - Normal    Troponin I High Sensitivity 4     CBC WITH PLATELETS AND DIFFERENTIAL    WBC Count 8.7      RBC Count 4.73      Hemoglobin 13.4      Hematocrit 42.3      MCV 89      MCH 28.3      MCHC 31.7      RDW 13.4      Platelet Count 256      % Neutrophils 67      % Lymphocytes 20      % Monocytes 9      % Eosinophils 3      % Basophils 1      % Immature Granulocytes 0      NRBCs per 100 WBC 0      Absolute Neutrophils 5.8      Absolute Lymphocytes 1.7      Absolute Monocytes 0.8      Absolute Eosinophils 0.3      Absolute Basophils 0.1      Absolute Immature Granulocytes 0.0      Absolute NRBCs 0.0        Emergency Department Course:             Reviewed:  I reviewed nursing notes, vitals, past medical history and Care Everywhere    Assessments:  1817 I obtained history and examined the patient as noted above.   2117 I rechecked the patient and discussed discharge.    Interventions:  1927 Dilaudid 0.5 mg IV  1928 Toradol 15 mg IV  2027 Lidocare patch transdermal    Disposition:  The patient was discharged to home.     Impression & Plan     Medical Decision Making:  Bernard Dodge is a 66-year-old woman  who is afebrile and hemodynamically stable.  She has pain in the right mid upper thoracic back just to the right of midline in the paraspinal muscle area.  She has no tenderness over the midline spine.  Her EKG demonstrates a normal sinus rhythm with no acute ischemic changes.  She has a history of STEMI with back pain on the left, however, the symptoms are not consistent with her previous symptoms that led to diagnosis of STEMI.  She is neurologically intact.  X-ray demonstrates no acute abnormality within the chest.  Lab work-up is noted as above and is grossly unrevealing including a D-dimer that is age-adjusted within normal limits and she has no typical PE symptoms or risk factors.  I think this safely excludes a PE.  She was given the above intervention with good improvement.  Discussed likely muscle strain and plan for home with supportive care and close primary care follow-up.  She was counseled on the use of opiate pain medication.  She also use Lidoderm and muscle relaxer.  We discussed supportive care at home and strict return precautions were given.  Her questions were answered and she was in no distress at time of discharge.    Diagnosis:    ICD-10-CM    1. Acute right-sided thoracic back pain  M54.6        Discharge Medications:  New Prescriptions    CYCLOBENZAPRINE (FLEXERIL) 10 MG TABLET    Take 1 tablet (10 mg) by mouth 3 times daily as needed for muscle spasms    HYDROCODONE-ACETAMINOPHEN (NORCO) 5-325 MG TABLET    Take 1 tablet by mouth every 6 hours as needed for pain    LIDOCAINE (LIDODERM) 5 % PATCH    Place 1 patch onto the skin every 24 hours for 10 days       Scribe Disclosure:  ISolo, am serving as a scribe at 6:06 PM on 3/28/2022 to document services personally performed by Kel Valdes MD based on my observations and the provider's statements to me.         Kel Valdes MD  03/29/22 0043

## 2022-03-29 LAB
ATRIAL RATE - MUSE: 82 BPM
DIASTOLIC BLOOD PRESSURE - MUSE: NORMAL MMHG
INTERPRETATION ECG - MUSE: NORMAL
P AXIS - MUSE: 47 DEGREES
PR INTERVAL - MUSE: 172 MS
QRS DURATION - MUSE: 70 MS
QT - MUSE: 336 MS
QTC - MUSE: 392 MS
R AXIS - MUSE: 3 DEGREES
SYSTOLIC BLOOD PRESSURE - MUSE: NORMAL MMHG
T AXIS - MUSE: 56 DEGREES
VENTRICULAR RATE- MUSE: 82 BPM

## 2022-03-29 NOTE — DISCHARGE INSTRUCTIONS
Discharge Instructions  Back Pain  You were seen today for back pain. Back pain can have many causes, but most will get better without surgery or other specific treatment. Sometimes there is a herniated ( slipped ) disc. We do not usually do MRI scans to look for these right away, since most herniated discs will get better on their own with time.  Today, we did not find any evidence that your back pain was caused by a serious condition. However, sometimes symptoms develop over time and cannot be found during an emergency visit, so it is very important that you follow up with your primary provider.  Generally, every Emergency Department visit should have a follow-up clinic visit with either a primary or a specialty clinic/provider. Please follow-up as instructed by your emergency provider today.    Return to the Emergency Department if:  You develop a fever with your back pain.   You have weakness or change in sensation in one or both legs.  You lose control of your bowels or bladder, or cannot empty your bladder (cannot pee).  Your pain gets much worse.     Follow-up with your provider:  Unless your pain has completely gone away, please make an appointment with your provider within one week. Most of the routine care for back pain is available in a clinic and not the Emergency Department. You may need further management of your back pain, such as more pain medication, imaging such as an X-ray or MRI, or physical therapy.    What can I do to help myself?  Remain Active -- People are often afraid that they will hurt their back further or delay recovery by remaining active, but this is one of the best things you can do for your back. In fact, staying in bed for a long time to rest is not recommended. Studies have shown that people with low back pain recover faster when they remain active. Movement helps to bring blood flow to the muscles and relieve muscle spasms as well as preventing loss of muscle strength.  Heat --  Using a heating pad can help with low back pain during the first few weeks. Do not sleep with a heating pad, as you can be burned.   Pain medications - You may take a pain medication such as Tylenol  (acetaminophen), Advil , Motrin  (ibuprofen) or Aleve  (naproxen).  If you were given a prescription for medicine here today, be sure to read all of the information (including the package insert) that comes with your prescription.  This will include important information about the medicine, its side effects, and any warnings that you need to know about.  The pharmacist who fills the prescription can provide more information and answer questions you may have about the medicine.  If you have questions or concerns that the pharmacist cannot address, please call or return to the Emergency Department.   Remember that you can always come back to the Emergency Department if you are not able to see your regular provider in the amount of time listed above, if you get any new symptoms, or if there is anything that worries you.    Opioid Medication Information    You have been given a prescription for an opioid (narcotic) pain medicine and/or have received a pain medicine while here in the Emergency Department. These medicines can make you drowsy or impaired. You must not drive, operate dangerous equipment, or engage in any other dangerous activities while taking these medications. If you drive while taking these medications, you could be arrested for driving under the influence (DUI). Do not drink any alcohol while you are taking these medications.     Opioid pain medications can cause addiction. If you have a history of chemical dependency of any type, you are at a higher risk of becoming addicted to pain medications.  Only take these prescribed medications to treat your pain when all other options have been tried. Take it for as short a time and as few doses as possible. Store your pain pills in a secure place, as they are  frequently stolen and provide a dangerous opportunity for children or visitors in your house to start abusing these powerful medications. We will not replace any lost or stolen medicine.    If you do not finish your medication, it is a good idea to get rid of it but please do not flush it down the toilet. Please dispose of the remaining medication at a local pharmacy or law enforcement facility. The [place] [employer] has additional information on medication disposal: https://www.pca.[facility].us/living-green/managing-unwanted-medications.      Many prescription pain medications contain Tylenol  (acetaminophen), including Vicodin , Tylenol #3 , Norco , Lortab , and Percocet .  You should not take any extra pills of Tylenol  if you are using these prescription medications or you can get very sick.  Do not ever take more than 3000 mg of acetaminophen in any 24 hour period.    All opioids tend to cause constipation. Drink plenty of water and eat foods that have a lot of fiber, such as fruits, vegetables, prune juice, apple juice and high fiber cereal.  Take a laxative if you don t move your bowels at least every other day. Miralax , Milk of Magnesia, Colace , or Senna  can be used to keep you regular.

## 2022-06-11 ENCOUNTER — HEALTH MAINTENANCE LETTER (OUTPATIENT)
Age: 67
End: 2022-06-11

## 2022-06-24 ENCOUNTER — OFFICE VISIT (OUTPATIENT)
Dept: CARDIOLOGY | Facility: CLINIC | Age: 67
End: 2022-06-24
Attending: INTERNAL MEDICINE
Payer: MEDICARE

## 2022-06-24 VITALS
HEART RATE: 76 BPM | WEIGHT: 159.2 LBS | OXYGEN SATURATION: 95 % | HEIGHT: 62 IN | BODY MASS INDEX: 29.3 KG/M2 | DIASTOLIC BLOOD PRESSURE: 62 MMHG | SYSTOLIC BLOOD PRESSURE: 138 MMHG

## 2022-06-24 DIAGNOSIS — I10 ESSENTIAL HYPERTENSION: ICD-10-CM

## 2022-06-24 DIAGNOSIS — I25.10 CORONARY ARTERY DISEASE INVOLVING NATIVE CORONARY ARTERY OF NATIVE HEART WITHOUT ANGINA PECTORIS: ICD-10-CM

## 2022-06-24 DIAGNOSIS — I25.2 OLD MYOCARDIAL INFARCTION: ICD-10-CM

## 2022-06-24 DIAGNOSIS — I25.5 ISCHEMIC CARDIOMYOPATHY: ICD-10-CM

## 2022-06-24 PROCEDURE — 99214 OFFICE O/P EST MOD 30 MIN: CPT | Performed by: INTERNAL MEDICINE

## 2022-06-24 RX ORDER — MULTIVIT-MIN/IRON/FOLIC ACID/K 18-600-40
1 CAPSULE ORAL DAILY
COMMUNITY
End: 2024-07-15

## 2022-06-24 RX ORDER — MULTIVIT WITH MINERALS/LUTEIN
1000 TABLET ORAL DAILY
COMMUNITY

## 2022-06-24 RX ORDER — GLUCOSAMINE/D3/BOSWELLIA SERRA 1500MG-400
1 TABLET ORAL DAILY
COMMUNITY

## 2022-06-24 RX ORDER — MAGNESIUM OXIDE 400 MG/1
400 TABLET ORAL AT BEDTIME
COMMUNITY

## 2022-06-24 NOTE — LETTER
6/24/2022    Kumar Webber MD  [address] [place] [address]    RE: Bernard Dodge       Dear Colleague,     I had the pleasure of seeing Bernard Dodge in the [facility].  HPI and Plan:   See dictation          Orders Placed This Encounter   Procedures     Basic metabolic panel     Lipid Profile     ALT     Follow-Up with Cardiology       Orders Placed This Encounter   Medications     Biotin 68622 MCG TABS     Sig: Take 1 tablet by mouth daily     vitamin C (ASCORBIC ACID) 1000 MG TABS     Sig: Take 1,000 mg by mouth daily     Cholecalciferol (VITAMIN D) 50 MCG (2000 UT) CAPS     Sig: Take 1 tablet by mouth daily     magnesium oxide (MAGNESIUM OXIDE) 400 MG tablet     Sig: Take 400 mg by mouth daily       There are no discontinued medications.      Encounter Diagnoses   Name Primary?     Coronary artery disease involving native coronary artery of native heart without angina pectoris      Essential hypertension      Ischemic cardiomyopathy      Old myocardial infarction        CURRENT MEDICATIONS:  Current Outpatient Medications   Medication Sig Dispense Refill     aspirin 81 MG tablet Take 1 tablet (81 mg) by mouth daily 30 tablet      atorvastatin (LIPITOR) 80 MG tablet Take 1 tablet (80 mg) by mouth daily 30 tablet 11     Biotin 08112 MCG TABS Take 1 tablet by mouth daily       carvedilol (COREG) 3.125 MG tablet Take 1 tablet (3.125 mg) by mouth 2 times daily 60 tablet 11     Cholecalciferol (VITAMIN D) 50 MCG (2000 UT) CAPS Take 1 tablet by mouth daily       HYDROcodone-acetaminophen (NORCO) 5-325 MG tablet Take 1 tablet by mouth every 6 hours as needed for pain 8 tablet 0     losartan (COZAAR) 25 MG tablet Take 1 tablet (25 mg) by mouth daily 90 tablet 3     magnesium oxide (MAGNESIUM OXIDE) 400 MG tablet Take 400 mg by mouth daily       nitroGLYcerin (NITROSTAT) 0.4 MG sublingual tablet Place 1 tablet (0.4 mg) under the tongue every 5 minutes as needed for chest pain if you are  still having symptoms after 3 doses (15 minutes) call 911. 22 tablet 2     Probiotic Product (PROBIOTIC PO) Take 1 capsule by mouth daily       vitamin C (ASCORBIC ACID) 1000 MG TABS Take 1,000 mg by mouth daily         ALLERGIES     Allergies   Allergen Reactions     Cellcept [Mycophenolate]      Levaquin [Levofloxacin]      Lisinopril Cough       PAST MEDICAL HISTORY:  Past Medical History:   Diagnosis Date     Benign essential hypertension      BOOP (bronchiolitis obliterans with organizing pneumonia) (H)     post radiation     Breast CA (H)     left side     CAD (coronary artery disease), native coronary artery     STEMI 2016  PCI mid LAD 16     DM (diabetes mellitus screen)      Dyslipidemia      Ex-smoker      Fatty liver disease, nonalcoholic      Fracture of left ankle      Ischemic cardiomyopathy      Sleep apnea     Cpap       PAST SURGICAL HISTORY:  Past Surgical History:   Procedure Laterality Date     HEART CATH, ANGIOPLASTY       STENT, CORONARY, S660 15/18  2016    MABEL=LAD       FAMILY HISTORY:  Family History   Problem Relation Age of Onset     Hypertension Mother      Myocardial Infarction Mother      Heart Disease Father      Diabetes Brother      Heart Disease Brother        SOCIAL HISTORY:  Social History     Socioeconomic History     Marital status:    Tobacco Use     Smoking status: Former Smoker     Packs/day: 1.00     Years: 25.00     Pack years: 25.00     Types: Cigarettes     Quit date:      Years since quittin.4     Smokeless tobacco: Never Used   Substance and Sexual Activity     Alcohol use: Yes     Alcohol/week: 0.0 standard drinks     Comment: a few glasses of wine weekly   Other Topics Concern     Parent/sibling w/ CABG, MI or angioplasty before 65F 55M? Yes     Caffeine Concern Yes     Comment: 1-2  cup daily     Special Diet Yes     Comment: low NA     Exercise Yes     Comment: heart rehab, walk       Review of Systems:  Skin:          Eyes:         ENT:  "        Respiratory:  Positive for sleep apnea     Cardiovascular:  Negative      Gastroenterology:        Genitourinary:         Musculoskeletal:  Positive for back pain upper back pain left side  Neurologic:         Psychiatric:         Heme/Lymph/Imm:         Endocrine:           Physical Exam:  Vitals: /62 (BP Location: Right arm, Patient Position: Sitting, Cuff Size: Adult Regular)   Pulse 76   Ht 1.575 m (5' 2\")   Wt 72.2 kg (159 lb 3.2 oz)   LMP  (LMP Unknown)   SpO2 95%   BMI 29.12 kg/m      Constitutional:  cooperative, alert and oriented, well developed, well nourished, in no acute distress overweight      Skin:  warm and dry to the touch, no apparent skin lesions or masses noted          Head:  normocephalic, no masses or lesions        Eyes:  pupils equal and round;conjunctivae and lids unremarkable;sclera white        Lymph:      ENT:  no pallor or cyanosis        Neck:  JVP normal;no carotid bruit        Respiratory:  normal breath sounds, clear to auscultation, normal A-P diameter, normal symmetry, normal respiratory excursion, no use of accessory muscles         Cardiac: normal S1 and S2;regular rhythm;apical impulse not displaced   S4 no presence of murmur          pulses full and equal, no bruits auscultated                                   left radial site intact without hum or bruit    GI:  not assessed this visit        Extremities and Muscular Skeletal:  no deformities, clubbing, cyanosis, erythema observed;no edema              Neurological:  no gross motor deficits;affect appropriate        Psych:  Alert and Oriented x 3        CC  Tim Heredia MD  [address]  [place] [address]                Service Date: 06/24/2022    REFERRING PHYSICIAN:  Dr. Kumar Webber.    HISTORY OF PRESENT ILLNESS:  It is my pleasure to see your patient, Bernard Dodge, who is a 66-year-old patient who suffered an acute ST elevation anterior myocardial infarct and underwent emergency " stenting of the left anterior descending artery.  Immediately post myocardial infarct, her ejection fraction was in the 40%-45% range, but on medical therapy and after revascularization her ejection fraction returned to a normal EF of 55%-60%.    With that background in mind, the patient is doing well.  She has no chest pains, no chest pressure, no unusual shortness of breath.  Most recent echocardiogram was performed on the 9th of March of this year, which showed again normal EF of 55%-60%, no significant valvular heart disease noted.  Her mother has a dilated ascending aorta, but her ascending aorta is normal, measuring 2.8 cm, which is well within the normal range.  The aortic root is also normal at 3.0 cm.  Blood pressure is well controlled at 138/62 today, and her pulse is 76 beats per minute.  Her lipid profile in March was excellent with an LDL of 35, HDL of 57 and triglycerides of 85 with a total cholesterol of 109.  This patient is on losartan appropriately post myocardial infarct, and we do check a basic metabolic profile every year, and this again was normal on 03/28 with a potassium of 3.7, a BUN of 15, creatinine 0.8 and a GFR of 81.  Her liver function tests are also normal with an ALT of 31.    IMPRESSION:    1.  Status post anterior myocardial infarct.  The patient is asymptomatic with respect to coronary artery disease, and her ejection fraction returned to normal post revascularization and on medical therapy.  2.  Normotensive.  3.  Excellent lipid profile, well within secondary prevention guidelines.  4.  Impressive lifestyle changes with a significant drop in weight from 192 pounds in 2019, down to 159 pounds today.  This has resulted in a marked improvement in lipids and blood pressure.    PLAN:  We will continue the patient on her good present medications.  The patient will continue her excellent lifestyle changes.  We will see her back again in 1 year's time and repeat her lipids and basic  metabolic profile at that stage.  At this time, we do not need to repeat her echocardiogram as her ejection fraction has returned to normal.    It has been my pleasure to be involved in the care of this nice patient.    Tim Heredia MD, FACC    cc:  Kuamr Webber MD  [facility] [address], [address]  [place] [address]    Tim Morales MD, FACC        D: 2022   T: 2022   MT:     Name:     AVERY RICKETTS  MRN:      [id number]        Account:      [id number]   :      1955           Service Date: 2022       Document: [id number]      Thank you for allowing me to participate in the care of your patient.      Sincerely,     Tim Heredia MD, MD     [facility]  cc:   Tim Heredia MD  [address] [facility] [place] [address] [address]

## 2022-06-24 NOTE — PROGRESS NOTES
HPI and Plan:   See dictation          Orders Placed This Encounter   Procedures     Basic metabolic panel     Lipid Profile     ALT     Follow-Up with Cardiology       Orders Placed This Encounter   Medications     Biotin 44503 MCG TABS     Sig: Take 1 tablet by mouth daily     vitamin C (ASCORBIC ACID) 1000 MG TABS     Sig: Take 1,000 mg by mouth daily     Cholecalciferol (VITAMIN D) 50 MCG (2000 UT) CAPS     Sig: Take 1 tablet by mouth daily     magnesium oxide (MAGNESIUM OXIDE) 400 MG tablet     Sig: Take 400 mg by mouth daily       There are no discontinued medications.      Encounter Diagnoses   Name Primary?     Coronary artery disease involving native coronary artery of native heart without angina pectoris      Essential hypertension      Ischemic cardiomyopathy      Old myocardial infarction        CURRENT MEDICATIONS:  Current Outpatient Medications   Medication Sig Dispense Refill     aspirin 81 MG tablet Take 1 tablet (81 mg) by mouth daily 30 tablet      atorvastatin (LIPITOR) 80 MG tablet Take 1 tablet (80 mg) by mouth daily 30 tablet 11     Biotin 21833 MCG TABS Take 1 tablet by mouth daily       carvedilol (COREG) 3.125 MG tablet Take 1 tablet (3.125 mg) by mouth 2 times daily 60 tablet 11     Cholecalciferol (VITAMIN D) 50 MCG (2000 UT) CAPS Take 1 tablet by mouth daily       HYDROcodone-acetaminophen (NORCO) 5-325 MG tablet Take 1 tablet by mouth every 6 hours as needed for pain 8 tablet 0     losartan (COZAAR) 25 MG tablet Take 1 tablet (25 mg) by mouth daily 90 tablet 3     magnesium oxide (MAGNESIUM OXIDE) 400 MG tablet Take 400 mg by mouth daily       nitroGLYcerin (NITROSTAT) 0.4 MG sublingual tablet Place 1 tablet (0.4 mg) under the tongue every 5 minutes as needed for chest pain if you are still having symptoms after 3 doses (15 minutes) call 911. 25 tablet 2     Probiotic Product (PROBIOTIC PO) Take 1 capsule by mouth daily       vitamin C (ASCORBIC ACID) 1000 MG TABS Take 1,000 mg by mouth  daily         ALLERGIES     Allergies   Allergen Reactions     Cellcept [Mycophenolate]      Levaquin [Levofloxacin]      Lisinopril Cough       PAST MEDICAL HISTORY:  Past Medical History:   Diagnosis Date     Benign essential hypertension      BOOP (bronchiolitis obliterans with organizing pneumonia) (H)     post radiation     Breast CA (H)     left side     CAD (coronary artery disease), native coronary artery     STEMI 2016  PCI mid LAD 16     DM (diabetes mellitus screen)      Dyslipidemia      Ex-smoker      Fatty liver disease, nonalcoholic      Fracture of left ankle      Ischemic cardiomyopathy      Sleep apnea     Cpap       PAST SURGICAL HISTORY:  Past Surgical History:   Procedure Laterality Date     HEART CATH, ANGIOPLASTY       STENT, CORONARY, S660 15/18  2016    MABEL=LAD       FAMILY HISTORY:  Family History   Problem Relation Age of Onset     Hypertension Mother      Myocardial Infarction Mother      Heart Disease Father      Diabetes Brother      Heart Disease Brother        SOCIAL HISTORY:  Social History     Socioeconomic History     Marital status:    Tobacco Use     Smoking status: Former Smoker     Packs/day: 1.00     Years: 25.00     Pack years: 25.00     Types: Cigarettes     Quit date:      Years since quittin.4     Smokeless tobacco: Never Used   Substance and Sexual Activity     Alcohol use: Yes     Alcohol/week: 0.0 standard drinks     Comment: a few glasses of wine weekly   Other Topics Concern     Parent/sibling w/ CABG, MI or angioplasty before 65F 55M? Yes     Caffeine Concern Yes     Comment: 1-2  cup daily     Special Diet Yes     Comment: low NA     Exercise Yes     Comment: heart rehab, walk       Review of Systems:  Skin:          Eyes:         ENT:         Respiratory:  Positive for sleep apnea     Cardiovascular:  Negative      Gastroenterology:        Genitourinary:         Musculoskeletal:  Positive for back pain upper back pain left  "side  Neurologic:         Psychiatric:         Heme/Lymph/Imm:         Endocrine:           Physical Exam:  Vitals: /62 (BP Location: Right arm, Patient Position: Sitting, Cuff Size: Adult Regular)   Pulse 76   Ht 1.575 m (5' 2\")   Wt 72.2 kg (159 lb 3.2 oz)   LMP  (LMP Unknown)   SpO2 95%   BMI 29.12 kg/m      Constitutional:  cooperative, alert and oriented, well developed, well nourished, in no acute distress overweight      Skin:  warm and dry to the touch, no apparent skin lesions or masses noted          Head:  normocephalic, no masses or lesions        Eyes:  pupils equal and round;conjunctivae and lids unremarkable;sclera white        Lymph:      ENT:  no pallor or cyanosis        Neck:  JVP normal;no carotid bruit        Respiratory:  normal breath sounds, clear to auscultation, normal A-P diameter, normal symmetry, normal respiratory excursion, no use of accessory muscles         Cardiac: normal S1 and S2;regular rhythm;apical impulse not displaced   S4 no presence of murmur          pulses full and equal, no bruits auscultated                                   left radial site intact without hum or bruit    GI:  not assessed this visit        Extremities and Muscular Skeletal:  no deformities, clubbing, cyanosis, erythema observed;no edema              Neurological:  no gross motor deficits;affect appropriate        Psych:  Alert and Oriented x 3        CC  Tim Heredia MD  [address]  [place] [address]              "

## 2022-06-24 NOTE — PROGRESS NOTES
Service Date: 06/24/2022    REFERRING PHYSICIAN:  Dr. Kumar Webber.    HISTORY OF PRESENT ILLNESS:  It is my pleasure to see your patient, Bernard Dodge, who is a 66-year-old patient who suffered an acute ST elevation anterior myocardial infarct and underwent emergency stenting of the left anterior descending artery.  Immediately post myocardial infarct, her ejection fraction was in the 40%-45% range, but on medical therapy and after revascularization her ejection fraction returned to a normal EF of 55%-60%.    With that background in mind, the patient is doing well.  She has no chest pains, no chest pressure, no unusual shortness of breath.  Most recent echocardiogram was performed on the 9th of March of this year, which showed again normal EF of 55%-60%, no significant valvular heart disease noted.  Her mother has a dilated ascending aorta, but her ascending aorta is normal, measuring 2.8 cm, which is well within the normal range.  The aortic root is also normal at 3.0 cm.  Blood pressure is well controlled at 138/62 today, and her pulse is 76 beats per minute.  Her lipid profile in March was excellent with an LDL of 35, HDL of 57 and triglycerides of 85 with a total cholesterol of 109.  This patient is on losartan appropriately post myocardial infarct, and we do check a basic metabolic profile every year, and this again was normal on 03/28 with a potassium of 3.7, a BUN of 15, creatinine 0.8 and a GFR of 81.  Her liver function tests are also normal with an ALT of 31.    IMPRESSION:    1.  Status post anterior myocardial infarct.  The patient is asymptomatic with respect to coronary artery disease, and her ejection fraction returned to normal post revascularization and on medical therapy.  2.  Normotensive.  3.  Excellent lipid profile, well within secondary prevention guidelines.  4.  Impressive lifestyle changes with a significant drop in weight from 192 pounds in 2019, down to 159 pounds today.  This has  resulted in a marked improvement in lipids and blood pressure.    PLAN:  We will continue the patient on her good present medications.  The patient will continue her excellent lifestyle changes.  We will see her back again in 1 year's time and repeat her lipids and basic metabolic profile at that stage.  At this time, we do not need to repeat her echocardiogram as her ejection fraction has returned to normal.    It has been my pleasure to be involved in the care of this nice patient.    Tim Heredia MD, FACC    cc:  Kumar Webber MD  [facility] [address], [place] [address]    Tim Morales MD, FACC        D: 2022   T: 2022   MT: [email or web address]    Name:     AVERY RICKETTS  MRN:      [id number]        Account:      [id number]   :      1955           Service Date: 2022       Document: [id number]

## 2022-10-22 ENCOUNTER — HEALTH MAINTENANCE LETTER (OUTPATIENT)
Age: 67
End: 2022-10-22

## 2023-06-01 ENCOUNTER — HEALTH MAINTENANCE LETTER (OUTPATIENT)
Age: 68
End: 2023-06-01

## 2023-10-03 ENCOUNTER — LAB (OUTPATIENT)
Dept: LAB | Facility: CLINIC | Age: 68
End: 2023-10-03
Attending: INTERNAL MEDICINE
Payer: MEDICARE

## 2023-10-03 DIAGNOSIS — I25.10 CORONARY ARTERY DISEASE INVOLVING NATIVE CORONARY ARTERY OF NATIVE HEART WITHOUT ANGINA PECTORIS: ICD-10-CM

## 2023-10-03 DIAGNOSIS — I10 ESSENTIAL HYPERTENSION: ICD-10-CM

## 2023-10-03 LAB
ALT SERPL W P-5'-P-CCNC: 17 U/L (ref 0–50)
ANION GAP SERPL CALCULATED.3IONS-SCNC: 11 MMOL/L (ref 7–15)
BUN SERPL-MCNC: 16.4 MG/DL (ref 8–23)
CALCIUM SERPL-MCNC: 9.6 MG/DL (ref 8.8–10.2)
CHLORIDE SERPL-SCNC: 102 MMOL/L (ref 98–107)
CHOLEST SERPL-MCNC: 143 MG/DL
CREAT SERPL-MCNC: 0.77 MG/DL (ref 0.51–0.95)
DEPRECATED HCO3 PLAS-SCNC: 25 MMOL/L (ref 22–29)
EGFRCR SERPLBLD CKD-EPI 2021: 84 ML/MIN/1.73M2
GLUCOSE SERPL-MCNC: 118 MG/DL (ref 70–99)
HDLC SERPL-MCNC: 62 MG/DL
LDLC SERPL CALC-MCNC: 62 MG/DL
NONHDLC SERPL-MCNC: 81 MG/DL
POTASSIUM SERPL-SCNC: 4.2 MMOL/L (ref 3.4–5.3)
SODIUM SERPL-SCNC: 138 MMOL/L (ref 135–145)
TRIGL SERPL-MCNC: 93 MG/DL

## 2023-10-03 PROCEDURE — 36415 COLL VENOUS BLD VENIPUNCTURE: CPT | Performed by: INTERNAL MEDICINE

## 2023-10-03 PROCEDURE — 84460 ALANINE AMINO (ALT) (SGPT): CPT | Performed by: INTERNAL MEDICINE

## 2023-10-03 PROCEDURE — 80061 LIPID PANEL: CPT | Performed by: INTERNAL MEDICINE

## 2023-10-03 PROCEDURE — 80048 BASIC METABOLIC PNL TOTAL CA: CPT | Performed by: INTERNAL MEDICINE

## 2023-10-06 ENCOUNTER — OFFICE VISIT (OUTPATIENT)
Dept: CARDIOLOGY | Facility: CLINIC | Age: 68
End: 2023-10-06
Payer: MEDICARE

## 2023-10-06 VITALS
WEIGHT: 173.9 LBS | HEIGHT: 62 IN | HEART RATE: 78 BPM | DIASTOLIC BLOOD PRESSURE: 74 MMHG | SYSTOLIC BLOOD PRESSURE: 158 MMHG | BODY MASS INDEX: 32 KG/M2 | OXYGEN SATURATION: 97 %

## 2023-10-06 DIAGNOSIS — R07.9 CHEST PAIN, UNSPECIFIED TYPE: ICD-10-CM

## 2023-10-06 DIAGNOSIS — R03.0 ELEVATED BLOOD PRESSURE READING WITHOUT DIAGNOSIS OF HYPERTENSION: ICD-10-CM

## 2023-10-06 DIAGNOSIS — I10 ESSENTIAL HYPERTENSION: ICD-10-CM

## 2023-10-06 DIAGNOSIS — I25.5 ISCHEMIC CARDIOMYOPATHY: ICD-10-CM

## 2023-10-06 DIAGNOSIS — I25.10 CORONARY ARTERY DISEASE INVOLVING NATIVE CORONARY ARTERY OF NATIVE HEART WITHOUT ANGINA PECTORIS: Primary | ICD-10-CM

## 2023-10-06 DIAGNOSIS — I25.2 OLD MYOCARDIAL INFARCTION: ICD-10-CM

## 2023-10-06 PROCEDURE — 99214 OFFICE O/P EST MOD 30 MIN: CPT | Performed by: INTERNAL MEDICINE

## 2023-10-06 NOTE — LETTER
10/6/2023    Kumar Webebr MD  [address] [place] [address]    RE: Bernard Dodge       Dear Colleague,     I had the pleasure of seeing Bernard Dodge in the [facility].  HPI and Plan:     It is my pleasure to see your patient Bernard Dodge who is a 68-year-old patient who suffered an acute anterior myocardial infarct and underwent emergency stenting of the left anterior descending artery.  Initially her ejection fraction was reduced but after revascularization repeat echocardiography showed restoration of normal left ventricular systolic function.    With that background in mind the patient has had recurrence of Boop due to radiation to her chest in the past for breast cancer.  What is unusual about this is that the Boop has recurred approximately 5 to 6 years after her radiation.  She has been on steroids but that was stopped about 2 to 3 weeks ago and has made no major difference.  So she will be going back on higher dose prednisone to try and control this condition.    In the past when the patient had an acute anterior myocardial infarct she had pain in the left shoulder.  She has noticed over the last year or so that she is getting this left shoulder discomfort when she exerts herself and she is getting it more frequently.  It does not radiate to the arms or to the jaw.    Her blood pressure was raised here today at 158/74.  On repeat her blood pressure was even higher at 200/80.  The patient has been on steroids which tend to increase blood pressure although these have been stopped approximately 3 weeks ago.  She has gained a significant amount of weight because of the steroids with her weight going from 159 pounds in June of last year to 173 pounds now .    Her lipid profile 3 days ago was excellent with an LDL of 62 HDL of 62 and triglycerides of 93 with a total cholesterol of 143.  As the patient is on losartan we do check a basic metabolic profile every year and her basic  "metabolic profile was normal with a sodium 138 potassium of 4.2 BUN of 16.4 and a creatinine of 0.77 and a GFR of 84.    The patient of course did have a CT of her chest to diagnose her lung condition from radiation and it was noted on the CT scan of the chest that there was \"heavy calcification\".  Of course this patient has stenting of her left anterior descending artery which on a standard CT is indistinguishable from heavy calcification and that is almost certainly what they are seeing.  She probably also has some calcification of her coronary arteries given the fact that she has had coronary artery disease and a prior anterior myocardial infarct.    Impression:  1.  Recurrence of Boop due to radiation to her chest for breast cancer and on steroid therapy although this has been held for the last few weeks and will be restarted again in about 3 weeks.  2.  Shoulder discomfort similar to the discomfort that she had at the time of her myocardial infarct.  Ischemia needs to be ruled out.  3.  Essential hypertension.  Her blood pressure may be raised because she was on steroid therapy for about a month and she has gained some weight.  Her blood pressure went higher on repeat blood pressure check.  4.  Excellent lipid profile well within secondary prevention guidelines.  5.  Normal basic metabolic profile on losartan.    Plan:  1.  We will have the patient perform a stress echocardiogram to determine if there is any evidence of ischemia.  2.  I will have the patient wear a 24-hour blood pressure monitor to determine if her blood pressure is truly raised.  3.  I will have the patient follow-up with Kary Mack who has seen her before in 2 months time with the results of her test.  4.  I will see the patient back again in 1 years time unless of course we find abnormalities on the testing that we are going to perform.    It has been my pleasure to be involved in the care of this patient.  As always, the patient has been " told to contact us if she has any questions or any concerns.    Tim Morales MD, FACC, FRCPI    3.    Orders Placed This Encounter   Procedures    Basic metabolic panel    Lipid Profile    ALT    Follow-Up with Cardiology SIRENA    Follow-Up with Cardiology    24 Hour Blood Pressure Monitor - Adult    Exercise Stress Echocardiogram    Echocardiogram Complete       Orders Placed This Encounter   Medications    UNABLE TO FIND     Sig: daily Citracal with vitamin d 1200mg-1000IU       Medications Discontinued During This Encounter   Medication Reason    HYDROcodone-acetaminophen (NORCO) 5-325 MG tablet Therapy completed (No AVS)         Encounter Diagnoses   Name Primary?    Coronary artery disease involving native coronary artery of native heart without angina pectoris Yes    Ischemic cardiomyopathy     Essential hypertension     Old myocardial infarction     Elevated blood pressure reading without diagnosis of hypertension     Chest pain, unspecified type        CURRENT MEDICATIONS:  Current Outpatient Medications   Medication Sig Dispense Refill    aspirin 81 MG tablet Take 1 tablet (81 mg) by mouth daily 30 tablet     atorvastatin (LIPITOR) 80 MG tablet Take 1 tablet (80 mg) by mouth daily 30 tablet 11    Biotin 50470 MCG TABS Take 1 tablet by mouth daily      carvedilol (COREG) 3.125 MG tablet Take 1 tablet (3.125 mg) by mouth 2 times daily 60 tablet 11    Cholecalciferol (VITAMIN D) 50 MCG (2000 UT) CAPS Take 1 tablet by mouth daily      losartan (COZAAR) 25 MG tablet Take 1 tablet (25 mg) by mouth daily 90 tablet 3    magnesium oxide (MAGNESIUM OXIDE) 400 MG tablet Take 400 mg by mouth daily      nitroGLYcerin (NITROSTAT) 0.4 MG sublingual tablet Place 1 tablet (0.4 mg) under the tongue every 5 minutes as needed for chest pain if you are still having symptoms after 3 doses (15 minutes) call 911. 25 tablet 2    Probiotic Product (PROBIOTIC PO) Take 1 capsule by mouth daily      UNABLE TO FIND daily Citracal  with vitamin d 1200mg-1000IU      vitamin C (ASCORBIC ACID) 1000 MG TABS Take 1,000 mg by mouth daily         ALLERGIES     Allergies   Allergen Reactions    Cellcept [Mycophenolate]     Levaquin [Levofloxacin]     Lisinopril Cough       PAST MEDICAL HISTORY:  Past Medical History:   Diagnosis Date    Benign essential hypertension     BOOP (bronchiolitis obliterans with organizing pneumonia) (H)     post radiation    Breast CA (H)     left side    CAD (coronary artery disease), native coronary artery     STEMI 2016  PCI mid LAD 16    DM (diabetes mellitus screen)     Dyslipidemia     Ex-smoker     Fatty liver disease, nonalcoholic     Fracture of left ankle     Ischemic cardiomyopathy     Sleep apnea     Cpap       PAST SURGICAL HISTORY:  Past Surgical History:   Procedure Laterality Date    HEART CATH, ANGIOPLASTY      STENT, CORONARY, S660 15/18  2016    MABEL=LAD       FAMILY HISTORY:  Family History   Problem Relation Age of Onset    Hypertension Mother     Myocardial Infarction Mother     Heart Disease Father     Diabetes Brother     Heart Disease Brother        SOCIAL HISTORY:  Social History     Socioeconomic History    Marital status:      Spouse name: None    Number of children: None    Years of education: None    Highest education level: None   Tobacco Use    Smoking status: Former     Packs/day: 1.00     Years: 25.00     Pack years: 25.00     Types: Cigarettes     Quit date:      Years since quittin.7    Smokeless tobacco: Never   Substance and Sexual Activity    Alcohol use: Yes     Alcohol/week: 0.0 standard drinks of alcohol     Comment: a few glasses of wine weekly   Other Topics Concern    Parent/sibling w/ CABG, MI or angioplasty before 65F 55M? Yes    Caffeine Concern Yes     Comment: 1-2  cup daily    Special Diet Yes     Comment: low NA    Exercise Yes     Comment: heart rehab, walk       Review of Systems:  Skin:          Eyes:         ENT:         Respiratory:         "  Cardiovascular:         Gastroenterology:        Genitourinary:         Musculoskeletal:         Neurologic:         Psychiatric:         Heme/Lymph/Imm:         Endocrine:           Physical Exam:  Vitals: BP (!) 158/74   Pulse 78   Ht 1.575 m (5' 2\")   Wt 78.9 kg (173 lb 14.4 oz)   LMP  (LMP Unknown)   SpO2 97%   BMI 31.81 kg/m      Constitutional:  cooperative, alert and oriented, well developed, well nourished, in no acute distress overweight      Skin:  warm and dry to the touch, no apparent skin lesions or masses noted          Head:  normocephalic, no masses or lesions        Eyes:  pupils equal and round;conjunctivae and lids unremarkable;sclera white        Lymph:      ENT:  no pallor or cyanosis        Neck:  JVP normal;no carotid bruit;carotid pulses are full and equal bilaterally        Respiratory:  normal breath sounds, clear to auscultation, normal A-P diameter, normal symmetry, normal respiratory excursion, no use of accessory muscles         Cardiac: normal S1 and S2;regular rhythm;apical impulse not displaced   S4 no presence of murmur          pulses full and equal, no bruits auscultated                                   left radial site intact without hum or bruit    GI:  not assessed this visit        Extremities and Muscular Skeletal:  no deformities, clubbing, cyanosis, erythema observed;no edema              Neurological:  no gross motor deficits;affect appropriate        Psych:  Alert and Oriented x 3        CC  No referring provider defined for this encounter.      Thank you for allowing me to participate in the care of your patient.      Sincerely,     Tim Heredia MD, MD     [facility]  "

## 2023-10-06 NOTE — PROGRESS NOTES
HPI and Plan:     It is my pleasure to see your patient Bernard Dodge who is a 68-year-old patient who suffered an acute anterior myocardial infarct and underwent emergency stenting of the left anterior descending artery.  Initially her ejection fraction was reduced but after revascularization repeat echocardiography showed restoration of normal left ventricular systolic function.    With that background in mind the patient has had recurrence of Boop due to radiation to her chest in the past for breast cancer.  What is unusual about this is that the Boop has recurred approximately 5 to 6 years after her radiation.  She has been on steroids but that was stopped about 2 to 3 weeks ago and has made no major difference.  So she will be going back on higher dose prednisone to try and control this condition.    In the past when the patient had an acute anterior myocardial infarct she had pain in the left shoulder.  She has noticed over the last year or so that she is getting this left shoulder discomfort when she exerts herself and she is getting it more frequently.  It does not radiate to the arms or to the jaw.    Her blood pressure was raised here today at 158/74.  On repeat her blood pressure was even higher at 200/80.  The patient has been on steroids which tend to increase blood pressure although these have been stopped approximately 3 weeks ago.  She has gained a significant amount of weight because of the steroids with her weight going from 159 pounds in June of last year to 173 pounds now .    Her lipid profile 3 days ago was excellent with an LDL of 62 HDL of 62 and triglycerides of 93 with a total cholesterol of 143.  As the patient is on losartan we do check a basic metabolic profile every year and her basic metabolic profile was normal with a sodium 138 potassium of 4.2 BUN of 16.4 and a creatinine of 0.77 and a GFR of 84.    The patient of course did have a CT of her chest to diagnose her lung condition from  "radiation and it was noted on the CT scan of the chest that there was \"heavy calcification\".  Of course this patient has stenting of her left anterior descending artery which on a standard CT is indistinguishable from heavy calcification and that is almost certainly what they are seeing.  She probably also has some calcification of her coronary arteries given the fact that she has had coronary artery disease and a prior anterior myocardial infarct.    Impression:  1.  Recurrence of Boop due to radiation to her chest for breast cancer and on steroid therapy although this has been held for the last few weeks and will be restarted again in about 3 weeks.  2.  Shoulder discomfort similar to the discomfort that she had at the time of her myocardial infarct.  Ischemia needs to be ruled out.  3.  Essential hypertension.  Her blood pressure may be raised because she was on steroid therapy for about a month and she has gained some weight.  Her blood pressure went higher on repeat blood pressure check.  4.  Excellent lipid profile well within secondary prevention guidelines.  5.  Normal basic metabolic profile on losartan.    Plan:  1.  We will have the patient perform a stress echocardiogram to determine if there is any evidence of ischemia.  2.  I will have the patient wear a 24-hour blood pressure monitor to determine if her blood pressure is truly raised.  3.  I will have the patient follow-up with Kary Mack who has seen her before in 2 months time with the results of her test.  4.  I will see the patient back again in 1 years time unless of course we find abnormalities on the testing that we are going to perform.    It has been my pleasure to be involved in the care of this patient.  As always, the patient has been told to contact us if she has any questions or any concerns.    Tim Morales MD, FACC, FRCPI    3.    Orders Placed This Encounter   Procedures    Basic metabolic panel    Lipid Profile    ALT    " Follow-Up with Cardiology SIRENA    Follow-Up with Cardiology    24 Hour Blood Pressure Monitor - Adult    Exercise Stress Echocardiogram    Echocardiogram Complete       Orders Placed This Encounter   Medications    UNABLE TO FIND     Sig: daily Citracal with vitamin d 1200mg-1000IU       Medications Discontinued During This Encounter   Medication Reason    HYDROcodone-acetaminophen (NORCO) 5-325 MG tablet Therapy completed (No AVS)         Encounter Diagnoses   Name Primary?    Coronary artery disease involving native coronary artery of native heart without angina pectoris Yes    Ischemic cardiomyopathy     Essential hypertension     Old myocardial infarction     Elevated blood pressure reading without diagnosis of hypertension     Chest pain, unspecified type        CURRENT MEDICATIONS:  Current Outpatient Medications   Medication Sig Dispense Refill    aspirin 81 MG tablet Take 1 tablet (81 mg) by mouth daily 30 tablet     atorvastatin (LIPITOR) 80 MG tablet Take 1 tablet (80 mg) by mouth daily 30 tablet 11    Biotin 33646 MCG TABS Take 1 tablet by mouth daily      carvedilol (COREG) 3.125 MG tablet Take 1 tablet (3.125 mg) by mouth 2 times daily 60 tablet 11    Cholecalciferol (VITAMIN D) 50 MCG (2000 UT) CAPS Take 1 tablet by mouth daily      losartan (COZAAR) 25 MG tablet Take 1 tablet (25 mg) by mouth daily 90 tablet 3    magnesium oxide (MAGNESIUM OXIDE) 400 MG tablet Take 400 mg by mouth daily      nitroGLYcerin (NITROSTAT) 0.4 MG sublingual tablet Place 1 tablet (0.4 mg) under the tongue every 5 minutes as needed for chest pain if you are still having symptoms after 3 doses (15 minutes) call 911. 25 tablet 2    Probiotic Product (PROBIOTIC PO) Take 1 capsule by mouth daily      UNABLE TO FIND daily Citracal with vitamin d 1200mg-1000IU      vitamin C (ASCORBIC ACID) 1000 MG TABS Take 1,000 mg by mouth daily         ALLERGIES     Allergies   Allergen Reactions    Cellcept [Mycophenolate]     Levaquin  [Levofloxacin]     Lisinopril Cough       PAST MEDICAL HISTORY:  Past Medical History:   Diagnosis Date    Benign essential hypertension     BOOP (bronchiolitis obliterans with organizing pneumonia) (H)     post radiation    Breast CA (H)     left side    CAD (coronary artery disease), native coronary artery     STEMI 2016  PCI mid LAD 16    DM (diabetes mellitus screen)     Dyslipidemia     Ex-smoker     Fatty liver disease, nonalcoholic     Fracture of left ankle     Ischemic cardiomyopathy     Sleep apnea     Cpap       PAST SURGICAL HISTORY:  Past Surgical History:   Procedure Laterality Date    HEART CATH, ANGIOPLASTY      STENT, CORONARY, S660 15/18  2016    MABEL=LAD       FAMILY HISTORY:  Family History   Problem Relation Age of Onset    Hypertension Mother     Myocardial Infarction Mother     Heart Disease Father     Diabetes Brother     Heart Disease Brother        SOCIAL HISTORY:  Social History     Socioeconomic History    Marital status:      Spouse name: None    Number of children: None    Years of education: None    Highest education level: None   Tobacco Use    Smoking status: Former     Packs/day: 1.00     Years: 25.00     Pack years: 25.00     Types: Cigarettes     Quit date:      Years since quittin.7    Smokeless tobacco: Never   Substance and Sexual Activity    Alcohol use: Yes     Alcohol/week: 0.0 standard drinks of alcohol     Comment: a few glasses of wine weekly   Other Topics Concern    Parent/sibling w/ CABG, MI or angioplasty before 65F 55M? Yes    Caffeine Concern Yes     Comment: 1-2  cup daily    Special Diet Yes     Comment: low NA    Exercise Yes     Comment: heart rehab, walk       Review of Systems:  Skin:          Eyes:         ENT:         Respiratory:          Cardiovascular:         Gastroenterology:        Genitourinary:         Musculoskeletal:         Neurologic:         Psychiatric:         Heme/Lymph/Imm:         Endocrine:           Physical  "Exam:  Vitals: BP (!) 158/74   Pulse 78   Ht 1.575 m (5' 2\")   Wt 78.9 kg (173 lb 14.4 oz)   LMP  (LMP Unknown)   SpO2 97%   BMI 31.81 kg/m      Constitutional:  cooperative, alert and oriented, well developed, well nourished, in no acute distress overweight      Skin:  warm and dry to the touch, no apparent skin lesions or masses noted          Head:  normocephalic, no masses or lesions        Eyes:  pupils equal and round;conjunctivae and lids unremarkable;sclera white        Lymph:      ENT:  no pallor or cyanosis        Neck:  JVP normal;no carotid bruit;carotid pulses are full and equal bilaterally        Respiratory:  normal breath sounds, clear to auscultation, normal A-P diameter, normal symmetry, normal respiratory excursion, no use of accessory muscles         Cardiac: normal S1 and S2;regular rhythm;apical impulse not displaced   S4 no presence of murmur          pulses full and equal, no bruits auscultated                                   left radial site intact without hum or bruit    GI:  not assessed this visit        Extremities and Muscular Skeletal:  no deformities, clubbing, cyanosis, erythema observed;no edema              Neurological:  no gross motor deficits;affect appropriate        Psych:  Alert and Oriented x 3        CC  No referring provider defined for this encounter.                "

## 2023-10-09 ENCOUNTER — HOSPITAL ENCOUNTER (OUTPATIENT)
Dept: CARDIOLOGY | Facility: CLINIC | Age: 68
Discharge: HOME | End: 2023-10-09
Attending: INTERNAL MEDICINE | Admitting: INTERNAL MEDICINE
Payer: MEDICARE

## 2023-10-09 DIAGNOSIS — R03.0 ELEVATED BLOOD PRESSURE READING WITHOUT DIAGNOSIS OF HYPERTENSION: ICD-10-CM

## 2023-10-09 PROCEDURE — 93790 AMBL BP MNTR W/SW I&R: CPT | Performed by: INTERNAL MEDICINE

## 2023-10-09 PROCEDURE — 93786 AMBL BP MNTR W/SW REC ONLY: CPT

## 2023-11-03 ENCOUNTER — TELEPHONE (OUTPATIENT)
Dept: CARDIOLOGY | Facility: CLINIC | Age: 68
End: 2023-11-03

## 2023-11-03 ENCOUNTER — HOSPITAL ENCOUNTER (OUTPATIENT)
Dept: CARDIOLOGY | Facility: CLINIC | Age: 68
Discharge: HOME | End: 2023-11-03
Attending: INTERNAL MEDICINE | Admitting: INTERNAL MEDICINE
Payer: MEDICARE

## 2023-11-03 DIAGNOSIS — I25.2 OLD MYOCARDIAL INFARCTION: ICD-10-CM

## 2023-11-03 DIAGNOSIS — R07.9 CHEST PAIN, UNSPECIFIED TYPE: ICD-10-CM

## 2023-11-03 DIAGNOSIS — I25.10 CORONARY ARTERY DISEASE INVOLVING NATIVE CORONARY ARTERY OF NATIVE HEART WITHOUT ANGINA PECTORIS: ICD-10-CM

## 2023-11-03 PROCEDURE — 93321 DOPPLER ECHO F-UP/LMTD STD: CPT | Mod: 26 | Performed by: INTERNAL MEDICINE

## 2023-11-03 PROCEDURE — 93018 CV STRESS TEST I&R ONLY: CPT | Performed by: INTERNAL MEDICINE

## 2023-11-03 PROCEDURE — 255N000002 HC RX 255 OP 636: Performed by: INTERNAL MEDICINE

## 2023-11-03 PROCEDURE — C8928 TTE W OR W/O FOL W/CON,STRES: HCPCS

## 2023-11-03 PROCEDURE — 93350 STRESS TTE ONLY: CPT | Mod: 26 | Performed by: INTERNAL MEDICINE

## 2023-11-03 PROCEDURE — 93016 CV STRESS TEST SUPVJ ONLY: CPT | Performed by: INTERNAL MEDICINE

## 2023-11-03 PROCEDURE — 999N000208 ECHO STRESS ECHOCARDIOGRAM

## 2023-11-03 PROCEDURE — 93325 DOPPLER ECHO COLOR FLOW MAPG: CPT | Mod: 26 | Performed by: INTERNAL MEDICINE

## 2023-11-03 RX ADMIN — HUMAN ALBUMIN MICROSPHERES AND PERFLUTREN 3 ML: 10; .22 INJECTION, SOLUTION INTRAVENOUS at 14:19

## 2023-11-03 NOTE — TELEPHONE ENCOUNTER
Reviewed stress echo showing   1. Below average exercise capacity, 92% max HR achieved (4.8 METS). The  patient exhibited no chest pain during exercise. Test stopped due to dyspnea.  2. No EKG changes during stress, in first two minutes of recovery, 1mm  inferior and lateral J point depression with upsloping ST segments.  3. Rest echo: Distal anteroseptal and apical hypokinesis. The visual ejection  fraction is estimated at 50-55%.  4. Stress echo: With stress the baseline distal anteroseptal WMA did not  change, the area of hypokinesis appears to be slightly more. The visual  ejection fraction is 55-60%.     Nuclear stress in 2016 showed distal anteroseptal and apical transmural  infract, correlating with above WMA.     When directly compared to echo from 3/2022 the rest images appear similar.     Above findings could be consistent with old infarct, but new worsening lizbeth-  infarct ischemia is possible as hypokinetic areas seemed to extend on current  study.    Per office note dated 10/06/23, Dr. Heredia recommended:   1.  We will have the patient perform a stress echocardiogram to determine if there is any evidence of ischemia.  2.  I will have the patient wear a 24-hour blood pressure monitor to determine if her blood pressure is truly raised.  3.  I will have the patient follow-up with Kary Mack who has seen her before in 2 months time with the results of her test.  4.  I will see the patient back again in 1 years time unless of course we find abnormalities on the testing that we are going to perform.    Pt has appt to see JOSIAH Condon 12/07/23. Will message Dr. Heredia to review. Shai GONGORA

## 2023-11-03 NOTE — TELEPHONE ENCOUNTER
This patient did not have a wall motion abnormality on her previous echoes in 2022 and in 2021 so the resting wall motion abnormalities are a new finding.  Also the stress echo appears to be positive for ischemia.  I would set the patient up for a coronary angiogram given the new wall motion abnormalities at rest and evidence of ischemia with exercise.  Thanks

## 2023-11-03 NOTE — TELEPHONE ENCOUNTER
Called pt with results of stress echo & recommendations from Dr. Heredia to get coronary angiogram. Pt denies chest pain at present. Pt scheduled to see Fidelina Morales 11/06/23 for H&P for angiogram. Shai GONGORA

## 2023-11-05 NOTE — PROGRESS NOTES
HISTORY OF PRESENT ILLNESS:    This is a 68 year old female who follows with Dr Heredia at [facility]  Her past medical history includes:  Coronary artery disease, hypertension, ischemic cardiomyopathy, BOOP, sleep apneabreast cancer    Ms Dodge suffered an acute anterior MI and underwent urgent stenting of her LAD (2016) Initially her LVEF was 40-45%, but normalized following revascularization    ECHO (3/2022) showed LVEF 55-60%, normal RV function, no significant valvular pathology    She has a history of radiation pneumonitis/BOOP first diagnosed in 2014 treated with Prednisone  In May of this year, she was again diagnosed with pneumonia and treated with antibiotics and steroids.  Subsequently her weight leidy 20 lbs.    She was seen in our clinic last month and complained of some exertional left shoulder pain and her BP was elevated.  Therefore a stress test and 24-hour BP assessment were arranged.      24 hour BP monitor (10/9/23) showed average /59    Stress echo (11/3/23) showed LVEF 50-55% with resting wall motion in distal anteroseptal region which did not change with exercise (similar area effected on prior NUC in 2016)  Findings consistent with old infarct, but new worsening lizbeth-infarct ischemia is possible as hypokinetic areas seemed to extend on current study  Below average exercise capacity  BP: 140/78    Our visit today is for further review    Ms Dodge comes in with her two daughters today  She continues to have intermittent erratic left shoulder pain which resolves quickly  She has not tried SL NTG.  She states that her breathing is good on Prednisone 30 mg/day  She has not peripheral edema or orthopnea.  A few months ago, she noted some rapid palpitations which lasted 1 minute, otherwise she denies any significant palpitaitons      VITAL SIGNS:  BP: 132/58  Pulse:  80  Weight:  172 lbs  (BMI: 31)      IMPRESSION AND PLAN:    Coronary Artery Disease:  -s/p acute  anterior STEMI  with urgent LAD stenting (2016)  -recent abnormal streco showing new resting anteroseptal WMA   -left shoulder pain  -Plan for coronary angiography:  Discussed risks, and benefits involved to include but not limited to:  bleeding, reaction to sedation, IV-dye nephropathy, arterial injury, hematoma, dysrhythmias, stroke, MI, emergent CABG  Pt in agreement and willing to proceed.    Resolved Ischemic Cardiomyopathy  -LVEF 50-55%  -no signs or symptoms of heart failure    Hypertension  -on Losartan 25 mg, Coreg 3.125 mg BID  -recent 24-hour BP showed average: 128/59  -BP controlled    Hyperlipidemia  -on Atorvastatin 80 mg  -LDL  62    BOOP  -recent recurrence    The total time for the visit today was 31 minutes which includes patient visit, reviewing of records, discussion, and placing of orders of the outpatient coordination of cardiovascular care as described.  The level of medical decision making during this visit was of moderate complexity.  Thank you for allowing me to participate in their care.     Orders Placed This Encounter   Procedures    Case Request Cath Lab: Coronary Angiogram, Percutaneous Coronary Intervention       Orders Placed This Encounter   Medications    nitroGLYcerin (NITROSTAT) 0.4 MG sublingual tablet     Sig: Place 1 tablet (0.4 mg) under the tongue every 5 minutes as needed for chest pain if you are still having symptoms after 3 doses (15 minutes) call 911.     Dispense:  25 tablet     Refill:  2       Medications Discontinued During This Encounter   Medication Reason    nitroGLYcerin (NITROSTAT) 0.4 MG sublingual tablet Reorder (No AVS)         Encounter Diagnoses   Name Primary?    Coronary artery disease involving native coronary artery of native heart without angina pectoris Yes    Abnormal stress test     ST elevation myocardial infarction involving left anterior descending (LAD) coronary artery (H)        CURRENT MEDICATIONS:  Current Outpatient Medications   Medication  Sig Dispense Refill    aspirin 81 MG tablet Take 1 tablet (81 mg) by mouth daily 30 tablet     atorvastatin (LIPITOR) 80 MG tablet Take 1 tablet (80 mg) by mouth daily 30 tablet 11    Biotin 53567 MCG TABS Take 1 tablet by mouth daily      carvedilol (COREG) 3.125 MG tablet Take 1 tablet (3.125 mg) by mouth 2 times daily 60 tablet 11    Cholecalciferol (VITAMIN D) 50 MCG (2000 UT) CAPS Take 1 tablet by mouth daily      losartan (COZAAR) 25 MG tablet Take 1 tablet (25 mg) by mouth daily 90 tablet 3    magnesium oxide (MAGNESIUM OXIDE) 400 MG tablet Take 400 mg by mouth daily      nitroGLYcerin (NITROSTAT) 0.4 MG sublingual tablet Place 1 tablet (0.4 mg) under the tongue every 5 minutes as needed for chest pain if you are still having symptoms after 3 doses (15 minutes) call 911. 25 tablet 2    UNABLE TO FIND daily Citracal with vitamin d 1200mg-1000IU      vitamin C (ASCORBIC ACID) 1000 MG TABS Take 1,000 mg by mouth daily      Probiotic Product (PROBIOTIC PO) Take 1 capsule by mouth daily         ALLERGIES     Allergies   Allergen Reactions    Cellcept [Mycophenolate]     Levaquin [Levofloxacin]     Lisinopril Cough       PAST MEDICAL HISTORY:  Past Medical History:   Diagnosis Date    Benign essential hypertension     BOOP (bronchiolitis obliterans with organizing pneumonia) (H)     post radiation    Breast CA (H)     left side    CAD (coronary artery disease), native coronary artery     STEMI 8/2016  PCI mid LAD 8/22/16    DM (diabetes mellitus screen)     Dyslipidemia     Ex-smoker     Fatty liver disease, nonalcoholic     Fracture of left ankle     Ischemic cardiomyopathy     Sleep apnea     Cpap       PAST SURGICAL HISTORY:  Past Surgical History:   Procedure Laterality Date    HEART CATH, ANGIOPLASTY      STENT, CORONARY, S660 15/18  08/22/2016    MABEL=LAD       FAMILY HISTORY:  Family History   Problem Relation Age of Onset    Hypertension Mother     Myocardial Infarction Mother     Heart Disease Father      "Diabetes Brother     Heart Disease Brother        SOCIAL HISTORY:  Social History     Socioeconomic History    Marital status:      Spouse name: None    Number of children: None    Years of education: None    Highest education level: None   Tobacco Use    Smoking status: Former     Packs/day: 1.00     Years: 25.00     Additional pack years: 0.00     Total pack years: 25.00     Types: Cigarettes     Quit date:      Years since quittin.8    Smokeless tobacco: Never   Substance and Sexual Activity    Alcohol use: Yes     Alcohol/week: 0.0 standard drinks of alcohol     Comment: a few glasses of wine weekly   Other Topics Concern    Parent/sibling w/ CABG, MI or angioplasty before 65F 55M? Yes    Caffeine Concern Yes     Comment: 1-2  cup daily    Special Diet Yes     Comment: low NA    Exercise Yes     Comment: heart rehab, walk       Review of Systems:  Skin:          Eyes:  Positive for glasses reading  ENT:  Negative      Respiratory:  Positive for sleep apnea     Cardiovascular:  Negative      Gastroenterology:        Genitourinary:         Musculoskeletal:         Neurologic:         Psychiatric:         Heme/Lymph/Imm:         Endocrine:           Physical Exam:  Vitals: /58 (BP Location: Right arm, Patient Position: Sitting, Cuff Size: Adult Regular)   Pulse 80   Ht 1.575 m (5' 2\")   Wt 78 kg (172 lb)   LMP  (LMP Unknown)   SpO2 96%   BMI 31.46 kg/m      Constitutional:  cooperative overweight      Skin:  warm and dry to the touch          Head:  normocephalic        Eyes:  pupils equal and round;sclera white        Lymph:      ENT:  no pallor or cyanosis        Neck:  JVP normal;no carotid bruit        Respiratory:  normal respiratory excursion;clear to auscultation         Cardiac: normal S1 and S2;regular rhythm;apical impulse not displaced   S4 no presence of murmur          pulses full and equal                                        GI:  not assessed this visit    "     Extremities and Muscular Skeletal:  no edema              Neurological:  no gross motor deficits;affect appropriate        Psych:  Alert and Oriented x 3          CC  No referring provider defined for this encounter.

## 2023-11-06 ENCOUNTER — OFFICE VISIT (OUTPATIENT)
Dept: CARDIOLOGY | Facility: CLINIC | Age: 68
End: 2023-11-06
Payer: MEDICARE

## 2023-11-06 VITALS
DIASTOLIC BLOOD PRESSURE: 58 MMHG | OXYGEN SATURATION: 96 % | HEIGHT: 62 IN | BODY MASS INDEX: 31.65 KG/M2 | HEART RATE: 80 BPM | WEIGHT: 172 LBS | SYSTOLIC BLOOD PRESSURE: 132 MMHG

## 2023-11-06 DIAGNOSIS — I25.10 CORONARY ARTERY DISEASE INVOLVING NATIVE CORONARY ARTERY OF NATIVE HEART WITHOUT ANGINA PECTORIS: Primary | ICD-10-CM

## 2023-11-06 DIAGNOSIS — R94.39 ABNORMAL STRESS TEST: ICD-10-CM

## 2023-11-06 DIAGNOSIS — I21.02 ST ELEVATION MYOCARDIAL INFARCTION INVOLVING LEFT ANTERIOR DESCENDING (LAD) CORONARY ARTERY (H): ICD-10-CM

## 2023-11-06 PROCEDURE — 99214 OFFICE O/P EST MOD 30 MIN: CPT | Performed by: NURSE PRACTITIONER

## 2023-11-06 RX ORDER — NITROGLYCERIN 0.4 MG/1
0.4 TABLET SUBLINGUAL EVERY 5 MIN PRN
Qty: 25 TABLET | Refills: 2 | Status: SHIPPED | OUTPATIENT
Start: 2023-11-06 | End: 2024-07-26

## 2023-11-06 NOTE — LETTER
11/6/2023    Kumar Webber MD  [address] [place] [address]    RE: Bernard Donisman       Dear Colleague,     I had the pleasure of seeing Bernard Dodge in the [facility].  HISTORY OF PRESENT ILLNESS:    This is a 68 year old female who follows with Dr Heredia at [facility]  Her past medical history includes:  Coronary artery disease, hypertension, ischemic cardiomyopathy, BOOP, sleep apneabreast cancer    Ms Dodge suffered an acute anterior MI and underwent urgent stenting of her LAD (2016) Initially her LVEF was 40-45%, but normalized following revascularization    ECHO (3/2022) showed LVEF 55-60%, normal RV function, no significant valvular pathology    She has a history of radiation pneumonitis/BOOP first diagnosed in 2014 treated with Prednisone  In May of this year, she was again diagnosed with pneumonia and treated with antibiotics and steroids.  Subsequently her weight leidy 20 lbs.    She was seen in our clinic last month and complained of some exertional left shoulder pain and her BP was elevated.  Therefore a stress test and 24-hour BP assessment were arranged.      24 hour BP monitor (10/9/23) showed average /59    Stress echo (11/3/23) showed LVEF 50-55% with resting wall motion in distal anteroseptal region which did not change with exercise (similar area effected on prior NUC in 2016)  Findings consistent with old infarct, but new worsening lizbeth-infarct ischemia is possible as hypokinetic areas seemed to extend on current study  Below average exercise capacity  BP: 140/78    Our visit today is for further review    Ms Dodge comes in with her two daughters today  She continues to have intermittent erratic left shoulder pain which resolves quickly  She has not tried SL NTG.  She states that her breathing is good on Prednisone 30 mg/day  She has not peripheral edema or orthopnea.  A few months ago, she noted some rapid palpitations which lasted 1  minute, otherwise she denies any significant palpitaitons      VITAL SIGNS:  BP: 132/58  Pulse:  80  Weight:  172 lbs  (BMI: 31)      IMPRESSION AND PLAN:    Coronary Artery Disease:  -s/p acute anterior STEMI  with urgent LAD stenting (2016)  -recent abnormal streco showing new resting anteroseptal WMA   -left shoulder pain  -Plan for coronary angiography:  Discussed risks, and benefits involved to include but not limited to:  bleeding, reaction to sedation, IV-dye nephropathy, arterial injury, hematoma, dysrhythmias, stroke, MI, emergent CABG  Pt in agreement and willing to proceed.    Resolved Ischemic Cardiomyopathy  -LVEF 50-55%  -no signs or symptoms of heart failure    Hypertension  -on Losartan 25 mg, Coreg 3.125 mg BID  -recent 24-hour BP showed average: 128/59  -BP controlled    Hyperlipidemia  -on Atorvastatin 80 mg  -LDL  62    BOOP  -recent recurrence    The total time for the visit today was 31 minutes which includes patient visit, reviewing of records, discussion, and placing of orders of the outpatient coordination of cardiovascular care as described.  The level of medical decision making during this visit was of moderate complexity.  Thank you for allowing me to participate in their care.     Orders Placed This Encounter   Procedures    Case Request Cath Lab: Coronary Angiogram, Percutaneous Coronary Intervention       Orders Placed This Encounter   Medications    nitroGLYcerin (NITROSTAT) 0.4 MG sublingual tablet     Sig: Place 1 tablet (0.4 mg) under the tongue every 5 minutes as needed for chest pain if you are still having symptoms after 3 doses (15 minutes) call 911.     Dispense:  25 tablet     Refill:  2       Medications Discontinued During This Encounter   Medication Reason    nitroGLYcerin (NITROSTAT) 0.4 MG sublingual tablet Reorder (No AVS)         Encounter Diagnoses   Name Primary?    Coronary artery disease involving native coronary artery of native heart without angina pectoris Yes     Abnormal stress test     ST elevation myocardial infarction involving left anterior descending (LAD) coronary artery (H)        CURRENT MEDICATIONS:  Current Outpatient Medications   Medication Sig Dispense Refill    aspirin 81 MG tablet Take 1 tablet (81 mg) by mouth daily 30 tablet     atorvastatin (LIPITOR) 80 MG tablet Take 1 tablet (80 mg) by mouth daily 30 tablet 11    Biotin 24899 MCG TABS Take 1 tablet by mouth daily      carvedilol (COREG) 3.125 MG tablet Take 1 tablet (3.125 mg) by mouth 2 times daily 60 tablet 11    Cholecalciferol (VITAMIN D) 50 MCG (2000 UT) CAPS Take 1 tablet by mouth daily      losartan (COZAAR) 25 MG tablet Take 1 tablet (25 mg) by mouth daily 90 tablet 3    magnesium oxide (MAGNESIUM OXIDE) 400 MG tablet Take 400 mg by mouth daily      nitroGLYcerin (NITROSTAT) 0.4 MG sublingual tablet Place 1 tablet (0.4 mg) under the tongue every 5 minutes as needed for chest pain if you are still having symptoms after 3 doses (15 minutes) call 911. 25 tablet 2    UNABLE TO FIND daily Citracal with vitamin d 1200mg-1000IU      vitamin C (ASCORBIC ACID) 1000 MG TABS Take 1,000 mg by mouth daily      Probiotic Product (PROBIOTIC PO) Take 1 capsule by mouth daily         ALLERGIES     Allergies   Allergen Reactions    Cellcept [Mycophenolate]     Levaquin [Levofloxacin]     Lisinopril Cough       PAST MEDICAL HISTORY:  Past Medical History:   Diagnosis Date    Benign essential hypertension     BOOP (bronchiolitis obliterans with organizing pneumonia) (H)     post radiation    Breast CA (H)     left side    CAD (coronary artery disease), native coronary artery     STEMI 8/2016  PCI mid LAD 8/22/16    DM (diabetes mellitus screen)     Dyslipidemia     Ex-smoker     Fatty liver disease, nonalcoholic     Fracture of left ankle     Ischemic cardiomyopathy     Sleep apnea     Cpap       PAST SURGICAL HISTORY:  Past Surgical History:   Procedure Laterality Date    HEART CATH, ANGIOPLASTY      STENT,  "CORONARY, S660 15/18  2016    MABEL=LAD       FAMILY HISTORY:  Family History   Problem Relation Age of Onset    Hypertension Mother     Myocardial Infarction Mother     Heart Disease Father     Diabetes Brother     Heart Disease Brother        SOCIAL HISTORY:  Social History     Socioeconomic History    Marital status:      Spouse name: None    Number of children: None    Years of education: None    Highest education level: None   Tobacco Use    Smoking status: Former     Packs/day: 1.00     Years: 25.00     Additional pack years: 0.00     Total pack years: 25.00     Types: Cigarettes     Quit date:      Years since quittin.8    Smokeless tobacco: Never   Substance and Sexual Activity    Alcohol use: Yes     Alcohol/week: 0.0 standard drinks of alcohol     Comment: a few glasses of wine weekly   Other Topics Concern    Parent/sibling w/ CABG, MI or angioplasty before 65F 55M? Yes    Caffeine Concern Yes     Comment: 1-2  cup daily    Special Diet Yes     Comment: low NA    Exercise Yes     Comment: heart rehab, walk       Review of Systems:  Skin:          Eyes:  Positive for glasses reading  ENT:  Negative      Respiratory:  Positive for sleep apnea     Cardiovascular:  Negative      Gastroenterology:        Genitourinary:         Musculoskeletal:         Neurologic:         Psychiatric:         Heme/Lymph/Imm:         Endocrine:           Physical Exam:  Vitals: /58 (BP Location: Right arm, Patient Position: Sitting, Cuff Size: Adult Regular)   Pulse 80   Ht 1.575 m (5' 2\")   Wt 78 kg (172 lb)   LMP  (LMP Unknown)   SpO2 96%   BMI 31.46 kg/m      Constitutional:  cooperative overweight      Skin:  warm and dry to the touch          Head:  normocephalic        Eyes:  pupils equal and round;sclera white        Lymph:      ENT:  no pallor or cyanosis        Neck:  JVP normal;no carotid bruit        Respiratory:  normal respiratory excursion;clear to auscultation         Cardiac: " normal S1 and S2;regular rhythm;apical impulse not displaced   S4 no presence of murmur          pulses full and equal                                        GI:  not assessed this visit        Extremities and Muscular Skeletal:  no edema              Neurological:  no gross motor deficits;affect appropriate        Psych:  Alert and Oriented x 3          CC  No referring provider defined for this encounter.      Thank you for allowing me to participate in the care of your patient.      Sincerely,     CHEKO Barroso [facility]  cc:   No referring provider defined for this encounter.

## 2023-11-17 ENCOUNTER — TELEPHONE (OUTPATIENT)
Dept: MEDSURG UNIT | Facility: CLINIC | Age: 68
End: 2023-11-17
Payer: MEDICARE

## 2023-11-17 DIAGNOSIS — R94.39 ABNORMAL CARDIOVASCULAR STRESS TEST: ICD-10-CM

## 2023-11-17 DIAGNOSIS — I25.10 CORONARY ARTERY DISEASE INVOLVING NATIVE CORONARY ARTERY OF NATIVE HEART WITHOUT ANGINA PECTORIS: Primary | ICD-10-CM

## 2023-11-17 RX ORDER — SODIUM CHLORIDE 9 MG/ML
INJECTION, SOLUTION INTRAVENOUS CONTINUOUS
Status: CANCELLED | OUTPATIENT
Start: 2023-11-17

## 2023-11-17 RX ORDER — ASPIRIN 325 MG
325 TABLET ORAL ONCE
Status: CANCELLED | OUTPATIENT
Start: 2023-11-17 | End: 2023-11-17

## 2023-11-17 RX ORDER — LIDOCAINE 40 MG/G
CREAM TOPICAL
Status: CANCELLED | OUTPATIENT
Start: 2023-11-17

## 2023-11-17 RX ORDER — POTASSIUM CHLORIDE 1500 MG/1
20 TABLET, EXTENDED RELEASE ORAL
Status: CANCELLED | OUTPATIENT
Start: 2023-11-17

## 2023-11-17 RX ORDER — ASPIRIN 81 MG/1
243 TABLET, CHEWABLE ORAL ONCE
Status: CANCELLED | OUTPATIENT
Start: 2023-11-17

## 2023-11-17 NOTE — TELEPHONE ENCOUNTER
Coronary angiogram/PCI/Right Heart Cath prep instructions.     Patient is scheduled for a Coronary Angio w/possible PCI at [facility] - [address] [place] [address] - Main Entrance of the Hospital on 11/20/23.  Check in time is at 8:30 AM and procedure to follow.    Patient instructed to remain NPO for solid foods 8 hours prior to arrival and may have clear liquids up to 2 hours prior to arrival.    Patient Patient does not require extra fluids prior to procedure.    Patient is not diabetic.    Patient is not on anticoagulation.    Patient is not on diuretics.     Patient is taking ASA 81mg daily and will take 4 tabs (324mg) the morning of the procedure.    Pt is not on a SGLT2 inhibitor.    Pt is not on a GLP-1 Agonist    Patient advised to take their other daily medications the morning of the procedure with small sips of water.     Verified patient does not have a contrast allergy.    Verified patient has someone available to drive them home from the hospital and can stay with them for 24 hours after the procedure.     Patient advised to notify care team with any new COVID like symptoms prior to procedure.    Patient will check their temperature the morning of procedure and call [facility] at [phone] if temp is >100.0.    Patient is aware of visitor policy.    Patient expresses understanding of above instructions and denies further questions at this time.      Shai GONGORA   [facility]

## 2023-11-17 NOTE — TELEPHONE ENCOUNTER
Chart reviewed for upcoming appt.  Nursing orders in epic for procedure.  Per clinic note pt received instructions.

## 2023-11-17 NOTE — TELEPHONE ENCOUNTER
Damari Hansen Krystal; Damari Hansen; Kilness, Day; P [facility] Heart Team 5  Angiogram Orders    Location: [facility] [address], [place] [address] - Main Entrance of the Hospital    Procedure: Coronary Angio w/possible PCI    Diagnosis: abnormal stress test    Procedure Date: 11/20/23    Patient Arrival Time: 8:30    Procedure Time: 3rd case to follow    Ordering Cardiologist: Dr. Heredia/ Fidelina Morales    Performing Cardiologist: Dr. Goldstein    Cardiac Assessment Completed: Yes  Date: 11/6/23  Provider: Fidelina Morales    Pre-Procedure Labs completed: on admit    Post Procedure SIRENA appointment scheduled: Yes  Date: 12/7/23  Provider: Karime Mack    Patient Diabetic on Meds/Insulin:  No  If on Metformin, has 2 day post procedure BMP been scheduled: No  (Thursday and Friday procedures should have Monday BMP)  Patient on Coumadin/Warfarin:  No  Patient on Pradaxa/Xarelto/Eliquis:  No  Patient on Invokana/Farxiga/Jardiance/Steglatro:  No  Patient on Adlyxin/Bydureon/Byetta/Mounjaro/Ozempic/Rybelsus/Trulicity/Victoza/Wegovy: No    Does Patient have a history of bypass:  No    If yes, when was it done:  If yes, where was it done:      Pt advised to report any COVID like symptoms to care team prior to procedure.    Appointment was scheduled: Face to Face    Special instructions:  Patient prefers radial approach if possible

## 2023-11-20 ENCOUNTER — HOSPITAL ENCOUNTER (OUTPATIENT)
Facility: CLINIC | Age: 68
Discharge: HOME | End: 2023-11-20
Admitting: INTERNAL MEDICINE
Payer: MEDICARE

## 2023-11-20 VITALS
HEIGHT: 63 IN | TEMPERATURE: 98 F | WEIGHT: 169.4 LBS | SYSTOLIC BLOOD PRESSURE: 103 MMHG | RESPIRATION RATE: 18 BRPM | BODY MASS INDEX: 30.02 KG/M2 | OXYGEN SATURATION: 97 % | DIASTOLIC BLOOD PRESSURE: 45 MMHG | HEART RATE: 77 BPM

## 2023-11-20 DIAGNOSIS — R94.39 ABNORMAL CARDIOVASCULAR STRESS TEST: ICD-10-CM

## 2023-11-20 DIAGNOSIS — I25.10 CORONARY ARTERY DISEASE INVOLVING NATIVE CORONARY ARTERY OF NATIVE HEART WITHOUT ANGINA PECTORIS: ICD-10-CM

## 2023-11-20 DIAGNOSIS — R94.39 ABNORMAL STRESS TEST: ICD-10-CM

## 2023-11-20 PROBLEM — Z98.890 STATUS POST CORONARY ANGIOGRAM: Status: ACTIVE | Noted: 2023-11-20

## 2023-11-20 LAB
ACT BLD: 259 SECONDS (ref 74–150)
ANION GAP SERPL CALCULATED.3IONS-SCNC: 10 MMOL/L (ref 7–15)
APTT PPP: 22 SECONDS (ref 22–38)
BUN SERPL-MCNC: 14.7 MG/DL (ref 8–23)
CALCIUM SERPL-MCNC: 9.4 MG/DL (ref 8.8–10.2)
CHLORIDE SERPL-SCNC: 102 MMOL/L (ref 98–107)
CREAT SERPL-MCNC: 0.91 MG/DL (ref 0.51–0.95)
DEPRECATED HCO3 PLAS-SCNC: 29 MMOL/L (ref 22–29)
EGFRCR SERPLBLD CKD-EPI 2021: 69 ML/MIN/1.73M2
ERYTHROCYTE [DISTWIDTH] IN BLOOD BY AUTOMATED COUNT: 14.4 % (ref 10–15)
GLUCOSE SERPL-MCNC: 130 MG/DL (ref 70–99)
HCT VFR BLD AUTO: 43.8 % (ref 35–47)
HGB BLD-MCNC: 13.8 G/DL (ref 11.7–15.7)
INR PPP: 0.86 (ref 0.85–1.15)
MCH RBC QN AUTO: 28.9 PG (ref 26.5–33)
MCHC RBC AUTO-ENTMCNC: 31.5 G/DL (ref 31.5–36.5)
MCV RBC AUTO: 92 FL (ref 78–100)
PLATELET # BLD AUTO: 209 10E3/UL (ref 150–450)
POTASSIUM SERPL-SCNC: 4.3 MMOL/L (ref 3.4–5.3)
RBC # BLD AUTO: 4.77 10E6/UL (ref 3.8–5.2)
SODIUM SERPL-SCNC: 141 MMOL/L (ref 135–145)
WBC # BLD AUTO: 9.3 10E3/UL (ref 4–11)

## 2023-11-20 PROCEDURE — 99153 MOD SED SAME PHYS/QHP EA: CPT | Performed by: INTERNAL MEDICINE

## 2023-11-20 PROCEDURE — C1894 INTRO/SHEATH, NON-LASER: HCPCS | Performed by: INTERNAL MEDICINE

## 2023-11-20 PROCEDURE — 93005 ELECTROCARDIOGRAM TRACING: CPT

## 2023-11-20 PROCEDURE — 93799 UNLISTED CV SVC/PROCEDURE: CPT | Performed by: INTERNAL MEDICINE

## 2023-11-20 PROCEDURE — 99152 MOD SED SAME PHYS/QHP 5/>YRS: CPT | Performed by: INTERNAL MEDICINE

## 2023-11-20 PROCEDURE — 85730 THROMBOPLASTIN TIME PARTIAL: CPT | Performed by: INTERNAL MEDICINE

## 2023-11-20 PROCEDURE — C1769 GUIDE WIRE: HCPCS | Performed by: INTERNAL MEDICINE

## 2023-11-20 PROCEDURE — 250N000011 HC RX IP 250 OP 636: Mod: JZ | Performed by: INTERNAL MEDICINE

## 2023-11-20 PROCEDURE — 258N000003 HC RX IP 258 OP 636: Performed by: INTERNAL MEDICINE

## 2023-11-20 PROCEDURE — 93454 CORONARY ARTERY ANGIO S&I: CPT | Performed by: INTERNAL MEDICINE

## 2023-11-20 PROCEDURE — 36415 COLL VENOUS BLD VENIPUNCTURE: CPT | Performed by: INTERNAL MEDICINE

## 2023-11-20 PROCEDURE — 999N000184 HC STATISTIC TELEMETRY

## 2023-11-20 PROCEDURE — C1887 CATHETER, GUIDING: HCPCS | Performed by: INTERNAL MEDICINE

## 2023-11-20 PROCEDURE — 272N000001 HC OR GENERAL SUPPLY STERILE: Performed by: INTERNAL MEDICINE

## 2023-11-20 PROCEDURE — 93571 IV DOP VEL&/PRESS C FLO 1ST: CPT | Mod: 26 | Performed by: INTERNAL MEDICINE

## 2023-11-20 PROCEDURE — 93454 CORONARY ARTERY ANGIO S&I: CPT | Mod: 26 | Performed by: INTERNAL MEDICINE

## 2023-11-20 PROCEDURE — 85610 PROTHROMBIN TIME: CPT | Performed by: INTERNAL MEDICINE

## 2023-11-20 PROCEDURE — 85027 COMPLETE CBC AUTOMATED: CPT | Performed by: INTERNAL MEDICINE

## 2023-11-20 PROCEDURE — 85347 COAGULATION TIME ACTIVATED: CPT

## 2023-11-20 PROCEDURE — 999N000071 HC STATISTIC HEART CATH LAB OR EP LAB

## 2023-11-20 PROCEDURE — 80048 BASIC METABOLIC PNL TOTAL CA: CPT | Performed by: INTERNAL MEDICINE

## 2023-11-20 PROCEDURE — 250N000009 HC RX 250: Performed by: INTERNAL MEDICINE

## 2023-11-20 PROCEDURE — 250N000011 HC RX IP 250 OP 636: Performed by: INTERNAL MEDICINE

## 2023-11-20 RX ORDER — LIDOCAINE 40 MG/G
CREAM TOPICAL
Status: DISCONTINUED | OUTPATIENT
Start: 2023-11-20 | End: 2023-11-20 | Stop reason: HOSPADM

## 2023-11-20 RX ORDER — FENTANYL CITRATE 50 UG/ML
INJECTION, SOLUTION INTRAMUSCULAR; INTRAVENOUS
Status: DISCONTINUED | OUTPATIENT
Start: 2023-11-20 | End: 2023-11-20 | Stop reason: HOSPADM

## 2023-11-20 RX ORDER — FLUMAZENIL 0.1 MG/ML
0.2 INJECTION, SOLUTION INTRAVENOUS
Status: DISCONTINUED | OUTPATIENT
Start: 2023-11-20 | End: 2023-11-20 | Stop reason: HOSPADM

## 2023-11-20 RX ORDER — NALOXONE HYDROCHLORIDE 0.4 MG/ML
0.2 INJECTION, SOLUTION INTRAMUSCULAR; INTRAVENOUS; SUBCUTANEOUS
Status: DISCONTINUED | OUTPATIENT
Start: 2023-11-20 | End: 2023-11-20 | Stop reason: HOSPADM

## 2023-11-20 RX ORDER — ATROPINE SULFATE 0.1 MG/ML
0.5 INJECTION INTRAVENOUS
Status: DISCONTINUED | OUTPATIENT
Start: 2023-11-20 | End: 2023-11-20 | Stop reason: HOSPADM

## 2023-11-20 RX ORDER — VERAPAMIL HYDROCHLORIDE 2.5 MG/ML
INJECTION, SOLUTION INTRAVENOUS
Status: DISCONTINUED | OUTPATIENT
Start: 2023-11-20 | End: 2023-11-20 | Stop reason: HOSPADM

## 2023-11-20 RX ORDER — OXYCODONE HYDROCHLORIDE 5 MG/1
5 TABLET ORAL EVERY 4 HOURS PRN
Status: DISCONTINUED | OUTPATIENT
Start: 2023-11-20 | End: 2023-11-20 | Stop reason: HOSPADM

## 2023-11-20 RX ORDER — POTASSIUM CHLORIDE 1500 MG/1
20 TABLET, EXTENDED RELEASE ORAL
Status: DISCONTINUED | OUTPATIENT
Start: 2023-11-20 | End: 2023-11-20 | Stop reason: HOSPADM

## 2023-11-20 RX ORDER — IOPAMIDOL 755 MG/ML
INJECTION, SOLUTION INTRAVASCULAR
Status: DISCONTINUED | OUTPATIENT
Start: 2023-11-20 | End: 2023-11-20 | Stop reason: HOSPADM

## 2023-11-20 RX ORDER — ASPIRIN 325 MG
325 TABLET ORAL ONCE
Status: COMPLETED | OUTPATIENT
Start: 2023-11-20 | End: 2023-11-20

## 2023-11-20 RX ORDER — HEPARIN SODIUM 1000 [USP'U]/ML
INJECTION, SOLUTION INTRAVENOUS; SUBCUTANEOUS
Status: DISCONTINUED | OUTPATIENT
Start: 2023-11-20 | End: 2023-11-20 | Stop reason: HOSPADM

## 2023-11-20 RX ORDER — OXYCODONE HYDROCHLORIDE 5 MG/1
10 TABLET ORAL EVERY 4 HOURS PRN
Status: DISCONTINUED | OUTPATIENT
Start: 2023-11-20 | End: 2023-11-20 | Stop reason: HOSPADM

## 2023-11-20 RX ORDER — SODIUM CHLORIDE 9 MG/ML
INJECTION, SOLUTION INTRAVENOUS CONTINUOUS
Status: DISCONTINUED | OUTPATIENT
Start: 2023-11-20 | End: 2023-11-20 | Stop reason: HOSPADM

## 2023-11-20 RX ORDER — PREDNISONE 20 MG/1
20 TABLET ORAL DAILY
COMMUNITY
End: 2024-07-15

## 2023-11-20 RX ORDER — NALOXONE HYDROCHLORIDE 0.4 MG/ML
0.4 INJECTION, SOLUTION INTRAMUSCULAR; INTRAVENOUS; SUBCUTANEOUS
Status: DISCONTINUED | OUTPATIENT
Start: 2023-11-20 | End: 2023-11-20 | Stop reason: HOSPADM

## 2023-11-20 RX ORDER — ASPIRIN 81 MG/1
243 TABLET, CHEWABLE ORAL ONCE
Status: COMPLETED | OUTPATIENT
Start: 2023-11-20 | End: 2023-11-20

## 2023-11-20 RX ORDER — FENTANYL CITRATE 50 UG/ML
25 INJECTION, SOLUTION INTRAMUSCULAR; INTRAVENOUS
Status: DISCONTINUED | OUTPATIENT
Start: 2023-11-20 | End: 2023-11-20 | Stop reason: HOSPADM

## 2023-11-20 RX ORDER — ACETAMINOPHEN 325 MG/1
650 TABLET ORAL EVERY 4 HOURS PRN
Status: DISCONTINUED | OUTPATIENT
Start: 2023-11-20 | End: 2023-11-20 | Stop reason: HOSPADM

## 2023-11-20 RX ORDER — NITROGLYCERIN 5 MG/ML
VIAL (ML) INTRAVENOUS
Status: DISCONTINUED | OUTPATIENT
Start: 2023-11-20 | End: 2023-11-20 | Stop reason: HOSPADM

## 2023-11-20 RX ADMIN — SODIUM CHLORIDE: 9 INJECTION, SOLUTION INTRAVENOUS at 09:09

## 2023-11-20 ASSESSMENT — ACTIVITIES OF DAILY LIVING (ADL)
ADLS_ACUITY_SCORE: 35

## 2023-11-20 NOTE — Clinical Note
Potential access sites were evaluated for patency using ultrasound.   The left radial artery was selected. Access was obtained under with Sonosite guidance using a standard 18 guage needle with direct visualization of needle entry.    Provider paged for sign out.

## 2023-11-20 NOTE — PROGRESS NOTES
Pt up - walked to restroom on own >  + urination   Radial site remains dry and intact  Denies any CP -SOB

## 2023-11-20 NOTE — DISCHARGE SUMMARY
Care Suites Discharge Nursing Note    Patient Information  Name: Bernard Dodge  Age: 68 year old    Discharge Education:  Discharge instructions reviewed: Yes  Additional education/resources provided: AVS  Patient/patient representative verbalizes understanding: Yes  Patient discharging on new medications: No  Medication education completed: N/A    Discharge Plans:   Discharge location: home  Discharge ride contacted: Yes  Approximate discharge time: 1620    Discharge Criteria:  Discharge criteria met and vital signs stable: Yes    Patient Belongs:  Patient belongings returned to patient: Yes    Kyle Barger RN

## 2023-11-20 NOTE — DISCHARGE INSTRUCTIONS
Cardiac Angiogram Discharge Instructions - Radial Artery (Left wrist)    After you go home:    Have an adult stay with you until tomorrow.  Drink extra fluids for 2 days.  You may resume your normal diet.  No smoking       For 24 hours - due to the sedation you received:  Relax and take it easy.  Do NOT make any important or legal decisions.  Do NOT drive or operate machines at home or at work.  Do NOT drink alcohol.    Care of Wrist Puncture Site:    For the first 24 hrs - check the puncture site every 1-2 hours while awake.  It is normal to have soreness at the puncture site and mild tingling in your hand for up to 3 days.  Remove the bandaid after 24 hours. If there is minor oozing, apply another bandaid and remove it after 12 hours.  You may shower tomorrow.  Do NOT take a bath, or use a hot tub or pool for at least 3 days. Do NOT scrub the site. Do not use lotion or powder near the puncture site.           Activity:        For 2 days:   do not use your hand or arm to support your weight (such as rising from a chair)   do not bend your wrist (such as lifting a garage door).  do not lift more than 5 pounds or exercise your arm (such as tennis, golf or bowling).  Do NOT do any heavy activity such as exercise, lifting, or straining.     Bleeding:    If you start bleeding from the site in your wrist, sit down and press firmly on/above the site for 10 minutes.   Once bleeding stops, keep arm still for 2 hours.   Call [facility] as soon as you can.       Call 911 right away if you have heavy bleeding or bleeding that does not stop.      Medicines:    Take your medications, including blood thinners, unless your provider tells you not to.    If you have stopped any medicines, check with your provider about when to restart them.    Follow Up Appointments:    Follow up with [facility] Nurse Practitioner at [facility] of patient preference in 7-10 days.    Call the clinic if:    You have a large or growing hard lump  around the site.  The site is red, swollen, hot or tender.  Blood or fluid is draining from the site.  You have chills or a fever greater than 101 F (38 C).  Your arm feels numb, cool or changes color.  You have hives, a rash or unusual itching.  Any questions or concerns.          [facility] [place]:    [phone] [facility] (7 days a week)

## 2023-11-20 NOTE — PROGRESS NOTES
Care Suites Admission Nursing Note    Patient Information  Name: Bernard Dodge  Age: 68 year old  Reason for admission: Coronary angiogram  Care Suites arrival time: 0830    Visitor Information  Name: Omar     Patient Admission/Assessment   Pre-procedure assessment complete: Yes  If abnormal assessment/labs, provider notified: N/A  NPO: Yes  Medications held per instructions/orders: Yes  Consent: deferred  If applicable, pregnancy test status: deferred  Patient oriented to room: Yes  Education/questions answered: Yes  Plan/other: Proceed as scheduled.    Discharge Planning  Discharge name/phone number: Omar [phone]  Overnight post sedation caregiver: Omar  Discharge location: home    Nelida Maldonado RN

## 2023-11-20 NOTE — PROGRESS NOTES
MD notification: Bernard Dodge and her  Omar would like for you to come and update them on results of procedure. She is in room 15 in [facility]. Thank you, Nelida GONGORA

## 2023-11-20 NOTE — PRE-PROCEDURE
GENERAL PRE-PROCEDURE:   Procedure:  Coronary angiogram  Date/Time:  11/20/2023 11:49 AM    Verbal consent obtained?: Yes    Written consent obtained?: Yes    Risks and benefits: Risks, benefits and alternatives were discussed    Consent given by:  Patient  Patient states understanding of procedure being performed: Yes    Patient's understanding of procedure matches consent: Yes    Procedure consent matches procedure scheduled: Yes    Expected level of sedation:  Moderate  Appropriately NPO:  Yes  ASA Class:  2  Mallampati  :  Grade 3- soft palate visible, posterior pharyngeal wall not visible  Lungs:  Lungs clear with good breath sounds bilaterally  Heart:  Normal heart sounds and rate  History & Physical reviewed:  History and physical reviewed and no updates needed  Statement of review:  I have reviewed the lab findings, diagnostic data, medications, and the plan for sedation

## 2023-11-20 NOTE — PROGRESS NOTES
Care Suites Post Procedure Note    Patient Information  Name: Bernard Dodge  Age: 68 year old    Post Procedure  Time patient returned to Care Suites: 1215  Concerns/abnormal assessment: None at this time.  If abnormal assessment, provider notified: N/A  Plan/Other: Per orders.    BR til 1415  Anticipate discharge around 1600 of meets discharge criteria.    Nelida Maldonado RN      7012  Hand off report given to Kyle Barger RN.

## 2023-11-21 LAB
ATRIAL RATE - MUSE: 64 BPM
DIASTOLIC BLOOD PRESSURE - MUSE: NORMAL MMHG
INTERPRETATION ECG - MUSE: NORMAL
P AXIS - MUSE: 47 DEGREES
PR INTERVAL - MUSE: 148 MS
QRS DURATION - MUSE: 74 MS
QT - MUSE: 394 MS
QTC - MUSE: 406 MS
R AXIS - MUSE: 28 DEGREES
SYSTOLIC BLOOD PRESSURE - MUSE: NORMAL MMHG
T AXIS - MUSE: 52 DEGREES
VENTRICULAR RATE- MUSE: 64 BPM

## 2023-12-07 ENCOUNTER — OFFICE VISIT (OUTPATIENT)
Dept: CARDIOLOGY | Facility: CLINIC | Age: 68
End: 2023-12-07
Attending: INTERNAL MEDICINE
Payer: MEDICARE

## 2023-12-07 VITALS
HEIGHT: 62 IN | DIASTOLIC BLOOD PRESSURE: 58 MMHG | SYSTOLIC BLOOD PRESSURE: 132 MMHG | WEIGHT: 174 LBS | HEART RATE: 72 BPM | BODY MASS INDEX: 32.02 KG/M2 | OXYGEN SATURATION: 98 %

## 2023-12-07 DIAGNOSIS — R07.9 CHEST PAIN, UNSPECIFIED TYPE: ICD-10-CM

## 2023-12-07 DIAGNOSIS — I10 ESSENTIAL HYPERTENSION: ICD-10-CM

## 2023-12-07 DIAGNOSIS — I25.10 CORONARY ARTERY DISEASE INVOLVING NATIVE CORONARY ARTERY OF NATIVE HEART WITHOUT ANGINA PECTORIS: ICD-10-CM

## 2023-12-07 DIAGNOSIS — I25.5 ISCHEMIC CARDIOMYOPATHY: ICD-10-CM

## 2023-12-07 DIAGNOSIS — I25.2 OLD MYOCARDIAL INFARCTION: ICD-10-CM

## 2023-12-07 DIAGNOSIS — R03.0 ELEVATED BLOOD PRESSURE READING WITHOUT DIAGNOSIS OF HYPERTENSION: ICD-10-CM

## 2023-12-07 PROCEDURE — 99213 OFFICE O/P EST LOW 20 MIN: CPT | Performed by: PHYSICIAN ASSISTANT

## 2023-12-07 RX ORDER — SULFAMETHOXAZOLE AND TRIMETHOPRIM 400; 80 MG/1; MG/1
1 TABLET ORAL DAILY
COMMUNITY
Start: 2023-12-07 | End: 2024-07-15

## 2023-12-07 NOTE — LETTER
2023    Kumar Webber MD  [address] [place] [address]    RE: Bernard Dodge       Dear Colleague,     I had the pleasure of seeing Bernard Dodge in the [facility].      CARDIOLOGY CLINIC PROGRESS NOTE    DOS: 2023      Bernard Dodge  : 1955, 68 year old  MRN: [id number]      Primary cardiologist:  Dr. Heredia    History:  This is a 68 year old female with a past medical history including coronary artery disease, hypertension, ischemic cardiomyopathy, BOOP (bronchiolitis obliterans organizing pneumonia), sleep apnea, breast cancer.     Ms Dodge suffered an acute anterior MI and underwent urgent stenting of her LAD () Initially her LVEF was 40-45%, but normalized following revascularization     3/2022 echo showed LVEF 55-60%, normal RV function, no significant valvular pathology     She has a history of radiation pneumonitis/BOOP first diagnosed in  treated with Prednisone      In May of 2023, she was again diagnosed with pneumonia and treated with antibiotics and steroids.  Subsequently her weight leidy 20 lbs.     She was seen in our clinic in Oct 2023 and complained of some exertional left shoulder pain and her BP was elevated.  Therefore a stress test and 24-hour BP assessment were arranged.       10/9/23 24 hour BP monitor showed average /59     11/3/23 stress echo showed LVEF 50-55% with resting wall motion in distal anteroseptal region which did not change with exercise (similar area effected on prior NUC in )  Findings consistent with old infarct, but new worsening lizbeth-infarct ischemia is possible as hypokinetic areas seemed to extend on current study  Below average exercise capacity  BP: 140/78       Cardiac cath 23: Previously placed Prox LAD to Mid LAD stent widely patent, no new obstructive CAD.       She denies any chest discomfort.   Her BP is controlled.   She is now on prednisone 20 mg.  She sees pulm later today.    The prednisone keeps her awake at night, so she is fatigued/sleepy from poor sleep.           ROS:  Skin:        Eyes:       ENT:       Respiratory:  Positive for sleep apnea  Cardiovascular:  Negative    Gastroenterology:      Genitourinary:       Musculoskeletal:       Neurologic:       Psychiatric:       Heme/Lymph/Imm:       Endocrine:         PAST MEDICAL HISTORY:  Past Medical History:   Diagnosis Date    Benign essential hypertension     BOOP (bronchiolitis obliterans with organizing pneumonia) (H)     post radiation    Breast CA (H)     left side    CAD (coronary artery disease), native coronary artery     STEMI 2016  PCI mid LAD 16    DM (diabetes mellitus screen)     Dyslipidemia     Ex-smoker     Fatty liver disease, nonalcoholic     Fracture of left ankle     Ischemic cardiomyopathy     Sleep apnea     Cpap       PAST SURGICAL HISTORY:  Past Surgical History:   Procedure Laterality Date    CV CORONARY ANGIOGRAM N/A 2023    Procedure: Coronary Angiogram;  Surgeon: Ivan Goldstein MD;  Location:  HEART CARDIAC CATH LAB    CV INSTANTANEOUS WAVE-FREE RATIO N/A 2023    Procedure: Instantaneous Wave-Free Ratio;  Surgeon: Ivan Goldstein MD;  Location:  HEART CARDIAC CATH LAB    HEART CATH, ANGIOPLASTY      STENT, CORONARY, S660 15/18  2016    MABEL=LAD       SOCIAL HISTORY:  Social History     Socioeconomic History    Marital status:    Tobacco Use    Smoking status: Former     Packs/day: 1.00     Years: 25.00     Additional pack years: 0.00     Total pack years: 25.00     Types: Cigarettes     Quit date:      Years since quittin.9    Smokeless tobacco: Never   Vaping Use    Vaping Use: Never used   Substance and Sexual Activity    Alcohol use: Yes     Comment: 2 glasses of wine before bedtime    Drug use: Not Currently   Other Topics Concern    Parent/sibling w/ CABG, MI or angioplasty before 65F 55M? Yes    Caffeine Concern Yes     Comment: 1-2  cup  "daily    Special Diet Yes     Comment: low NA    Exercise Yes     Comment: heart rehab, walk       FAMILY HISTORY:  Family History   Problem Relation Age of Onset    Hypertension Mother     Myocardial Infarction Mother     Heart Disease Father     Diabetes Brother     Heart Disease Brother        MEDS: aspirin 81 MG tablet, Take 1 tablet (81 mg) by mouth daily  atorvastatin (LIPITOR) 80 MG tablet, Take 1 tablet (80 mg) by mouth daily  Biotin 60531 MCG TABS, Take 1 tablet by mouth daily  carvedilol (COREG) 3.125 MG tablet, Take 1 tablet (3.125 mg) by mouth 2 times daily  Cholecalciferol (VITAMIN D) 50 MCG (2000 UT) CAPS, Take 1 tablet by mouth daily  losartan (COZAAR) 25 MG tablet, Take 1 tablet (25 mg) by mouth daily  magnesium oxide (MAGNESIUM OXIDE) 400 MG tablet, Take 400 mg by mouth daily  nitroGLYcerin (NITROSTAT) 0.4 MG sublingual tablet, Place 1 tablet (0.4 mg) under the tongue every 5 minutes as needed for chest pain if you are still having symptoms after 3 doses (15 minutes) call 911.  predniSONE (DELTASONE) 20 MG tablet, Take 20 mg by mouth daily  Probiotic Product (PROBIOTIC PO), Take 1 capsule by mouth daily  sulfamethoxazole-trimethoprim (BACTRIM) 400-80 MG tablet, Take 1 tablet by mouth daily  UNABLE TO FIND, daily Citracal with vitamin d 1200mg-1000IU  vitamin C (ASCORBIC ACID) 1000 MG TABS, Take 1,000 mg by mouth daily    No current facility-administered medications on file prior to visit.      ALLERGIES:   Allergies   Allergen Reactions    Cellcept [Mycophenolate]     Levaquin [Levofloxacin]     Lisinopril Cough       PHYSICAL EXAM:  Vitals: /58 (BP Location: Right arm, Patient Position: Sitting, Cuff Size: Adult Large)   Pulse 72   Ht 1.575 m (5' 2\")   Wt 78.9 kg (174 lb)   LMP  (LMP Unknown)   SpO2 98%   BMI 31.83 kg/m    Constitutional:  cooperative;in no acute distress overweight      Skin:  warm and dry to the touch        Head:  normocephalic, no masses or lesions        Eyes:  " pupils equal and round;sclera white;conjunctivae and lids unremarkable        ENT:  no pallor or cyanosis        Neck:  not assessed this visit        Respiratory:  clear to auscultation        Cardiac: normal S1 and S2;regular rhythm     no presence of murmur            GI:  abdomen soft;BS normoactive        Vascular: pulses full and equal                                 ecchymosis at left wrist    Extremities and Musculoskeletal:  no edema        Neurological:  no gross motor deficits;affect appropriate              LABS/DATA:  I reviewed the following:  Cardiac cath 11/20/23:  Conclusion  1) No obstructive coronary artery disease   2) IFR of circumflex negative at 0.95       Plan  1) Control cardiac risk factors             ASSESSMENT/PLAN:  Coronary Artery Disease  -s/p acute anterior STEMI  with urgent LAD stenting (2016)  -recent abnormal streco showing new resting anteroseptal WMA   -Cardiac cath 11/20/23: Previously placed Prox LAD to Mid LAD stent widely patent, no new obstructive CAD.   -back pain likely from lungs  -Continue ASA, BB, ARB, statin       Resolved Ischemic Cardiomyopathy  -Resting images from stress echo 11/3/23: LVEF 50-55%. Distal anteroseptal and apical hypokinesis.   -no signs or symptoms of heart failure       Hypertension  -on Losartan 25 mg, Coreg 3.125 mg BID  -recent 24-hour BP 10/2023 showed average: 128/59  -BP controlled       Hyperlipidemia  -on Atorvastatin 80 mg  -LDL  62          Follow up:  10/2024 Dr. Heredia with echo and labs before      Karime Mack PA-C      Thank you for allowing me to participate in the care of your patient.      Sincerely,     Karime Makc PA-C     [facility]  cc:   Tim Heredia MD  [address] [place] [id number]

## 2023-12-07 NOTE — PROGRESS NOTES
CARDIOLOGY CLINIC PROGRESS NOTE    DOS: 2023      Bernard Dodge  : 1955, 68 year old  MRN: [id number]      Primary cardiologist:  Dr. Heredia    History:  This is a 68 year old female with a past medical history including coronary artery disease, hypertension, ischemic cardiomyopathy, BOOP (bronchiolitis obliterans organizing pneumonia), sleep apnea, breast cancer.     Ms Dodge suffered an acute anterior MI and underwent urgent stenting of her LAD () Initially her LVEF was 40-45%, but normalized following revascularization     3/2022 echo showed LVEF 55-60%, normal RV function, no significant valvular pathology     She has a history of radiation pneumonitis/BOOP first diagnosed in  treated with Prednisone      In May of 2023, she was again diagnosed with pneumonia and treated with antibiotics and steroids.  Subsequently her weight leidy 20 lbs.     She was seen in our clinic in Oct 2023 and complained of some exertional left shoulder pain and her BP was elevated.  Therefore a stress test and 24-hour BP assessment were arranged.       10/9/23 24 hour BP monitor showed average /59     11/3/23 stress echo showed LVEF 50-55% with resting wall motion in distal anteroseptal region which did not change with exercise (similar area effected on prior NUC in )  Findings consistent with old infarct, but new worsening lizbeth-infarct ischemia is possible as hypokinetic areas seemed to extend on current study  Below average exercise capacity  BP: 140/78       Cardiac cath 23: Previously placed Prox LAD to Mid LAD stent widely patent, no new obstructive CAD.       She denies any chest discomfort.   Her BP is controlled.   She is now on prednisone 20 mg.  She sees pulm later today.   The prednisone keeps her awake at night, so she is fatigued/sleepy from poor sleep.           ROS:  Skin:        Eyes:       ENT:       Respiratory:  Positive for sleep apnea  Cardiovascular:  Negative     Gastroenterology:      Genitourinary:       Musculoskeletal:       Neurologic:       Psychiatric:       Heme/Lymph/Imm:       Endocrine:         PAST MEDICAL HISTORY:  Past Medical History:   Diagnosis Date    Benign essential hypertension     BOOP (bronchiolitis obliterans with organizing pneumonia) (H)     post radiation    Breast CA (H)     left side    CAD (coronary artery disease), native coronary artery     STEMI 2016  PCI mid LAD 16    DM (diabetes mellitus screen)     Dyslipidemia     Ex-smoker     Fatty liver disease, nonalcoholic     Fracture of left ankle     Ischemic cardiomyopathy     Sleep apnea     Cpap       PAST SURGICAL HISTORY:  Past Surgical History:   Procedure Laterality Date    CV CORONARY ANGIOGRAM N/A 2023    Procedure: Coronary Angiogram;  Surgeon: Ivan Goldstein MD;  Location:  HEART CARDIAC CATH LAB    CV INSTANTANEOUS WAVE-FREE RATIO N/A 2023    Procedure: Instantaneous Wave-Free Ratio;  Surgeon: Ivan Goldstein MD;  Location:  HEART CARDIAC CATH LAB    HEART CATH, ANGIOPLASTY      STENT, CORONARY, S660 15/18  2016    MABEL=LAD       SOCIAL HISTORY:  Social History     Socioeconomic History    Marital status:    Tobacco Use    Smoking status: Former     Packs/day: 1.00     Years: 25.00     Additional pack years: 0.00     Total pack years: 25.00     Types: Cigarettes     Quit date:      Years since quittin.9    Smokeless tobacco: Never   Vaping Use    Vaping Use: Never used   Substance and Sexual Activity    Alcohol use: Yes     Comment: 2 glasses of wine before bedtime    Drug use: Not Currently   Other Topics Concern    Parent/sibling w/ CABG, MI or angioplasty before 65F 55M? Yes    Caffeine Concern Yes     Comment: 1-2  cup daily    Special Diet Yes     Comment: low NA    Exercise Yes     Comment: heart rehab, walk       FAMILY HISTORY:  Family History   Problem Relation Age of Onset    Hypertension Mother     Myocardial  "Infarction Mother     Heart Disease Father     Diabetes Brother     Heart Disease Brother        MEDS: aspirin 81 MG tablet, Take 1 tablet (81 mg) by mouth daily  atorvastatin (LIPITOR) 80 MG tablet, Take 1 tablet (80 mg) by mouth daily  Biotin 82119 MCG TABS, Take 1 tablet by mouth daily  carvedilol (COREG) 3.125 MG tablet, Take 1 tablet (3.125 mg) by mouth 2 times daily  Cholecalciferol (VITAMIN D) 50 MCG (2000 UT) CAPS, Take 1 tablet by mouth daily  losartan (COZAAR) 25 MG tablet, Take 1 tablet (25 mg) by mouth daily  magnesium oxide (MAGNESIUM OXIDE) 400 MG tablet, Take 400 mg by mouth daily  nitroGLYcerin (NITROSTAT) 0.4 MG sublingual tablet, Place 1 tablet (0.4 mg) under the tongue every 5 minutes as needed for chest pain if you are still having symptoms after 3 doses (15 minutes) call 911.  predniSONE (DELTASONE) 20 MG tablet, Take 20 mg by mouth daily  Probiotic Product (PROBIOTIC PO), Take 1 capsule by mouth daily  sulfamethoxazole-trimethoprim (BACTRIM) 400-80 MG tablet, Take 1 tablet by mouth daily  UNABLE TO FIND, daily Citracal with vitamin d 1200mg-1000IU  vitamin C (ASCORBIC ACID) 1000 MG TABS, Take 1,000 mg by mouth daily    No current facility-administered medications on file prior to visit.      ALLERGIES:   Allergies   Allergen Reactions    Cellcept [Mycophenolate]     Levaquin [Levofloxacin]     Lisinopril Cough       PHYSICAL EXAM:  Vitals: /58 (BP Location: Right arm, Patient Position: Sitting, Cuff Size: Adult Large)   Pulse 72   Ht 1.575 m (5' 2\")   Wt 78.9 kg (174 lb)   LMP  (LMP Unknown)   SpO2 98%   BMI 31.83 kg/m    Constitutional:  cooperative;in no acute distress overweight      Skin:  warm and dry to the touch        Head:  normocephalic, no masses or lesions        Eyes:  pupils equal and round;sclera white;conjunctivae and lids unremarkable        ENT:  no pallor or cyanosis        Neck:  not assessed this visit        Respiratory:  clear to auscultation        Cardiac: " normal S1 and S2;regular rhythm     no presence of murmur            GI:  abdomen soft;BS normoactive        Vascular: pulses full and equal                                 ecchymosis at left wrist    Extremities and Musculoskeletal:  no edema        Neurological:  no gross motor deficits;affect appropriate              LABS/DATA:  I reviewed the following:  Cardiac cath 11/20/23:  Conclusion  1) No obstructive coronary artery disease   2) IFR of circumflex negative at 0.95       Plan  1) Control cardiac risk factors             ASSESSMENT/PLAN:  Coronary Artery Disease  -s/p acute anterior STEMI  with urgent LAD stenting (2016)  -recent abnormal streco showing new resting anteroseptal WMA   -Cardiac cath 11/20/23: Previously placed Prox LAD to Mid LAD stent widely patent, no new obstructive CAD.   -back pain likely from lungs  -Continue ASA, BB, ARB, statin       Resolved Ischemic Cardiomyopathy  -Resting images from stress echo 11/3/23: LVEF 50-55%. Distal anteroseptal and apical hypokinesis.   -no signs or symptoms of heart failure       Hypertension  -on Losartan 25 mg, Coreg 3.125 mg BID  -recent 24-hour BP 10/2023 showed average: 128/59  -BP controlled       Hyperlipidemia  -on Atorvastatin 80 mg  -LDL  62          Follow up:  10/2024 Dr. Heredia with echo and labs before      Karime Mack PA-C

## 2024-06-02 ENCOUNTER — HEALTH MAINTENANCE LETTER (OUTPATIENT)
Age: 69
End: 2024-06-02

## 2024-07-14 ENCOUNTER — HOSPITAL ENCOUNTER (INPATIENT)
Facility: CLINIC | Age: 69
LOS: 4 days | Discharge: HOME | DRG: 871 | End: 2024-07-19
Attending: EMERGENCY MEDICINE | Admitting: INTERNAL MEDICINE
Payer: MEDICARE

## 2024-07-14 ENCOUNTER — APPOINTMENT (OUTPATIENT)
Dept: CT IMAGING | Facility: CLINIC | Age: 69
DRG: 871 | End: 2024-07-14
Attending: EMERGENCY MEDICINE
Payer: MEDICARE

## 2024-07-14 DIAGNOSIS — J18.9 PNEUMONIA OF RIGHT LOWER LOBE DUE TO INFECTIOUS ORGANISM: ICD-10-CM

## 2024-07-14 DIAGNOSIS — R09.02 HYPOXIA: ICD-10-CM

## 2024-07-14 DIAGNOSIS — R11.2 NAUSEA AND VOMITING, UNSPECIFIED VOMITING TYPE: ICD-10-CM

## 2024-07-14 DIAGNOSIS — E87.1 HYPONATREMIA: ICD-10-CM

## 2024-07-14 LAB
ALBUMIN SERPL BCG-MCNC: 3.8 G/DL (ref 3.5–5.2)
ALBUMIN UR-MCNC: 100 MG/DL
ALP SERPL-CCNC: 63 U/L (ref 40–150)
ALT SERPL W P-5'-P-CCNC: 35 U/L (ref 0–50)
ANION GAP SERPL CALCULATED.3IONS-SCNC: 13 MMOL/L (ref 7–15)
APPEARANCE UR: ABNORMAL
AST SERPL W P-5'-P-CCNC: 40 U/L (ref 0–45)
BACTERIA #/AREA URNS HPF: ABNORMAL /HPF
BASOPHILS # BLD AUTO: 0 10E3/UL (ref 0–0.2)
BASOPHILS NFR BLD AUTO: 0 %
BILIRUB SERPL-MCNC: 1 MG/DL
BILIRUB UR QL STRIP: NEGATIVE
BUN SERPL-MCNC: 14.5 MG/DL (ref 8–23)
CALCIUM SERPL-MCNC: 9.3 MG/DL (ref 8.8–10.2)
CHLORIDE SERPL-SCNC: 93 MMOL/L (ref 98–107)
COLOR UR AUTO: YELLOW
CREAT SERPL-MCNC: 0.99 MG/DL (ref 0.51–0.95)
DEPRECATED HCO3 PLAS-SCNC: 23 MMOL/L (ref 22–29)
EGFRCR SERPLBLD CKD-EPI 2021: 62 ML/MIN/1.73M2
EOSINOPHIL # BLD AUTO: 0 10E3/UL (ref 0–0.7)
EOSINOPHIL NFR BLD AUTO: 0 %
ERYTHROCYTE [DISTWIDTH] IN BLOOD BY AUTOMATED COUNT: 13.7 % (ref 10–15)
GLUCOSE SERPL-MCNC: 163 MG/DL (ref 70–99)
GLUCOSE UR STRIP-MCNC: NEGATIVE MG/DL
HCT VFR BLD AUTO: 39.9 % (ref 35–47)
HGB BLD-MCNC: 12.9 G/DL (ref 11.7–15.7)
HGB UR QL STRIP: ABNORMAL
HYALINE CASTS: 1 /LPF
IMM GRANULOCYTES # BLD: 0.1 10E3/UL
IMM GRANULOCYTES NFR BLD: 1 %
KETONES UR STRIP-MCNC: 40 MG/DL
LACTATE SERPL-SCNC: 1.5 MMOL/L (ref 0.7–2)
LEUKOCYTE ESTERASE UR QL STRIP: NEGATIVE
LYMPHOCYTES # BLD AUTO: 0.1 10E3/UL (ref 0.8–5.3)
LYMPHOCYTES NFR BLD AUTO: 1 %
MCH RBC QN AUTO: 28.4 PG (ref 26.5–33)
MCHC RBC AUTO-ENTMCNC: 32.3 G/DL (ref 31.5–36.5)
MCV RBC AUTO: 88 FL (ref 78–100)
MONOCYTES # BLD AUTO: 0.4 10E3/UL (ref 0–1.3)
MONOCYTES NFR BLD AUTO: 3 %
MUCOUS THREADS #/AREA URNS LPF: PRESENT /LPF
NEUTROPHILS # BLD AUTO: 13.1 10E3/UL (ref 1.6–8.3)
NEUTROPHILS NFR BLD AUTO: 95 %
NITRATE UR QL: NEGATIVE
NRBC # BLD AUTO: 0 10E3/UL
NRBC BLD AUTO-RTO: 0 /100
PH UR STRIP: 7 [PH] (ref 5–7)
PLATELET # BLD AUTO: 166 10E3/UL (ref 150–450)
POTASSIUM SERPL-SCNC: 4.2 MMOL/L (ref 3.4–5.3)
PROT SERPL-MCNC: 7.1 G/DL (ref 6.4–8.3)
RBC # BLD AUTO: 4.54 10E6/UL (ref 3.8–5.2)
RBC URINE: 47 /HPF
SODIUM SERPL-SCNC: 129 MMOL/L (ref 135–145)
SP GR UR STRIP: 1.03 (ref 1–1.03)
SQUAMOUS EPITHELIAL: 1 /HPF
UROBILINOGEN UR STRIP-MCNC: 3 MG/DL
WBC # BLD AUTO: 13.8 10E3/UL (ref 4–11)
WBC URINE: 2 /HPF

## 2024-07-14 PROCEDURE — 36415 COLL VENOUS BLD VENIPUNCTURE: CPT | Performed by: EMERGENCY MEDICINE

## 2024-07-14 PROCEDURE — 83605 ASSAY OF LACTIC ACID: CPT | Performed by: EMERGENCY MEDICINE

## 2024-07-14 PROCEDURE — 96374 THER/PROPH/DIAG INJ IV PUSH: CPT

## 2024-07-14 PROCEDURE — 82040 ASSAY OF SERUM ALBUMIN: CPT | Performed by: EMERGENCY MEDICINE

## 2024-07-14 PROCEDURE — 81001 URINALYSIS AUTO W/SCOPE: CPT | Performed by: EMERGENCY MEDICINE

## 2024-07-14 PROCEDURE — 87086 URINE CULTURE/COLONY COUNT: CPT | Performed by: INTERNAL MEDICINE

## 2024-07-14 PROCEDURE — 96361 HYDRATE IV INFUSION ADD-ON: CPT

## 2024-07-14 PROCEDURE — 250N000011 HC RX IP 250 OP 636: Performed by: EMERGENCY MEDICINE

## 2024-07-14 PROCEDURE — 258N000003 HC RX IP 258 OP 636: Performed by: EMERGENCY MEDICINE

## 2024-07-14 PROCEDURE — 250N000013 HC RX MED GY IP 250 OP 250 PS 637: Performed by: EMERGENCY MEDICINE

## 2024-07-14 PROCEDURE — 87040 BLOOD CULTURE FOR BACTERIA: CPT | Performed by: EMERGENCY MEDICINE

## 2024-07-14 PROCEDURE — 85652 RBC SED RATE AUTOMATED: CPT | Performed by: INTERNAL MEDICINE

## 2024-07-14 PROCEDURE — 99285 EMERGENCY DEPT VISIT HI MDM: CPT | Mod: 25

## 2024-07-14 PROCEDURE — 86140 C-REACTIVE PROTEIN: CPT | Performed by: INTERNAL MEDICINE

## 2024-07-14 PROCEDURE — 71250 CT THORAX DX C-: CPT | Mod: MG

## 2024-07-14 PROCEDURE — 85025 COMPLETE CBC W/AUTO DIFF WBC: CPT | Performed by: EMERGENCY MEDICINE

## 2024-07-14 RX ORDER — CEFTRIAXONE 2 G/1
2 INJECTION, POWDER, FOR SOLUTION INTRAMUSCULAR; INTRAVENOUS ONCE
Status: DISCONTINUED | OUTPATIENT
Start: 2024-07-15 | End: 2024-07-15

## 2024-07-14 RX ORDER — ONDANSETRON 2 MG/ML
4 INJECTION INTRAMUSCULAR; INTRAVENOUS ONCE
Status: COMPLETED | OUTPATIENT
Start: 2024-07-14 | End: 2024-07-14

## 2024-07-14 RX ORDER — ACETAMINOPHEN 500 MG
1000 TABLET ORAL ONCE
Status: COMPLETED | OUTPATIENT
Start: 2024-07-14 | End: 2024-07-14

## 2024-07-14 RX ADMIN — ONDANSETRON 4 MG: 2 INJECTION INTRAMUSCULAR; INTRAVENOUS at 23:06

## 2024-07-14 RX ADMIN — SODIUM CHLORIDE 1000 ML: 9 INJECTION, SOLUTION INTRAVENOUS at 23:09

## 2024-07-14 RX ADMIN — ACETAMINOPHEN 1000 MG: 500 TABLET, FILM COATED ORAL at 23:15

## 2024-07-14 ASSESSMENT — COLUMBIA-SUICIDE SEVERITY RATING SCALE - C-SSRS
2. HAVE YOU ACTUALLY HAD ANY THOUGHTS OF KILLING YOURSELF IN THE PAST MONTH?: NO
6. HAVE YOU EVER DONE ANYTHING, STARTED TO DO ANYTHING, OR PREPARED TO DO ANYTHING TO END YOUR LIFE?: NO
1. IN THE PAST MONTH, HAVE YOU WISHED YOU WERE DEAD OR WISHED YOU COULD GO TO SLEEP AND NOT WAKE UP?: NO

## 2024-07-14 ASSESSMENT — ACTIVITIES OF DAILY LIVING (ADL)
ADLS_ACUITY_SCORE: 36
ADLS_ACUITY_SCORE: 34

## 2024-07-15 PROBLEM — R11.2 NAUSEA AND VOMITING, UNSPECIFIED VOMITING TYPE: Status: ACTIVE | Noted: 2024-07-15

## 2024-07-15 PROBLEM — R09.02 HYPOXIA: Status: ACTIVE | Noted: 2024-07-15

## 2024-07-15 PROBLEM — J18.9 PNEUMONIA OF RIGHT LOWER LOBE DUE TO INFECTIOUS ORGANISM: Status: ACTIVE | Noted: 2024-07-15

## 2024-07-15 PROBLEM — E87.1 HYPONATREMIA: Status: ACTIVE | Noted: 2024-07-15

## 2024-07-15 LAB
ANION GAP SERPL CALCULATED.3IONS-SCNC: 10 MMOL/L (ref 7–15)
BUN SERPL-MCNC: 11.5 MG/DL (ref 8–23)
C PNEUM DNA SPEC QL NAA+PROBE: NOT DETECTED
CALCIUM SERPL-MCNC: 8.2 MG/DL (ref 8.8–10.2)
CHLORIDE SERPL-SCNC: 109 MMOL/L (ref 98–107)
CREAT SERPL-MCNC: 0.83 MG/DL (ref 0.51–0.95)
CRP SERPL-MCNC: 203.87 MG/L
CRP SERPL-MCNC: 206.94 MG/L
DEPRECATED HCO3 PLAS-SCNC: 21 MMOL/L (ref 22–29)
EGFRCR SERPLBLD CKD-EPI 2021: 77 ML/MIN/1.73M2
ERYTHROCYTE [DISTWIDTH] IN BLOOD BY AUTOMATED COUNT: 13.9 % (ref 10–15)
ERYTHROCYTE [SEDIMENTATION RATE] IN BLOOD BY WESTERGREN METHOD: 26 MM/HR (ref 0–30)
FLUAV H1 2009 PAND RNA SPEC QL NAA+PROBE: NOT DETECTED
FLUAV H1 RNA SPEC QL NAA+PROBE: NOT DETECTED
FLUAV H3 RNA SPEC QL NAA+PROBE: NOT DETECTED
FLUAV RNA SPEC QL NAA+PROBE: NOT DETECTED
FLUBV RNA SPEC QL NAA+PROBE: NOT DETECTED
GLUCOSE SERPL-MCNC: 100 MG/DL (ref 70–99)
HADV DNA SPEC QL NAA+PROBE: NOT DETECTED
HCOV PNL SPEC NAA+PROBE: NOT DETECTED
HCT VFR BLD AUTO: 36.7 % (ref 35–47)
HGB BLD-MCNC: 11.4 G/DL (ref 11.7–15.7)
HMPV RNA SPEC QL NAA+PROBE: NOT DETECTED
HPIV1 RNA SPEC QL NAA+PROBE: NOT DETECTED
HPIV2 RNA SPEC QL NAA+PROBE: NOT DETECTED
HPIV3 RNA SPEC QL NAA+PROBE: NOT DETECTED
HPIV4 RNA SPEC QL NAA+PROBE: NOT DETECTED
L PNEUMO1 AG UR QL IA: NEGATIVE
LDH SERPL L TO P-CCNC: 319 U/L (ref 0–250)
M PNEUMO DNA SPEC QL NAA+PROBE: NOT DETECTED
MCH RBC QN AUTO: 28.1 PG (ref 26.5–33)
MCHC RBC AUTO-ENTMCNC: 31.1 G/DL (ref 31.5–36.5)
MCV RBC AUTO: 90 FL (ref 78–100)
PLATELET # BLD AUTO: 136 10E3/UL (ref 150–450)
POTASSIUM SERPL-SCNC: 3.9 MMOL/L (ref 3.4–5.3)
RBC # BLD AUTO: 4.06 10E6/UL (ref 3.8–5.2)
RSV RNA SPEC QL NAA+PROBE: NOT DETECTED
RSV RNA SPEC QL NAA+PROBE: NOT DETECTED
RV+EV RNA SPEC QL NAA+PROBE: NOT DETECTED
S PNEUM AG SPEC QL: NEGATIVE
SODIUM SERPL-SCNC: 140 MMOL/L (ref 135–145)
WBC # BLD AUTO: 6.7 10E3/UL (ref 4–11)

## 2024-07-15 PROCEDURE — 87449 NOS EACH ORGANISM AG IA: CPT | Performed by: INTERNAL MEDICINE

## 2024-07-15 PROCEDURE — 86606 ASPERGILLUS ANTIBODY: CPT | Performed by: INTERNAL MEDICINE

## 2024-07-15 PROCEDURE — 86612 BLASTOMYCES ANTIBODY: CPT | Performed by: INTERNAL MEDICINE

## 2024-07-15 PROCEDURE — 120N000001 HC R&B MED SURG/OB

## 2024-07-15 PROCEDURE — 36415 COLL VENOUS BLD VENIPUNCTURE: CPT | Performed by: INTERNAL MEDICINE

## 2024-07-15 PROCEDURE — 250N000012 HC RX MED GY IP 250 OP 636 PS 637: Performed by: INTERNAL MEDICINE

## 2024-07-15 PROCEDURE — 83615 LACTATE (LD) (LDH) ENZYME: CPT | Performed by: STUDENT IN AN ORGANIZED HEALTH CARE EDUCATION/TRAINING PROGRAM

## 2024-07-15 PROCEDURE — 87385 HISTOPLASMA CAPSUL AG IA: CPT | Performed by: INTERNAL MEDICINE

## 2024-07-15 PROCEDURE — 99223 1ST HOSP IP/OBS HIGH 75: CPT | Performed by: STUDENT IN AN ORGANIZED HEALTH CARE EDUCATION/TRAINING PROGRAM

## 2024-07-15 PROCEDURE — 87798 DETECT AGENT NOS DNA AMP: CPT | Performed by: INTERNAL MEDICINE

## 2024-07-15 PROCEDURE — 82374 ASSAY BLOOD CARBON DIOXIDE: CPT | Performed by: INTERNAL MEDICINE

## 2024-07-15 PROCEDURE — 250N000011 HC RX IP 250 OP 636: Performed by: INTERNAL MEDICINE

## 2024-07-15 PROCEDURE — 99223 1ST HOSP IP/OBS HIGH 75: CPT | Mod: AI | Performed by: INTERNAL MEDICINE

## 2024-07-15 PROCEDURE — 250N000013 HC RX MED GY IP 250 OP 250 PS 637: Performed by: INTERNAL MEDICINE

## 2024-07-15 PROCEDURE — 87633 RESP VIRUS 12-25 TARGETS: CPT | Performed by: INTERNAL MEDICINE

## 2024-07-15 PROCEDURE — 250N000011 HC RX IP 250 OP 636: Performed by: EMERGENCY MEDICINE

## 2024-07-15 PROCEDURE — 86140 C-REACTIVE PROTEIN: CPT | Performed by: STUDENT IN AN ORGANIZED HEALTH CARE EDUCATION/TRAINING PROGRAM

## 2024-07-15 PROCEDURE — 258N000003 HC RX IP 258 OP 636: Performed by: INTERNAL MEDICINE

## 2024-07-15 PROCEDURE — 87581 M.PNEUMON DNA AMP PROBE: CPT | Performed by: INTERNAL MEDICINE

## 2024-07-15 PROCEDURE — 87899 AGENT NOS ASSAY W/OPTIC: CPT | Performed by: STUDENT IN AN ORGANIZED HEALTH CARE EDUCATION/TRAINING PROGRAM

## 2024-07-15 PROCEDURE — 85018 HEMOGLOBIN: CPT | Performed by: INTERNAL MEDICINE

## 2024-07-15 PROCEDURE — 99222 1ST HOSP IP/OBS MODERATE 55: CPT | Performed by: INTERNAL MEDICINE

## 2024-07-15 RX ORDER — ONDANSETRON 4 MG/1
4 TABLET, ORALLY DISINTEGRATING ORAL EVERY 6 HOURS PRN
COMMUNITY

## 2024-07-15 RX ORDER — PREDNISONE 10 MG/1
10 TABLET ORAL DAILY
Status: DISCONTINUED | OUTPATIENT
Start: 2024-07-15 | End: 2024-07-15

## 2024-07-15 RX ORDER — AMOXICILLIN 250 MG
1 CAPSULE ORAL 2 TIMES DAILY
Status: DISCONTINUED | OUTPATIENT
Start: 2024-07-15 | End: 2024-07-19 | Stop reason: HOSPADM

## 2024-07-15 RX ORDER — ATORVASTATIN CALCIUM 40 MG/1
80 TABLET, FILM COATED ORAL EVERY EVENING
Status: DISCONTINUED | OUTPATIENT
Start: 2024-07-15 | End: 2024-07-19 | Stop reason: HOSPADM

## 2024-07-15 RX ORDER — MAGNESIUM OXIDE 400 MG/1
400 TABLET ORAL DAILY
Status: DISCONTINUED | OUTPATIENT
Start: 2024-07-15 | End: 2024-07-19 | Stop reason: HOSPADM

## 2024-07-15 RX ORDER — AZITHROMYCIN 250 MG/1
250 TABLET, FILM COATED ORAL DAILY
Status: ON HOLD | COMMUNITY
Start: 2024-07-14 | End: 2024-07-19

## 2024-07-15 RX ORDER — ATORVASTATIN CALCIUM 40 MG/1
80 TABLET, FILM COATED ORAL DAILY
Status: DISCONTINUED | OUTPATIENT
Start: 2024-07-15 | End: 2024-07-15

## 2024-07-15 RX ORDER — ASPIRIN 81 MG/1
81 TABLET ORAL DAILY
Status: DISCONTINUED | OUTPATIENT
Start: 2024-07-15 | End: 2024-07-19 | Stop reason: HOSPADM

## 2024-07-15 RX ORDER — AZITHROMYCIN 250 MG/1
250 TABLET, FILM COATED ORAL DAILY
Status: COMPLETED | OUTPATIENT
Start: 2024-07-15 | End: 2024-07-18

## 2024-07-15 RX ORDER — PREDNISONE 10 MG/1
5 TABLET ORAL DAILY
Status: ON HOLD | COMMUNITY
Start: 2024-07-11 | End: 2024-07-19

## 2024-07-15 RX ORDER — AMOXICILLIN 250 MG
2 CAPSULE ORAL 2 TIMES DAILY
Status: DISCONTINUED | OUTPATIENT
Start: 2024-07-15 | End: 2024-07-19 | Stop reason: HOSPADM

## 2024-07-15 RX ORDER — AZATHIOPRINE 50 MG/1
100 TABLET ORAL DAILY
Status: ON HOLD | COMMUNITY
Start: 2024-06-26 | End: 2024-07-19

## 2024-07-15 RX ORDER — ONDANSETRON 2 MG/ML
4 INJECTION INTRAMUSCULAR; INTRAVENOUS EVERY 6 HOURS PRN
Status: DISCONTINUED | OUTPATIENT
Start: 2024-07-15 | End: 2024-07-19 | Stop reason: HOSPADM

## 2024-07-15 RX ORDER — ENOXAPARIN SODIUM 100 MG/ML
40 INJECTION SUBCUTANEOUS EVERY 24 HOURS
Status: DISCONTINUED | OUTPATIENT
Start: 2024-07-15 | End: 2024-07-19 | Stop reason: HOSPADM

## 2024-07-15 RX ORDER — LOSARTAN POTASSIUM 25 MG/1
25 TABLET ORAL DAILY
Status: DISCONTINUED | OUTPATIENT
Start: 2024-07-15 | End: 2024-07-19 | Stop reason: HOSPADM

## 2024-07-15 RX ORDER — PANTOPRAZOLE SODIUM 40 MG/1
40 TABLET, DELAYED RELEASE ORAL
Status: DISCONTINUED | OUTPATIENT
Start: 2024-07-15 | End: 2024-07-19 | Stop reason: HOSPADM

## 2024-07-15 RX ORDER — AZATHIOPRINE 50 MG/1
100 TABLET ORAL DAILY
Status: DISCONTINUED | OUTPATIENT
Start: 2024-07-15 | End: 2024-07-19 | Stop reason: HOSPADM

## 2024-07-15 RX ORDER — AZITHROMYCIN 250 MG/1
250 TABLET, FILM COATED ORAL DAILY
COMMUNITY
Start: 2024-07-14 | End: 2024-07-15

## 2024-07-15 RX ORDER — ACETAMINOPHEN 500 MG
1 TABLET ORAL DAILY
COMMUNITY
End: 2024-07-26

## 2024-07-15 RX ORDER — LIDOCAINE 40 MG/G
CREAM TOPICAL
Status: DISCONTINUED | OUTPATIENT
Start: 2024-07-15 | End: 2024-07-19 | Stop reason: HOSPADM

## 2024-07-15 RX ORDER — PIPERACILLIN SODIUM, TAZOBACTAM SODIUM 4; .5 G/20ML; G/20ML
4.5 INJECTION, POWDER, LYOPHILIZED, FOR SOLUTION INTRAVENOUS ONCE
Status: COMPLETED | OUTPATIENT
Start: 2024-07-15 | End: 2024-07-15

## 2024-07-15 RX ORDER — POLYETHYLENE GLYCOL 3350 17 G/17G
17 POWDER, FOR SOLUTION ORAL 2 TIMES DAILY PRN
Status: DISCONTINUED | OUTPATIENT
Start: 2024-07-15 | End: 2024-07-16

## 2024-07-15 RX ORDER — PIPERACILLIN SODIUM, TAZOBACTAM SODIUM 3; .375 G/15ML; G/15ML
3.38 INJECTION, POWDER, LYOPHILIZED, FOR SOLUTION INTRAVENOUS EVERY 6 HOURS
Status: DISCONTINUED | OUTPATIENT
Start: 2024-07-15 | End: 2024-07-19

## 2024-07-15 RX ORDER — CALCIUM CARBONATE 500 MG/1
1000 TABLET, CHEWABLE ORAL 4 TIMES DAILY PRN
Status: DISCONTINUED | OUTPATIENT
Start: 2024-07-15 | End: 2024-07-19 | Stop reason: HOSPADM

## 2024-07-15 RX ORDER — CARVEDILOL 3.12 MG/1
3.12 TABLET ORAL 2 TIMES DAILY
Status: DISCONTINUED | OUTPATIENT
Start: 2024-07-15 | End: 2024-07-19 | Stop reason: HOSPADM

## 2024-07-15 RX ORDER — PREDNISONE 5 MG/1
5 TABLET ORAL DAILY
Status: DISCONTINUED | OUTPATIENT
Start: 2024-07-15 | End: 2024-07-19 | Stop reason: HOSPADM

## 2024-07-15 RX ORDER — ACETAMINOPHEN 325 MG/1
975 TABLET ORAL EVERY 6 HOURS PRN
Status: DISCONTINUED | OUTPATIENT
Start: 2024-07-15 | End: 2024-07-19 | Stop reason: HOSPADM

## 2024-07-15 RX ADMIN — PREDNISONE 5 MG: 5 TABLET ORAL at 14:00

## 2024-07-15 RX ADMIN — ATORVASTATIN CALCIUM 80 MG: 40 TABLET, FILM COATED ORAL at 19:25

## 2024-07-15 RX ADMIN — ENOXAPARIN SODIUM 40 MG: 40 INJECTION SUBCUTANEOUS at 09:39

## 2024-07-15 RX ADMIN — SENNOSIDES AND DOCUSATE SODIUM 1 TABLET: 8.6; 5 TABLET ORAL at 21:17

## 2024-07-15 RX ADMIN — PIPERACILLIN AND TAZOBACTAM 3.38 G: 3; .375 INJECTION, POWDER, FOR SOLUTION INTRAVENOUS at 23:41

## 2024-07-15 RX ADMIN — PIPERACILLIN AND TAZOBACTAM 3.38 G: 3; .375 INJECTION, POWDER, FOR SOLUTION INTRAVENOUS at 06:11

## 2024-07-15 RX ADMIN — CARVEDILOL 3.12 MG: 3.12 TABLET, FILM COATED ORAL at 19:25

## 2024-07-15 RX ADMIN — SENNOSIDES AND DOCUSATE SODIUM 2 TABLET: 8.6; 5 TABLET ORAL at 09:41

## 2024-07-15 RX ADMIN — ACETAMINOPHEN 975 MG: 325 TABLET, FILM COATED ORAL at 21:17

## 2024-07-15 RX ADMIN — PIPERACILLIN AND TAZOBACTAM 4.5 G: 4; .5 INJECTION, POWDER, FOR SOLUTION INTRAVENOUS at 01:06

## 2024-07-15 RX ADMIN — ASPIRIN 81 MG: 81 TABLET, COATED ORAL at 09:43

## 2024-07-15 RX ADMIN — AZITHROMYCIN DIHYDRATE 250 MG: 250 TABLET ORAL at 09:41

## 2024-07-15 RX ADMIN — PIPERACILLIN AND TAZOBACTAM 3.38 G: 3; .375 INJECTION, POWDER, FOR SOLUTION INTRAVENOUS at 12:43

## 2024-07-15 RX ADMIN — ACETAMINOPHEN 975 MG: 325 TABLET, FILM COATED ORAL at 10:24

## 2024-07-15 RX ADMIN — SODIUM CHLORIDE 1000 ML: 9 INJECTION, SOLUTION INTRAVENOUS at 02:00

## 2024-07-15 RX ADMIN — CARVEDILOL 3.12 MG: 3.12 TABLET, FILM COATED ORAL at 09:44

## 2024-07-15 RX ADMIN — PIPERACILLIN AND TAZOBACTAM 3.38 G: 3; .375 INJECTION, POWDER, FOR SOLUTION INTRAVENOUS at 19:25

## 2024-07-15 RX ADMIN — LOSARTAN POTASSIUM 25 MG: 25 TABLET, FILM COATED ORAL at 09:41

## 2024-07-15 RX ADMIN — Medication 400 MG: at 09:43

## 2024-07-15 RX ADMIN — PANTOPRAZOLE SODIUM 40 MG: 40 TABLET, DELAYED RELEASE ORAL at 09:43

## 2024-07-15 ASSESSMENT — ACTIVITIES OF DAILY LIVING (ADL)
ADLS_ACUITY_SCORE: 35
ADLS_ACUITY_SCORE: 20
ADLS_ACUITY_SCORE: 35
ADLS_ACUITY_SCORE: 35
ADLS_ACUITY_SCORE: 20
ADLS_ACUITY_SCORE: 35
ADLS_ACUITY_SCORE: 35
ADLS_ACUITY_SCORE: 36
ADLS_ACUITY_SCORE: 35
ADLS_ACUITY_SCORE: 35
ADLS_ACUITY_SCORE: 20
ADLS_ACUITY_SCORE: 20
ADLS_ACUITY_SCORE: 35
ADLS_ACUITY_SCORE: 20
ADLS_ACUITY_SCORE: 20
ADLS_ACUITY_SCORE: 35
ADLS_ACUITY_SCORE: 20
ADLS_ACUITY_SCORE: 35

## 2024-07-15 NOTE — PLAN OF CARE
Goal Outcome Evaluation:  [facility]    ED Boarding Nurse Handoff Addendum Report:    Date/time: 7/15/2024, 4:23 PM    Activity Level: independent    Fall Risk: No    Active Infusions: No    Current Meds Due: See MAR    Current care needs: Per POC    Oxygen requirements (liters/min and/or FiO2): No    Respiratory status: Room air    Vital signs (within last 30 minutes):    Vitals:    07/15/24 1013 07/15/24 1251 07/15/24 1345 07/15/24 1604   BP:  106/48  119/52   BP Location:    Left arm   Patient Position:    Supine   Pulse:    68   Resp:       Temp: 99.9  F (37.7  C) 98.4  F (36.9  C)  98.2  F (36.8  C)   TempSrc: Oral Oral  Oral   SpO2:  95% 96% 95%   Weight:           Focused assessment within last 30 minutes:    Pt A&o x4. Tolerating R diet well. Pt's VSS. Mild c/o pain in lower abd.     ED Boarding Nurse name: Nelida Bansal RN

## 2024-07-15 NOTE — CONSULTS
[facility]    Infectious Disease Consultation     Date of Admission:  7/14/2024  Date of Consult (When I saw the patient): 07/15/24    Assessment & Plan   Bernard Dodge is a 68 year old female who was admitted on 7/14/2024.     Impression: 1 68-year-old female, acute febrile illness, no particular localizing symptoms including no major respiratory symptoms, CT chest unremarkable except for her known interstitial lung disease which is actually improved, labs unremarkable, cultures pending, very large differential diagnosis and at least modestly immunosuppressed  2 chronic interstitial lung disease, currently on azathioprine and tapering prednisone dose, significant prednisone dosing over the last few months so at risk for infections including pneumocystis although imaging and clinical symptoms not suggestive of that diagnosis  3 listed Levaquin allergy    REC 1 large differential diagnosis, [place] cabin as this occurred but really not long enough exposure for tickborne disease but nonetheless get tickborne panel, get fungal studies, Fungitell, agree with current antibiotics adjust as clinical picture directs along with cultures and labs        Elijah Millan MD    Reason for Consult   Reason for consult: I was asked to evaluate this patient for fever.    Primary Care Physician   Kumar Webber    Chief Complaint   Chills    History is obtained from the patient and medical records    History of Present Illness   Bernard Dodge is a 68 year old female who presents with acute onset of rigors on Friday into Saturday, no real localizing symptoms except for some nausea, labs and imaging fairly unremarkable slightly thrombocytopenic, was just started on azathioprine and has been getting prednisone although the dose recently was relatively low has been on as much is 40 mg of prednisone for over a month in the last 2 months without prophylaxis.  Respiratory status is about the same as no one  understood in her underlying interstitial lung disease and prior diagnosis of Boop no when she has been around's been ill although was visiting daughter at the hospital earlier last week, was in [place] in early June for 10 days without clear exposures occurring.  Was at the cabin as this occurred but is not regularly and tickborne disease endemic areas preceding this.    Past Medical History   I have reviewed this patient's medical history and updated it with pertinent information if needed.   Past Medical History:   Diagnosis Date    Benign essential hypertension     BOOP (bronchiolitis obliterans with organizing pneumonia) (H)     post radiation    Breast CA (H)     left side    CAD (coronary artery disease), native coronary artery     STEMI 8/2016  PCI mid LAD 8/22/16    DM (diabetes mellitus screen)     Dyslipidemia     Ex-smoker     Fatty liver disease, nonalcoholic     Fracture of left ankle     Ischemic cardiomyopathy     Sleep apnea     Cpap       Past Surgical History   I have reviewed this patient's surgical history and updated it with pertinent information if needed.  Past Surgical History:   Procedure Laterality Date    CV CORONARY ANGIOGRAM N/A 11/20/2023    Procedure: Coronary Angiogram;  Surgeon: Ivan Goldstein MD;  Location:  HEART CARDIAC CATH LAB    CV INSTANTANEOUS WAVE-FREE RATIO N/A 11/20/2023    Procedure: Instantaneous Wave-Free Ratio;  Surgeon: Ivan Goldstein MD;  Location:  HEART CARDIAC CATH LAB    HEART CATH, ANGIOPLASTY      STENT, CORONARY, S660 15/18  08/22/2016    MABEL=LAD       Prior to Admission Medications   Prior to Admission Medications   Prescriptions Last Dose Informant Patient Reported? Taking?   Biotin 06192 MCG TABS 7/14/2024  Yes Yes   Sig: Take 1 tablet by mouth daily   Calcium Carbonate-Vit D-Min (CALCIUM 1200) 8395-3679 MG-UNIT CHEW 7/14/2024  Yes Yes   Sig: Take 1 tablet by mouth daily   Probiotic Product (PROBIOTIC PO) 7/14/2024 Self Yes Yes   Sig:  Take 1 capsule by mouth at bedtime   aspirin 81 MG tablet 7/14/2024  No Yes   Sig: Take 1 tablet (81 mg) by mouth daily   atorvastatin (LIPITOR) 80 MG tablet 7/14/2024 at HS  No Yes   Sig: Take 1 tablet (80 mg) by mouth daily   azaTHIOprine (IMURAN) 50 MG tablet 7/14/2024  Yes Yes   Sig: Take 100 mg by mouth daily   azithromycin (ZITHROMAX) 250 MG tablet 7/14/2024 at 500mg at 0100  Yes Yes   Sig: Take 250 mg by mouth daily   carvedilol (COREG) 3.125 MG tablet 7/14/2024 at pm  No Yes   Sig: Take 1 tablet (3.125 mg) by mouth 2 times daily   losartan (COZAAR) 25 MG tablet 7/14/2024  No Yes   Sig: Take 1 tablet (25 mg) by mouth daily   magnesium oxide (MAGNESIUM OXIDE) 400 MG tablet 7/14/2024  Yes Yes   Sig: Take 400 mg by mouth at bedtime   nitroGLYcerin (NITROSTAT) 0.4 MG sublingual tablet   No Yes   Sig: Place 1 tablet (0.4 mg) under the tongue every 5 minutes as needed for chest pain if you are still having symptoms after 3 doses (15 minutes) call 911.   ondansetron (ZOFRAN ODT) 4 MG ODT tab   Yes Yes   Sig: Take 4 mg by mouth every 6 hours as needed for nausea   predniSONE (DELTASONE) 10 MG tablet 7/14/2024  Yes Yes   Sig: Take 5 mg by mouth daily   vitamin C (ASCORBIC ACID) 1000 MG TABS 7/14/2024  Yes Yes   Sig: Take 1,000 mg by mouth daily      Facility-Administered Medications: None     Allergies   Allergies   Allergen Reactions    Cellcept [Mycophenolate]     Levaquin [Levofloxacin]     Lisinopril Cough       Immunization History   Immunization History   Administered Date(s) Administered    COVID-19 12+ (2023-24) (Pfizer) 09/23/2023    COVID-19 MONOVALENT 12+ (Pfizer) 04/28/2021, 05/19/2021    COVID-19 Monovalent 12+ (Pfizer 2022) 02/18/2022       Social History   I have reviewed this patient's social history and updated it with pertinent information if needed. Bernard NORIEGA Echo  reports that she quit smoking about 23 years ago. Her smoking use included cigarettes. She started smoking about 48 years ago. She has  "a 25 pack-year smoking history. She has never used smokeless tobacco. She reports current alcohol use. She reports that she does not currently use drugs.    Family History   I have reviewed this patient's family history and updated it with pertinent information if needed.   Family History   Problem Relation Age of Onset    Hypertension Mother     Myocardial Infarction Mother     Heart Disease Father     Diabetes Brother     Heart Disease Brother        Review of Systems   The 10 point Review of Systems is negative for other localizing symptoms has some ecchymotic looking areas on her right arm but no other rashes no other localizing symptoms    Physical Exam   Temp: 98.4  F (36.9  C) Temp src: Oral BP: 106/48 Pulse: 73   Resp: 22 SpO2: 96 % O2 Device: None (Room air) Oxygen Delivery: 1.5 LPM  Vital Signs with Ranges  Temp:  [98  F (36.7  C)-100.1  F (37.8  C)] 98.4  F (36.9  C)  Pulse:  [] 73  Resp:  [18-22] 22  BP: (106-147)/(48-77) 106/48  SpO2:  [85 %-100 %] 96 %  179 lbs 7.27 oz  Body mass index is 32.82 kg/m .    GENERAL APPEARANCE:  awake looks well not tachycardic not tachypneic  EYES: Eyes grossly normal to inspection  NECK: no adenopathy  RESP: lungs clear   CV: regular rates and rhythm  LYMPHATICS: normal ant/post cervical and supraclavicular nodes  ABDOMEN: soft, nontender  MS: extremities normal  SKIN: no suspicious lesions or rashes some ecchymotic areas on the right arm        Data   All laboratory and imaging data in the past 24 hours reviewed  No results for input(s): \"CULT\" in the last 168 hours.  No lab results found.    Invalid input(s): \"UC\"       All cultures:  Recent Labs   Lab 07/14/24  2305 07/14/24 2236   CULTURE No growth after 12 hours No growth after 12 hours      Blood culture:  Results for orders placed or performed during the hospital encounter of 07/14/24   Blood Culture Hand, Left    Specimen: Hand, Left; Blood   Result Value Ref Range    Culture No growth after 12 hours  "   Blood Culture Peripheral Blood    Specimen: Peripheral Blood   Result Value Ref Range    Culture No growth after 12 hours       Urine culture:  No results found for this or any previous visit.

## 2024-07-15 NOTE — PROGRESS NOTES
Bernard Dodge is a 68 year old female with past medical history significant for cryptogenic organizing pneumonia, radiation pneumonitis, CAD s/p STEMI s/p MABEL to LAD (2016), hypertension, hyperlipidemia, type 2 diabetes mellitus, breast cancer and SHRUTHI who presented to [facility] with 1 week history of fevers, nausea and vomiting and was found to have sepsis due to possible right lower lobe pneumonia vs other. She was admitted by my colleague, Dr. Kendall, earlier this morning. Given unclear cause of infection, she was placed on broad spectrum antibiotics and ID consulted. Leukocytosis improving. Pulmonary consult also requested 2/2 . Agree with current plan. Appears clinically stable and WBC improving.    Kevin Elam MD  Hospitalist

## 2024-07-15 NOTE — H&P
[facility]  Hospitalist Admission Note  Name: Bernard Dodge    MRN: [id number]  YOB: 1955    Age: 68 year old  Date of admission: 7/14/2024  Primary care provider: Kumar Webber    Chief Complaint: Fever, vomiting    Assessment and Plan:   Sepsis, unclear source  Possible aspiration pneumonia  Possible hemorrhagic cystitis: Presenting with 3 days of fevers to 103-104  F at home and frequent vomiting for the last 2 days.  For symptoms of fever that she developed 1 day after arriving up north to [place] for vacation so she drove back.  She has not had any abdominal pain, diarrhea, dysuria, headache, neck stiffness.  She has had new urinary incontinence the last 2 days.  She has a chronic dry cough is unchanged from baseline.  She also has chronic shortness of breath secondary to  that has not really improved since being on a prolonged prednisone taper and starting Imuran for the last 1 month at the advice of her pulmonologist.  She was prescribed a Z-Giancarlo yesterday at urgent care.  COVID-19, influenza A/B, RSV PCR yesterday was negative.  No sick contacts.  In the ER she is mildly tachycardic to 100, temperature of 100.1  F, and has a leukocytosis of 13.8.  Lactic acid not elevated.  UA only shows 2 WBCs and negative LE, but she does have moderate blood and 47 RBCs and this along with new incontinence last 2 days this could represent hemorrhagic cystitis which may account also for the nausea and vomiting.  She has some mild tenderness in the left lower quadrant and some in the right lower quadrant as well which may be from constipation as she has not had any diarrhea.  Gastroenteritis remains in the differential.  CT chest shows the upper abdomen which does not appear to have any abnormalities with the gallbladder and she is nontender in this area.  CT chest shows actual improvement in the right lower lobe opacity seen a month ago and her chronic findings for .  With her vomiting I  suppose she could have aspiration pneumonia as she was reporting hypoxia down to 82% at home.  Considered PJP pneumonia given her recent long prednisone taper this last month without prophylaxis although she is not significantly hypoxic and her CT findings are not typical for this.  [place] not [place] any tick bite or rash.  -Broad-spectrum antibiotics with IV Zosyn 3.375 g every 6 hours and finish her Z-Giancarlo  -Consult infectious disease given her immunocompromise status and unclear source of infection  -Give second liter NS bolus now for sepsis  -Added on a urine culture in addition to her blood cultures  -Check CRP, ESR, procalcitonin, Fungitell  -Check full respiratory virus PCR panel  -If she develops abdominal pain or persistent fevers may need CT abdomen pelvis  -If she develops profuse diarrhea consider enteric panel, but I am starting some senna-Colace for her constipation  -Start pantoprazole daily due to all of the vomiting and being on prednisone as we will have her on Lovenox DVT prophylaxis while here    Microscopic hematuria: As above UA shows 47 RBCs and moderate blood, but only 2 WBCs and negative LE/nitrite.  She could have hemorrhagic cystitis so we will check a urine culture.  She has not had any vaginal bleeding and is postmenopausal.  -Recommend follow-up UA in clinic with PCP in a week or 2 and if she still has hematuria then she should be referred to urology for consideration of cystoscopy which I discussed with her.    Acute hypoxemic respiratory failure    SHRUTHI  History of radiation pneumonitis: She follows with pulmonologist Dr. Evans through [facility].  She has history of radiation pneumonitis in 2014.  He also has history of moderate SHRUTHI.  She has been on and off steroids since 2016 for .  She had a CT 1 month ago that showed right lower lobe infiltrate along with findings for  and she received doxycycline and was started on azathioprine now at 100 mg  daily along with a prednisone taper decreasing weekly by 10 mg starting at 40 mg.  She is now down to prednisone 10 mg daily through 7/17.  She was not put on PJP prophylaxis.  As above O2 sats were down to 82% earlier in the day.  She does report shortness of breath with exertion that has not improved this past month with her medications.  She has a chronic dry cough that is unchanged.  O2 sats did go down to 85% when sleeping here which I am not certain if it is from her sleep apnea or  or possible aspiration pneumonia as above.  -Empiric antibiotics as above  -Continuous pulse ox and wean supplemental oxygen as able  -CPAP when sleeping  -Continue her prednisone 10 mg daily and azathioprine 100 mg daily  -Given hypoxia and fevers consult pulmonology here    Hyponatremia: Sodium is low at 129 down from 133 a day ago at urgent care.  This is acut hypovolemic hyponatremia secondary to vomiting and minimal p.o. intake the last 2 days.  -Give second liter NS bolus now and repeat BMP in the morning    CAD  HTN  HLD: History LAD STEMI in 2016 requiring PCI.  Had an ischemic cardiomyopathy then that has resolved.  Not chronically on diuretics.  Most recent EF 50-55% last year.  Resume PTA she is on losartan 25 mg daily, carvedilol 3.125 mg twice daily, atorvastatin 80 mg daily, and aspirin 81 mg daily.  -Resume PTA medications    Diet controlled type II DM: A1c has been in the 6 range.  Glucose here 163.  -If pulmonology recommends increase in steroids then she will need to be started on at least a sliding scale    History of breast cancer: S/p lumpectomy and radiation.    DVT Prophylaxis: Enoxaparin (Lovenox) SQ  Code Status: Full Code  FEN: Regular diet as tolerated  Discharge Dispo: Home  Estimated Disch Date / # of Days until Disch: Admit inpatient for hypoxia and sepsis, fevers of unclear source.  Anticipate 2 or 3 night hospitalization to see if she improves on antibiotics and if we can wean her from  oxygen.      History of Present Illness:  Bernard Dodge is a 68 year old female with PMH including  on azathioprine and long prednisone taper this past 1 month, radiation pneumonitis, SHRUTHI, CAD s/p STEMI with LAD stent in 2016, HTN, HLD, diet-controlled DM, and breast cancer who presents with fevers and vomiting.  Last month she had shortness of breath and hypoxia and was started on azathioprine and a prednisone taper starting at 40 mg and decreasing by 10 mg every 7 days and is now down to 10 mg daily.  Four days ago she [place] the lake at [place] in the next day she developed a fever to 103  F.  She decided to drive back due to the fever.  For the last 2 days she has had nausea and vomiting with any p.o. intake.  She has not had any abdominal pain.  She feels constipated and has not had a bowel movement for 3 days.  She has had persistent fevers to 103-104  F.  She has a chronic dry cough that is unchanged from baseline.  Her exertional dyspnea has not really improved this past 1 month with the azathioprine and prednisone taper prescribed by her pulmonologist.  She went to urgent care at [facility] yesterday and they prescribed azathioprine.  COVID-19, influenza A/B, RSV PCR was negative then.  Her O2 sats were 82% at home today prompting her to come to the ER although she did not feel that short of breath.  She denies any dysuria, but she has had new urinary incontinence for the last 2 days that she has not experienced before.  She denies any hematuria.  She is not having any flank pain.  Denies any headache or neck stiffness.  [place] and she has not had any new rash.    History obtained from patient, medical record, and from Dr. Sifuentes in the emergency department.  Blood pressure 147/69, heart rate 100, temperature 100.1  F, oxygen is in the 90s, but when she went to sleep it was 85% so she is now on 2 L via nasal cannula.  She is a leukocytosis of  13.8, hemoglobin 12.9, platelet count 166.  Sodium is low at 129 as is chloride at 93.  Glucose is 163.  Lactic acid normal at 1.5.  UA shows only 2 WBCs and negative LE, but 47 RBCs and moderate blood.  CT shows improvement in the right lower lobe opacity along with her chronic  findings.  She received acetaminophen 1000 mg and IV Zosyn.  Admit inpatient.       Clinically Significant Risk Factors Present on Admission         # Hyponatremia: Lowest Na = 129 mmol/L in last 2 days, will monitor as appropriate        # Drug Induced Platelet Defect: home medication list includes an antiplatelet medication   # Hypertension: Noted on problem list                                  Past Medical History reviewed:  Past Medical History:   Diagnosis Date    Benign essential hypertension     BOOP (bronchiolitis obliterans with organizing pneumonia) (H)     post radiation    Breast CA (H)     left side    CAD (coronary artery disease), native coronary artery     STEMI 2016  PCI mid LAD 16    DM (diabetes mellitus screen)     Dyslipidemia     Ex-smoker     Fatty liver disease, nonalcoholic     Fracture of left ankle     Ischemic cardiomyopathy     Sleep apnea     Cpap     Past Surgical History reviewed:  Past Surgical History:   Procedure Laterality Date    CV CORONARY ANGIOGRAM N/A 2023    Procedure: Coronary Angiogram;  Surgeon: Ivan Goldstein MD;  Location: [facility] CARDIAC CATH LAB    CV INSTANTANEOUS WAVE-FREE RATIO N/A 2023    Procedure: Instantaneous Wave-Free Ratio;  Surgeon: Ivan Goldstein MD;  Location: [facility] CARDIAC CATH LAB    HEART CATH, ANGIOPLASTY      STENT, CORONARY, S660 15/18  2016    MABEL=LAD     Social History reviewed:  Social History     Tobacco Use    Smoking status: Former     Current packs/day: 0.00     Average packs/day: 1 pack/day for 25.0 years (25.0 ttl pk-yrs)     Types: Cigarettes     Start date:      Quit date:      Years since quittin.5     Smokeless tobacco: Never   Substance Use Topics    Alcohol use: Yes     Comment: 2 glasses of wine before bedtime     Social History     Social History Narrative    Not on file     Family History reviewed:  Family History   Problem Relation Age of Onset    Hypertension Mother     Myocardial Infarction Mother     Heart Disease Father     Diabetes Brother     Heart Disease Brother      Allergies:  Allergies   Allergen Reactions    Cellcept [Mycophenolate]     Levaquin [Levofloxacin]     Lisinopril Cough     Medications:  Demetrius started 7/14  Prednisone 10 mg daily on a taper that is due to stop on 7/18  Azathioprine 100 mg daily  Carvedilol 3.125 mg twice daily  Losartan 25 mg daily  Atorvastatin 80 mg daily  Aspirin 81 mg daily  Zofran as needed    Review of Systems:  A Comprehensive greater than 10 system review of systems was carried out.  Pertinent positives and negatives are noted above.  Otherwise negative.     Physical Exam:  Blood pressure 139/67, pulse 91, temperature 100.1  F (37.8  C), temperature source Oral, resp. rate 18, weight 81.4 kg (179 lb 7.3 oz), SpO2 95%, not currently breastfeeding.  Wt Readings from Last 1 Encounters:   07/14/24 81.4 kg (179 lb 7.3 oz)     Exam:  Constitutional: Awake, NAD, appears fatigued  Eyes: sclera white  HEENT: Dry mucous membranes  Respiratory: Bilateral basilar crackles, no wheeze, appears comfortable on 2 L via nasal cannula  Cardiovascular: RRR.  No murmur   GI: Soft and not distended, mild left lower quadrant tenderness to palpation, slight right lower quadrant tenderness to palpation, no upper abdominal tenderness, bowel sounds present  Skin: no rash  Musculoskeletal/extremities: atraumatic, no major deformities. No edema  Neurologic: A&O, speech clear  Psychiatric: calm, cooperative, normal affect    Lab and imaging data personally reviewed:  Labs:  Recent Labs   Lab 07/14/24  2236   WBC 13.8*   HGB 12.9   HCT 39.9   MCV 88        Recent Labs   Lab  07/14/24  2236   *   POTASSIUM 4.2   CHLORIDE 93*   CO2 23   ANIONGAP 13   *   BUN 14.5   CR 0.99*   GFRESTIMATED 62   ANTHONY 9.3   PROTTOTAL 7.1   ALBUMIN 3.8   BILITOTAL 1.0   ALKPHOS 63   AST 40   ALT 35     Recent Labs   Lab 07/14/24  2255   COLOR Yellow   APPEARANCE Slightly Cloudy*   URINEGLC Negative   URINEBILI Negative   URINEKETONE 40*   SG 1.029   UBLD Moderate*   URINEPH 7.0   PROTEIN 100*   NITRITE Negative   LEUKEST Negative   RBCU 47*   WBCU 2     Imaging:  Recent Results (from the past 24 hour(s))   Chest CT w/o contrast    Narrative    EXAM: CT CHEST W/O CONTRAST  LOCATION: [facility]  DATE: 7/14/2024    INDICATION: fever despite antibiotics, h o BOOP, recent abnormal CXR  COMPARISON: 6/11/2024  TECHNIQUE: CT chest without IV contrast. Multiplanar reformats were obtained. Dose reduction techniques were used.  CONTRAST: None.    FINDINGS:   LUNGS AND PLEURA:     Again noted, peripheral reticular opacities in both lungs consistent with lung fibrosis and/or scarring. The central airways are patent. Moderate clearing of groundglass and consolidative airspace opacity in the right lower lobe. Findings could represent   remaining postinflammatory response however residual pneumonia not excluded.    Decreasing size of 0.8 cm nodule in the periphery of the right lower lobe now measuring approximately 5.3 mm ( series 5 image 145 ). Previously noted 0.5 cm nodule in the periphery of the left lower lobe appears decreased in size measuring 3.5 mm (series   4 image 165). No new pulmonary nodules.    MEDIASTINUM/AXILLAE: No lymphadenopathy. No thoracic aortic aneurysm. Small hiatal hernia. Tiny anterior pericardial effusion.    CORONARY ARTERY CALCIFICATION: Moderate to severe    UPPER ABDOMEN: No significant finding.    MUSCULOSKELETAL: Mild to moderate thoracic spondylosis.      Impression    IMPRESSION:   1.  Moderate clearing of groundglass and consolidative airspace opacity in  the right lower lobe. Findings could represent remaining postinflammatory response however residual pneumonia not excluded.  2.  Decreasing size of bilateral lower lobe pulmonary nodules.  3.  Peripheral reticular opacities in both lungs consistent with lung fibrosis and/or scarring.         Zachary Kendall MD  Hospitalist  [facility]

## 2024-07-15 NOTE — ED TRIAGE NOTES
Pt to ER w c/o N/V, fever, SOB since yesterday. Pt states she has only been able to keep 1 piece on toast down today. Fever at home was 104. Taking tylenol appropriately. Pt actively vomiting in triage. Pt taking z-pack for cryptogenic organizing pneumonia. Pt was seen at [facility] yesterday and sent home. Negative viral swabs. VSS, ABCs intact, A&Ox4.

## 2024-07-15 NOTE — ED PROVIDER NOTES
Emergency Department Note      History of Present Illness     Chief Complaint   Nausea & Vomiting, Fever, and Shortness of Breath    HPI   Bernard Dodge is a 68 year old female with a history of hyperlipidemia, hypertension, CAD, and breast cancer presenting with nausea, vomiting, fever, and shortness of breath. Bernard has been unable to keep anything down. She endorses fever onset on Friday afternoon (7/12/24), and reports home temperatures 103-104 F. She presented to [facility] yesterday (7/13/24) with shortness of breath upon exertion. She was started on azithromycin with the most recent and only dose taken at 0100 this morning. Bernard has also been taking a  tapering dose of prednisone to treat her chronic lung problems. She is still taking the steroid. Since starting the azithromycin, she endorses vomiting and constipation. Bernard is still experiencing shortness of breath upon examination, and presented to the ED after noticing her oxygen saturation was 82% accompanied by an elevated heart rate. She took Tylenol to manage her symptoms at 0830 and 1430 today. She endorses urinating less than normal, but becoming incontinent with vomiting. Denies pain with urination, hematuria, abdominal pain, leg edema, or leg pain. Of note, her last CT scan was about 6 weeks ago.     Independent Historian   Male  present at bedside.     Review of External Notes   Office visit from 6/26/24 reviewed.  This was a pulmonology visit for follow-up of recurrent cryptogenic organizing pneumonia.From that Note:     She had a hx of radiation pneumonitis treated with steroids for months and stopped in the end of December 2014. Her original radiation induced lung injury was in the left upper lobe. She was admitted with worsening dyspnea and cough and worsening infiltrates in January 2015. Prior to that she was treated with outpatient oral antibiotics but did not improve. She was having severe fatigue and dry cough. Previously the  patient was diagnosed with ductal carcinoma of left breast and underwent lumpectomy and xrt completing xrt in mid June 2014. She developed a cough in late September and admitted in October. She was diagnosed with radiation pneumonitis at that time and started on high dose steroids. During her hospitalization in January 2015 she underwent bronchoscopy with biopsies and was diagnosed with BOOP. Also she was diagnosed with moderate SHRUTHI with AHI 18 on 12/4/14. She has been off steroids since April 2016. In August 2016 she underwent stent placement in LAD.     Past Medical History     Medical History and Problem List   BOOP  ILD  Breast CA  CAD  DM  Dyslipidemia  Hyperlipidemia   Hypertension   Ex-smoker  Fatty liver disease, nonalcoholic  Fracture of left ankle  Ischemic cardiomyopathy  Pneumonia   Sleep apnea    Medications   Aspirin 81 mg   Azathioprine   Atorvastatin   Carvedilol   Losartan   Nitroglycerin     Surgical History   Coronary angiogram   Heart cath, angioplasty   Coronary stent   Breast lumpectmy   Breast biopsy   Ankle surgery     Physical Exam     Patient Vitals for the past 24 hrs:   BP Temp Temp src Pulse Resp SpO2 Weight   07/14/24 2313 -- -- -- -- -- 93 % --   07/14/24 2305 -- 100.1  F (37.8  C) Oral -- -- -- --   07/14/24 2300 -- -- -- -- -- 93 % --   07/14/24 2230 133/77 -- -- 91 -- 94 % --   07/14/24 2127 (!) 147/69 99.3  F (37.4  C) Temporal 100 18 94 % --   07/14/24 2119 -- -- -- -- -- -- 81.4 kg (179 lb 7.3 oz)     Physical Exam  General: Adult sitting upright  Eyes: PERRL, Conjunctive within normal limits. No scleral icterus.  ENT: Moist mucous membranes, oropharynx clear.   CV: Normal S1S2, no murmur, rub or gallop. Regular rate and rhythm  Resp: Crackles at the right base, otherwise clear. Normal respiratory effort.  GI: Abdomen is soft, nontender and nondistended. No palpable masses. No rebound or guarding.  MSK: No edema. Nontender. Normal active range of motion.  Skin: Warm and dry. No  rashes or lesions or ecchymoses on visible skin.  Neuro: Alert and oriented. Responds appropriately to all questions and commands. No focal findings appreciated. Normal muscle tone.  Psych: Normal mood and affect. Pleasant.     Diagnostics     Lab Results   Labs Ordered and Resulted from Time of ED Arrival to Time of ED Departure   COMPREHENSIVE METABOLIC PANEL - Abnormal       Result Value    Sodium 129 (*)     Potassium 4.2      Carbon Dioxide (CO2) 23      Anion Gap 13      Urea Nitrogen 14.5      Creatinine 0.99 (*)     GFR Estimate 62      Calcium 9.3      Chloride 93 (*)     Glucose 163 (*)     Alkaline Phosphatase 63      AST 40      ALT 35      Protein Total 7.1      Albumin 3.8      Bilirubin Total 1.0     ROUTINE UA WITH MICROSCOPIC REFLEX TO CULTURE - Abnormal    Color Urine Yellow      Appearance Urine Slightly Cloudy (*)     Glucose Urine Negative      Bilirubin Urine Negative      Ketones Urine 40 (*)     Specific Gravity Urine 1.029      Blood Urine Moderate (*)     pH Urine 7.0      Protein Albumin Urine 100 (*)     Urobilinogen Urine 3.0 (*)     Nitrite Urine Negative      Leukocyte Esterase Urine Negative      Bacteria Urine Few (*)     Mucus Urine Present (*)     RBC Urine 47 (*)     WBC Urine 2      Squamous Epithelials Urine 1      Hyaline Casts Urine 1     CBC WITH PLATELETS AND DIFFERENTIAL - Abnormal    WBC Count 13.8 (*)     RBC Count 4.54      Hemoglobin 12.9      Hematocrit 39.9      MCV 88      MCH 28.4      MCHC 32.3      RDW 13.7      Platelet Count 166      % Neutrophils 95      % Lymphocytes 1      % Monocytes 3      % Eosinophils 0      % Basophils 0      % Immature Granulocytes 1      NRBCs per 100 WBC 0      Absolute Neutrophils 13.1 (*)     Absolute Lymphocytes 0.1 (*)     Absolute Monocytes 0.4      Absolute Eosinophils 0.0      Absolute Basophils 0.0      Absolute Immature Granulocytes 0.1      Absolute NRBCs 0.0     LACTIC ACID WHOLE BLOOD WITH 1X REPEAT IN 2 HR WHEN >2 -  Normal    Lactic Acid, Initial 1.5     BLOOD CULTURE   BLOOD CULTURE       Imaging   Chest CT w/o contrast   Final Result   IMPRESSION:    1.  Moderate clearing of groundglass and consolidative airspace opacity in the right lower lobe. Findings could represent remaining postinflammatory response however residual pneumonia not excluded.   2.  Decreasing size of bilateral lower lobe pulmonary nodules.   3.  Peripheral reticular opacities in both lungs consistent with lung fibrosis and/or scarring.             Independent Interpretation   None    ED Course      Medications Administered   Medications   sodium chloride 0.9% BOLUS 1,000 mL (0 mLs Intravenous Stopped 7/15/24 0050)   ondansetron (ZOFRAN) injection 4 mg (4 mg Intravenous $Given 7/14/24 2306)   acetaminophen (TYLENOL) tablet 1,000 mg (1,000 mg Oral $Given 7/14/24 2315)   piperacillin-tazobactam (ZOSYN) 4.5 g vial to attach to  mL bag (0 g Intravenous Stopped 7/15/24 0159)   sodium chloride 0.9% BOLUS 1,000 mL (1,000 mLs Intravenous $New Bag 7/15/24 0200)         Discussion of Management   Admitting Hospitalist, Dr. Kendall    ED Course   Past medical records, nursing notes, and vitals reviewed.  I performed an exam of the patient and obtained history, as documented above.   I reassessed the patient discussed findings.  She is currently on oxygen as per RN report while sleeping her oxygen saturations decreased to 83%.  Placed on 1 L oxygen via nasal cannula.  I discussed plan for admission.  She felt comfortable with this plan.    Optional/Additional Documentation  None    Medical Decision Making / Diagnosis       OBDULIA   Bernard Dodge is a 68 year old female with a history of BOOP and interstitial lung disease who presents emergency department with concerns for ongoing fever with nausea and vomiting.  She was seen at [facility] yesterday and diagnosed with pneumonia with symptoms of shortness of breath.  Here she is hypoxic with sleeping but  while awake maintains normal oxygen saturations and is otherwise maintaining her airway.  She is nontoxic in appearance.  She is not febrile here.  CT scan demonstrates ongoing right lower lung infiltrate suspicious for pneumonia.  Unclear if this is infectious or inflammatory or combination.  Given that she is immune suppressed, would treat with broad-spectrum antibiotic and admit for pulmonology consult and ongoing supportive care.  No clinical concerns at this time for pulmonary embolism.  She has no chest pain.  She is not tachycardic or hypotensive.  No evidence of sepsis or septic shock.  No concern for ACS or CHF in this clinical setting.  The patient is comfortable on reassessment on 1 L oxygen via nasal cannula.    Disposition   The patient was admitted to the hospital.     Diagnosis     ICD-10-CM    1. Pneumonia of right lower lobe due to infectious organism  J18.9       2. Hypoxia  R09.02       3. Nausea and vomiting, unspecified vomiting type  R11.2       4. Hyponatremia  E87.1            Scribe Disclosure:  Nisha CORDOVA, am serving as a scribe at 11:21 PM on 7/14/2024 to document services personally performed by Alena Sifuentes MD based on my observations and the provider's statements to me.        Alena Sifuentes MD  07/15/24 0609

## 2024-07-15 NOTE — ED NOTES
[facility]  ED Nurse Handoff Report    ED Chief complaint: Nausea & Vomiting, Fever, and Shortness of Breath  . ED Diagnosis:   Final diagnoses:   Pneumonia of right lower lobe due to infectious organism   Hypoxia   Nausea and vomiting, unspecified vomiting type   Hyponatremia       Allergies:   Allergies   Allergen Reactions    Cellcept [Mycophenolate]     Levaquin [Levofloxacin]     Lisinopril Cough       Code Status: Full Code    Activity level - Baseline/Home:  independent.  Activity Level - Current:   independent.   Lift room needed: No.   Bariatric: No   Needed: No   Isolation: No.   Infection: Not Applicable.     Respiratory status: Room air when awake, 1.5L NC applied when sleeping, desats into mid 80s    Vital Signs (within 30 minutes):   Vitals:    07/14/24 2313 07/14/24 2345 07/14/24 2350 07/15/24 0004   BP:    139/67   Pulse:       Resp:       Temp:       TempSrc:       SpO2: 93% (!) 85% 93% 95%   Weight:           Cardiac Rhythm:  ,      Pain level:    Patient confused: No.   Patient Falls Risk: patient and family education.   Elimination Status: Has voided     Patient Report - Initial Complaint: Pt to ER w c/o N/V, fever, SOB since yesterday. Pt states she has only been able to keep 1 piece on toast down today. Fever at home was 104. Taking tylenol appropriately. Pt actively vomiting in triage. Pt taking z-pack for cryptogenic organizing pneumonia. Pt was seen at [facility] yesterday and sent home. Negative viral swabs. VSS, ABCs intact, A&Ox4.     Focused Assessment: A&Ox4. Febrile, tylenol given; other VSS. C/O nausea, meds given. LS course crackles RLL. Denies CP.     Abnormal Results:   Labs Ordered and Resulted from Time of ED Arrival to Time of ED Departure   COMPREHENSIVE METABOLIC PANEL - Abnormal       Result Value    Sodium 129 (*)     Potassium 4.2      Carbon Dioxide (CO2) 23      Anion Gap 13      Urea Nitrogen 14.5      Creatinine 0.99 (*)     GFR Estimate  62      Calcium 9.3      Chloride 93 (*)     Glucose 163 (*)     Alkaline Phosphatase 63      AST 40      ALT 35      Protein Total 7.1      Albumin 3.8      Bilirubin Total 1.0     ROUTINE UA WITH MICROSCOPIC REFLEX TO CULTURE - Abnormal    Color Urine Yellow      Appearance Urine Slightly Cloudy (*)     Glucose Urine Negative      Bilirubin Urine Negative      Ketones Urine 40 (*)     Specific Gravity Urine 1.029      Blood Urine Moderate (*)     pH Urine 7.0      Protein Albumin Urine 100 (*)     Urobilinogen Urine 3.0 (*)     Nitrite Urine Negative      Leukocyte Esterase Urine Negative      Bacteria Urine Few (*)     Mucus Urine Present (*)     RBC Urine 47 (*)     WBC Urine 2      Squamous Epithelials Urine 1      Hyaline Casts Urine 1     CBC WITH PLATELETS AND DIFFERENTIAL - Abnormal    WBC Count 13.8 (*)     RBC Count 4.54      Hemoglobin 12.9      Hematocrit 39.9      MCV 88      MCH 28.4      MCHC 32.3      RDW 13.7      Platelet Count 166      % Neutrophils 95      % Lymphocytes 1      % Monocytes 3      % Eosinophils 0      % Basophils 0      % Immature Granulocytes 1      NRBCs per 100 WBC 0      Absolute Neutrophils 13.1 (*)     Absolute Lymphocytes 0.1 (*)     Absolute Monocytes 0.4      Absolute Eosinophils 0.0      Absolute Basophils 0.0      Absolute Immature Granulocytes 0.1      Absolute NRBCs 0.0     LACTIC ACID WHOLE BLOOD WITH 1X REPEAT IN 2 HR WHEN >2 - Normal    Lactic Acid, Initial 1.5     BLOOD CULTURE   BLOOD CULTURE        Chest CT w/o contrast   Final Result   IMPRESSION:    1.  Moderate clearing of groundglass and consolidative airspace opacity in the right lower lobe. Findings could represent remaining postinflammatory response however residual pneumonia not excluded.   2.  Decreasing size of bilateral lower lobe pulmonary nodules.   3.  Peripheral reticular opacities in both lungs consistent with lung fibrosis and/or scarring.             Treatments provided: See MAR, imaging, and  "labs  Family Comments:  with pt  OBS brochure/video discussed/provided to patient:  No  ED Medications:   Medications   sodium chloride 0.9% BOLUS 1,000 mL (1,000 mLs Intravenous $New Bag 7/14/24 2309)   cefTRIAXone (ROCEPHIN) 2 g vial to attach to  ml bag for ADULTS or NS 50 ml bag for PEDS (has no administration in time range)   ondansetron (ZOFRAN) injection 4 mg (4 mg Intravenous $Given 7/14/24 2306)   acetaminophen (TYLENOL) tablet 1,000 mg (1,000 mg Oral $Given 7/14/24 2315)       Drips infusing:  No  For the majority of the shift this patient was Green.   Interventions performed were n/a.    Sepsis treatment initiated: Yes    Per the ED Provider, Time Zero for severe sepsis or septic shock is:  2308    3 Hour Severe Sepsis Bundle Completion:  1. Initial Lactic Acid Result:   Recent Labs   Lab Test 07/14/24  2236 08/23/16  1255 08/22/16  1453   LACT 1.5 1.9 2.4*     2. Blood Cultures before Antibiotics: Yes  3. Broad Spectrum Antibiotics Administered:     Anti-infectives (From now, onward)      Start     Dose/Rate Route Frequency Ordered Stop    07/15/24 0015  piperacillin-tazobactam (ZOSYN) 4.5 g vial to attach to  mL bag        Note to Pharmacy: For [facility], [facility] and [facility]: For Zosyn-naive patients, use the \"Zosyn initial dose + extended infusion\" order panel.    4.5 g  over 30 Minutes Intravenous ONCE 07/15/24 0010            4. 1L ml of IV fluids have been given so far      6 Hour Severe Sepsis Bundle Completion:    1. Repeat Lactic Acid Level: Not drawn, first draw was 1.5, no redraw  2. Patient currently on Vasopressors =  No    Cares/treatment/interventions/medications to be completed following ED care: see in-pt orders    ED Nurse Name: Zoraida Ambrocio RN  12:08 AM    "

## 2024-07-15 NOTE — PHARMACY-ADMISSION MEDICATION HISTORY
Pharmacist Admission Medication History    Admission medication history is complete. The information provided in this note is only as accurate as the sources available at the time of the update.    Information Source(s): Patient via in-person    Changes made to PTA medication list:  Added: zofran  Deleted: vit d, bactrim  Changed: azithromycin, prednisone    Allergies reviewed with patient and updates made in EHR: yes    Medication History Completed By: Tiburcio Gordillo [facility] 7/15/2024 10:31 AM    PTA Med List   Medication Sig Note Last Dose    aspirin 81 MG tablet Take 1 tablet (81 mg) by mouth daily  7/14/2024    atorvastatin (LIPITOR) 80 MG tablet Take 1 tablet (80 mg) by mouth daily  7/14/2024 at HS    azaTHIOprine (IMURAN) 50 MG tablet Take 100 mg by mouth daily  7/14/2024    azithromycin (ZITHROMAX) 250 MG tablet Take 250 mg by mouth daily 7/15/2024: 7/15 - Zpak - only dose 500mg on 7/14 at 0100 7/14/2024 at 500mg at 0100    Biotin 81955 MCG TABS Take 1 tablet by mouth daily  7/14/2024    Calcium Carbonate-Vit D-Min (CALCIUM 1200) 6199-1297 MG-UNIT CHEW Take 1 tablet by mouth daily  7/14/2024    carvedilol (COREG) 3.125 MG tablet Take 1 tablet (3.125 mg) by mouth 2 times daily  7/14/2024 at pm    losartan (COZAAR) 25 MG tablet Take 1 tablet (25 mg) by mouth daily  7/14/2024    magnesium oxide (MAGNESIUM OXIDE) 400 MG tablet Take 400 mg by mouth at bedtime  7/14/2024    nitroGLYcerin (NITROSTAT) 0.4 MG sublingual tablet Place 1 tablet (0.4 mg) under the tongue every 5 minutes as needed for chest pain if you are still having symptoms after 3 doses (15 minutes) call 911.      ondansetron (ZOFRAN ODT) 4 MG ODT tab Take 4 mg by mouth every 6 hours as needed for nausea      predniSONE (DELTASONE) 10 MG tablet Take 5 mg by mouth daily 7/15/2024: 7/15/24 - taper to end after this Wednesday 7/14/2024    Probiotic Product (PROBIOTIC PO) Take 1 capsule by mouth at bedtime  7/14/2024    vitamin C (ASCORBIC ACID) 1000 MG  TABS Take 1,000 mg by mouth daily  7/14/2024

## 2024-07-15 NOTE — ED NOTES
Bed: ED31  Expected date:   Expected time:   Means of arrival:   Comments:  Bed is out ED 39 when admit

## 2024-07-15 NOTE — CONSULTS
[facility] Pulmonary Consult Note    Date of Service: 07/15/24    Assessment and Recommendations:  68F radiation pneumonitis, SHRUTHI, CAD s/p STEMI w/ LAD PCI, HTN, HLD, DM2, breast Ca, organizing pneumonia admitted 7/15 w/ fevers and emesis. Differential is extremely broad in this scenario given her immunosuppression. CT not classic for PJP, LDH slightly elevated. Bacterial and fungal pulmonary infection possible, she is not producing sputum for a sample, non-invasive work up pending. Viral panel negative. This may be representative of organizing pneumonia which may cause fevers and fatigue, however, I would expect more pulmonary symptoms and a lower fever. This may also represent extra-pulmonary infection. At this time, I agree with non-invasive work up per ID and continue current ABx. If she continues to have fevers and work up unrevealing, we may consider bronchoscopy. For now, will hold outpatient regimen of azathioprine and prednisone.     Pulmonary will follow peripherally while non-invasive tests are pending.     Chief Complaint   Patient presents with    Nausea & Vomiting    Fever    Shortness of Breath       Summary:  68F radiation pneumonitis, SHRUTHI, CAD s/p STEMI w/ LAD PCI, HTN, HLD, DM2, breast Ca, organizing pneumonia admitted 7/15 w/ fevers and emesis. Reported fevers to 104. CT on admission w/ ground-glass and consolidative opacities RLL. She is currently on RA. She is receiving azithromycin and pip-tazo. Pt reports 7-10 days of feeling unwell. Increased fatigue. She started developing fevers 3-4 days ago. Accompanied by N/V. No SOB at rest. Slight PIERCE w/ stairs. No chest pain or tightness. No wheezing. Chronic dry cough. No hemoptysis. No ab pain. No diarrhea. Has had constipation. No dysuria/hematuria. No new rashes or joint aches. Has had recent mild HA. No new visual changes. No neck pain. No odynophagia.     No recent sick contacts. Former smoker, quit 2000, ~35 pack year history. No  obvious exposures. Recent travel to [place] and [place], [place].      She follows w/ Dr. Saleh for pulmonary, last seen 6/2024. That visit, given prednisone taper. She is additionally on azathioprine.     10 point review of systems negative, aside from that mentioned above    /48   Pulse 73   Temp 98.4  F (36.9  C) (Oral)   Resp 22   Wt 81.4 kg (179 lb 7.3 oz)   LMP  (LMP Unknown)   SpO2 95%   BMI 32.82 kg/m    Gen: NAD  HEENT: anicteric, OP clear, Mallampati III  Card: RRR  Pulm: clear bilaterally   Abd: soft, NTND  MSK: no LE edema, no acute joint abnormalities  Skin: no obvious rash  Psych: normal affect  Neuro: answering questions appropriately     Labs: personally reviewed    Imaging: personally reviewed    Past Medical History:   Diagnosis Date    Benign essential hypertension     BOOP (bronchiolitis obliterans with organizing pneumonia) (H)     post radiation    Breast CA (H)     left side    CAD (coronary artery disease), native coronary artery     STEMI 8/2016  PCI mid LAD 8/22/16    DM (diabetes mellitus screen)     Dyslipidemia     Ex-smoker     Fatty liver disease, nonalcoholic     Fracture of left ankle     Ischemic cardiomyopathy     Sleep apnea     Cpap       Past Surgical History:   Procedure Laterality Date    CV CORONARY ANGIOGRAM N/A 11/20/2023    Procedure: Coronary Angiogram;  Surgeon: Ivan Goldstein MD;  Location:  HEART CARDI[facility] CATH LAB    CV INSTANTANEOUS WAVE-FREE RATIO N/A 11/20/2023    Procedure: Instantaneous Wave-Free Ratio;  Surgeon: Ivan Goldstein MD;  Location:  HEART CARDI[facility] CATH LAB    HEART CATH, ANGIOPLASTY      STENT, CORONARY, S660 15/18  08/22/2016    MABEL=LAD       Family History   Problem Relation Age of Onset    Hypertension Mother     Myocardial Infarction Mother     Heart Disease Father     Diabetes Brother     Heart Disease Brother        Social History     Socioeconomic History    Marital status:      Spouse name: Not on file     Number of children: Not on file    Years of education: Not on file    Highest education level: Not on file   Occupational History    Not on file   Tobacco Use    Smoking status: Former     Current packs/day: 0.00     Average packs/day: 1 pack/day for 25.0 years (25.0 ttl pk-yrs)     Types: Cigarettes     Start date:      Quit date:      Years since quittin.5    Smokeless tobacco: Never   Vaping Use    Vaping status: Never Used   Substance and Sexual Activity    Alcohol use: Yes     Comment: 2 glasses of wine before bedtime    Drug use: Not Currently    Sexual activity: Not on file   Other Topics Concern    Parent/sibling w/ CABG, MI or angioplasty before 65F 55M? Yes     Service Not Asked    Blood Transfusions Not Asked    Caffeine Concern Yes     Comment: 1-2  cup daily    Occupational Exposure Not Asked    Hobby Hazards Not Asked    Sleep Concern Not Asked    Stress Concern Not Asked    Weight Concern Not Asked    Special Diet Yes     Comment: low NA    Back Care Not Asked    Exercise Yes     Comment: heart rehab, walk    Bike Helmet Not Asked    Seat Belt Not Asked    Self-Exams Not Asked   Social History Narrative    Not on file     Social Determinants of Health     Financial Resource Strain: Not on file   Food Insecurity: Not on file   Transportation Needs: Not on file   Physical Activity: Not on file   Stress: Not on file   Social Connections: Not on file   Interpersonal Safety: Not on file   Housing Stability: Not on file       Enrique Ponce MD  Pulmonary and Critical Care Medicine  [facility]

## 2024-07-15 NOTE — PLAN OF CARE
[facility]    ED Boarding Nurse Handoff Addendum Report:    Date/time: 7/15/2024, 5:51 AM    Activity Level: independent     Fall Risk: No    Active Infusions: zosyn currently running    Current Meds Due: see MAR    Current care needs: abx, pain and nausea control, consults    Oxygen requirements (liters/min and/or FiO2): 1.5LNC while sleeping    Respiratory status: Nasal cannula    Vital signs (within last 30 minutes):    Vitals:    07/15/24 0004 07/15/24 0110 07/15/24 0126 07/15/24 0426   BP: 139/67   121/56   Pulse:    69   Resp:       Temp:   99.1  F (37.3  C) 98  F (36.7  C)   TempSrc:   Oral Oral   SpO2: 95% 94%  96%   Weight:           Focused assessment within last 30 minutes:    A&Ox4. Denies pain and nausea. Voiding on own. VSS. Respiratory panel swab sent.     ED Boarding Nurse name: Caridad Trujillo RN     RECEIVING UNIT ED HANDOFF REVIEW    Above ED Nurse Handoff Report was reviewed: Yes  Reviewed by: Vamshi Carr, RN on July 15, 2024 at 4:10 PM   [facility] called the ED to inform them the note was read: Yes

## 2024-07-16 LAB
1,3 BETA GLUCAN SER-MCNC: <31 PG/ML
A PHAGOCYTOPH DNA BLD QL NAA+PROBE: NOT DETECTED
BABESIA DNA BLD QL NAA+PROBE: NOT DETECTED
BACTERIA UR CULT: NORMAL
BASOPHILS # BLD AUTO: 0 10E3/UL (ref 0–0.2)
BASOPHILS NFR BLD AUTO: 0 %
EHRLICHIA DNA SPEC QL NAA+PROBE: NOT DETECTED
EOSINOPHIL # BLD AUTO: 0.3 10E3/UL (ref 0–0.7)
EOSINOPHIL NFR BLD AUTO: 6 %
ERYTHROCYTE [DISTWIDTH] IN BLOOD BY AUTOMATED COUNT: 14.3 % (ref 10–15)
HCT VFR BLD AUTO: 34.1 % (ref 35–47)
HGB BLD-MCNC: 10.8 G/DL (ref 11.7–15.7)
IMM GRANULOCYTES # BLD: 0 10E3/UL
IMM GRANULOCYTES NFR BLD: 1 %
LYMPHOCYTES # BLD AUTO: 0.6 10E3/UL (ref 0.8–5.3)
LYMPHOCYTES NFR BLD AUTO: 13 %
MCH RBC QN AUTO: 27.8 PG (ref 26.5–33)
MCHC RBC AUTO-ENTMCNC: 31.7 G/DL (ref 31.5–36.5)
MCV RBC AUTO: 88 FL (ref 78–100)
MONOCYTES # BLD AUTO: 0.4 10E3/UL (ref 0–1.3)
MONOCYTES NFR BLD AUTO: 9 %
NEUTROPHILS # BLD AUTO: 3.2 10E3/UL (ref 1.6–8.3)
NEUTROPHILS NFR BLD AUTO: 71 %
NRBC # BLD AUTO: 0 10E3/UL
NRBC BLD AUTO-RTO: 0 /100
OBSERVATION IMP: NEGATIVE
PLATELET # BLD AUTO: 139 10E3/UL (ref 150–450)
PROCALCITONIN SERPL IA-MCNC: 2.52 NG/ML
RBC # BLD AUTO: 3.88 10E6/UL (ref 3.8–5.2)
WBC # BLD AUTO: 4.6 10E3/UL (ref 4–11)

## 2024-07-16 PROCEDURE — 250N000011 HC RX IP 250 OP 636: Performed by: INTERNAL MEDICINE

## 2024-07-16 PROCEDURE — 85048 AUTOMATED LEUKOCYTE COUNT: CPT | Performed by: INTERNAL MEDICINE

## 2024-07-16 PROCEDURE — 84145 PROCALCITONIN (PCT): CPT | Performed by: INTERNAL MEDICINE

## 2024-07-16 PROCEDURE — 120N000001 HC R&B MED SURG/OB

## 2024-07-16 PROCEDURE — 99232 SBSQ HOSP IP/OBS MODERATE 35: CPT | Performed by: INTERNAL MEDICINE

## 2024-07-16 PROCEDURE — 36415 COLL VENOUS BLD VENIPUNCTURE: CPT | Performed by: INTERNAL MEDICINE

## 2024-07-16 PROCEDURE — 250N000013 HC RX MED GY IP 250 OP 250 PS 637: Performed by: STUDENT IN AN ORGANIZED HEALTH CARE EDUCATION/TRAINING PROGRAM

## 2024-07-16 PROCEDURE — 99232 SBSQ HOSP IP/OBS MODERATE 35: CPT | Performed by: STUDENT IN AN ORGANIZED HEALTH CARE EDUCATION/TRAINING PROGRAM

## 2024-07-16 PROCEDURE — 250N000013 HC RX MED GY IP 250 OP 250 PS 637: Performed by: INTERNAL MEDICINE

## 2024-07-16 RX ORDER — POLYETHYLENE GLYCOL 3350 17 G/17G
17 POWDER, FOR SOLUTION ORAL DAILY
Status: DISCONTINUED | OUTPATIENT
Start: 2024-07-16 | End: 2024-07-19 | Stop reason: HOSPADM

## 2024-07-16 RX ORDER — POLYETHYLENE GLYCOL 3350 17 G/17G
17 POWDER, FOR SOLUTION ORAL DAILY
Status: DISCONTINUED | OUTPATIENT
Start: 2024-07-16 | End: 2024-07-16

## 2024-07-16 RX ADMIN — PIPERACILLIN AND TAZOBACTAM 3.38 G: 3; .375 INJECTION, POWDER, FOR SOLUTION INTRAVENOUS at 05:56

## 2024-07-16 RX ADMIN — PIPERACILLIN AND TAZOBACTAM 3.38 G: 3; .375 INJECTION, POWDER, FOR SOLUTION INTRAVENOUS at 18:45

## 2024-07-16 RX ADMIN — CARVEDILOL 3.12 MG: 3.12 TABLET, FILM COATED ORAL at 08:12

## 2024-07-16 RX ADMIN — ATORVASTATIN CALCIUM 80 MG: 40 TABLET, FILM COATED ORAL at 20:04

## 2024-07-16 RX ADMIN — CARVEDILOL 3.12 MG: 3.12 TABLET, FILM COATED ORAL at 20:04

## 2024-07-16 RX ADMIN — ASPIRIN 81 MG: 81 TABLET, COATED ORAL at 08:12

## 2024-07-16 RX ADMIN — PANTOPRAZOLE SODIUM 40 MG: 40 TABLET, DELAYED RELEASE ORAL at 06:00

## 2024-07-16 RX ADMIN — PIPERACILLIN AND TAZOBACTAM 3.38 G: 3; .375 INJECTION, POWDER, FOR SOLUTION INTRAVENOUS at 12:33

## 2024-07-16 RX ADMIN — LOSARTAN POTASSIUM 25 MG: 25 TABLET, FILM COATED ORAL at 08:12

## 2024-07-16 RX ADMIN — SENNOSIDES AND DOCUSATE SODIUM 1 TABLET: 8.6; 5 TABLET ORAL at 20:04

## 2024-07-16 RX ADMIN — AZITHROMYCIN DIHYDRATE 250 MG: 250 TABLET ORAL at 08:12

## 2024-07-16 RX ADMIN — ENOXAPARIN SODIUM 40 MG: 40 INJECTION SUBCUTANEOUS at 08:19

## 2024-07-16 RX ADMIN — ACETAMINOPHEN 975 MG: 325 TABLET, FILM COATED ORAL at 20:04

## 2024-07-16 RX ADMIN — ACETAMINOPHEN 975 MG: 325 TABLET, FILM COATED ORAL at 08:11

## 2024-07-16 RX ADMIN — SENNOSIDES AND DOCUSATE SODIUM 1 TABLET: 8.6; 5 TABLET ORAL at 08:11

## 2024-07-16 RX ADMIN — PIPERACILLIN AND TAZOBACTAM 3.38 G: 3; .375 INJECTION, POWDER, FOR SOLUTION INTRAVENOUS at 23:53

## 2024-07-16 RX ADMIN — POLYETHYLENE GLYCOL 3350 17 G: 17 POWDER, FOR SOLUTION ORAL at 17:17

## 2024-07-16 RX ADMIN — Medication 400 MG: at 08:21

## 2024-07-16 ASSESSMENT — ACTIVITIES OF DAILY LIVING (ADL)
ADLS_ACUITY_SCORE: 20

## 2024-07-16 NOTE — PLAN OF CARE
/50 (BP Location: Left arm)   Pulse 75   Temp 99.6  F (37.6  C) (Oral)   Resp 18   Wt 81.4 kg (179 lb 7.3 oz)   LMP  (LMP Unknown)   SpO2 97%   BMI 32.82 kg/m      VSS, PT denies pain, SoB, CP. A&Ox4. Denies N/V. Given Prn tylenol x1 for mild fever. Continued IV abx.

## 2024-07-16 NOTE — PLAN OF CARE
"VS stable. Afebrile. No complaints of pain.     Goal Outcome Evaluation:      Plan of Care Reviewed With: patient    Overall Patient Progress: improvingOverall Patient Progress: improving    Outcome Evaluation: VS stable. Afebrile      Problem: Adult Inpatient Plan of Care  Goal: Plan of Care Review  Description: The Plan of Care Review/Shift note should be completed every shift.  The Outcome Evaluation is a brief statement about your assessment that the patient is improving, declining, or no change.  This information will be displayed automatically on your shift  note.  Outcome: Progressing  Flowsheets (Taken 7/16/2024 0423)  Outcome Evaluation: VS stable. Afebrile  Plan of Care Reviewed With: patient  Overall Patient Progress: improving  Goal: Patient-Specific Goal (Individualized)  Description: You can add care plan individualizations to a care plan. Examples of Individualization might be:  \"Parent requests to be called daily at 9am for status\", \"I have a hard time hearing out of my right ear\", or \"Do not touch me to wake me up as it startles  me\".  Outcome: Progressing  Goal: Absence of Hospital-Acquired Illness or Injury  Outcome: Progressing  Intervention: Identify and Manage Fall Risk  Recent Flowsheet Documentation  Taken 7/16/2024 0000 by Nori Li RN  Safety Promotion/Fall Prevention:   lighting adjusted   nonskid shoes/slippers when out of bed   clutter free environment maintained  Intervention: Prevent Skin Injury  Recent Flowsheet Documentation  Taken 7/16/2024 0000 by Nori Li RN  Body Position: position changed independently  Intervention: Prevent and Manage VTE (Venous Thromboembolism) Risk  Recent Flowsheet Documentation  Taken 7/16/2024 0000 by Nori Li RN  VTE Prevention/Management: patient refused intervention  Intervention: Prevent Infection  Recent Flowsheet Documentation  Taken 7/16/2024 0000 by Nori Li RN  Infection Prevention: single patient room " provided  Goal: Optimal Comfort and Wellbeing  Outcome: Progressing  Goal: Readiness for Transition of Care  Outcome: Progressing     Problem: Infection  Goal: Absence of Infection Signs and Symptoms  Outcome: Progressing

## 2024-07-16 NOTE — PLAN OF CARE
"Shift summary (0700-1930)    Pt Ox4. VSS except elevated BP on RA. PRN PO tylenol given per pt request for reported intermittent back discomfort and reports of feeling \"like my fever is coming back\" this AM. Denies SOB, CP. LPIV intact, SL. Up ad anup in room, steady gait, denies dizziness. ID following.     Goal Outcome Evaluation:      Plan of Care Reviewed With: patient    Overall Patient Progress: no changeOverall Patient Progress: no change    Outcome Evaluation: VSS on RA, afebrile. PRN Tylenol given per pt request for reported generalized \"not feeling well.\" ID and pulm following.      Problem: Adult Inpatient Plan of Care  Goal: Plan of Care Review  Description: The Plan of Care Review/Shift note should be completed every shift.  The Outcome Evaluation is a brief statement about your assessment that the patient is improving, declining, or no change.  This information will be displayed automatically on your shift  note.  Outcome: Not Progressing  Flowsheets (Taken 7/16/2024 1614)  Outcome Evaluation: VSS on RA, afebrile. PRN Tylenol given per pt request for reported generalized \"not feeling well.\" ID and pulm following.  Plan of Care Reviewed With: patient  Overall Patient Progress: no change  Goal: Patient-Specific Goal (Individualized)  Description: You can add care plan individualizations to a care plan. Examples of Individualization might be:  \"Parent requests to be called daily at 9am for status\", \"I have a hard time hearing out of my right ear\", or \"Do not touch me to wake me up as it startles  me\".  Outcome: Not Progressing  Goal: Absence of Hospital-Acquired Illness or Injury  Outcome: Not Progressing  Intervention: Identify and Manage Fall Risk  Recent Flowsheet Documentation  Taken 7/16/2024 1520 by Micheline Espinal RN  Safety Promotion/Fall Prevention: (MD at bedside) safety round/check completed  Taken 7/16/2024 1135 by Micheline Espinal RN  Safety Promotion/Fall Prevention: safety " round/check completed  Taken 7/16/2024 1037 by Micheline Esipnal RN  Safety Promotion/Fall Prevention: safety round/check completed  Taken 7/16/2024 0811 by Micheline Espinal RN  Safety Promotion/Fall Prevention:   assistive device/personal items within reach   clutter free environment maintained   increased rounding and observation   increase visualization of patient   lighting adjusted   mobility aid in reach   nonskid shoes/slippers when out of bed   patient and family education   room door open   room organization consistent   safety round/check completed   treat reversible contributory factors   treat underlying cause  Taken 7/16/2024 0721 by Micheline Espinal RN  Safety Promotion/Fall Prevention: safety round/check completed  Intervention: Prevent Skin Injury  Recent Flowsheet Documentation  Taken 7/16/2024 1235 by Micheline Espinal RN  Body Position:   position changed independently   side-lying   right  Taken 7/16/2024 0811 by Micheline Espinal RN  Body Position:   position changed independently   weight shifting   supine, head elevated  Skin Protection:   adhesive use limited   incontinence pads utilized  Device Skin Pressure Protection:   absorbent pad utilized/changed   adhesive use limited   tubing/devices free from skin contact   pressure points protected  Intervention: Prevent and Manage VTE (Venous Thromboembolism) Risk  Recent Flowsheet Documentation  Taken 7/16/2024 0810 by Micheline Espinal RN  VTE Prevention/Management: (SQ lovenox) other (see comments)  Intervention: Prevent Infection  Recent Flowsheet Documentation  Taken 7/16/2024 0811 by Micheline Espinal RN  Infection Prevention:   equipment surfaces disinfected   hand hygiene promoted   personal protective equipment utilized   rest/sleep promoted   single patient room provided  Goal: Optimal Comfort and Wellbeing  Outcome: Not Progressing  Intervention: Monitor Pain and Promote Comfort  Recent Flowsheet  Documentation  Taken 7/16/2024 0805 by Micheline Espinal, RN  Pain Management Interventions:   medication (see MAR)   repositioned  Goal: Readiness for Transition of Care  Outcome: Not Progressing

## 2024-07-16 NOTE — PROGRESS NOTES
[facility]    Medicine Progress Note - Hospitalist Service  Date of Admission: 7/14/2024     Assessment & Plan             Bernard Dodge is a 68 year old female with past medical history significant for cryptogenic organizing pneumonia, radiation pneumonitis, CAD s/p STEMI s/p MABEL to LAD (2016), hypertension, hyperlipidemia, type 2 diabetes mellitus, breast cancer and SHRUTHI who presented to [facility] with 1 week history of fevers, nausea and vomiting and was found to have sepsis due to possible right lower lobe pneumonia vs other.     Sepsis  Acute Hypoxic Respiratory Failure, resolved  Possible Right Lower Lobe Pneumonia  Hx Cryptogenic Organizing Pneumonia  Hx Radiation Pneumonitis  Complicated pulmonary history with radiation pneumonitis in 2014 and COPD since 2016.  She has been off and on steroids since that time, and recently started long prednisone taper with azathioprine.  Primary pulmonologist is Dr. Evans at [facility].  Presented on 7/15/2024 with 3-day history of fevers and vomiting, also with shortness of breath and cough.  Initially tachycardic to 100 with leukocytosis of 13.8.  CT chest with peripheral reticular opacities in both lungs consistent with lung fibrosis, also with moderate clearing of GGO and consolidative opacity in right lower lobe possibly related to postinflammatory response versus pneumonia.  Urinalysis without nitrates or leukocyte estrace, though did have microscopic hematuria.  Urine culture with mixed horacio.  Blood cultures NGTD.  Viral respiratory panel, including COVID/influenza negative.  Beta D glucan negative; histoplasma plus blastomycosis pending.  Legionella negative.  Tickborne disease panel negative.  Was started on Zosyn + azithromycin for unclear source of sepsis.  Continue to have spikes of low-grade fevers to 100/99 overnight.  White blood cell count has decreased.  Procalcitonin obtained is 2.52.  Unclear source of infection,  though does not appear to be improving.  Infectious disease and pulmonology following.    CAD w/ MABEL to LAD (2016)  Hx Ischemic Cardiomyopathy  Hypertension  Hyperlipidemia  History LAD STEMI in 2016 requiring PCI.  Had an ischemic cardiomyopathy then that has resolved.  Not chronically on diuretics.  Most recent EF 50-55% last year.  Resume PTA she is on losartan 25 mg daily, carvedilol 3.125 mg twice daily, atorvastatin 80 mg daily, and aspirin 81 mg daily.     Microscopic Hematuria  Recommend follow-up UA in clinic with PCP in a week or 2 and if she still has hematuria then she should be referred to urology for consideration of cystoscopy. This was discussed with the patient and daughter, who was present during the conversation via telephone.    SHRUTHI: CPAP  Hx Breast Cancer: S/p lumpectomy and radiation.  Constipation: Miralax daily + senna-s daily; increase regimen until daily bowel movements     Clinically Significant Risk Factors         # Hyponatremia: Lowest Na = 129 mmol/L in last 2 days, will monitor as appropriate  # Hypocalcemia: Lowest Ca = 8.2 mg/dL in last 2 days, will monitor and replace as appropriate         # Hypertension: Noted on problem list                       Diet: Orders Placed This Encounter      Combination Diet Regular Diet Adult     DVT Prophylaxis: Enoxaparin (Lovenox) SQ  Renteria: None  Code Status: Full Code    Medically Ready for Discharge: Anticipated in 2-4 Days       The patient's care was discussed with the Bedside Nurse, Patient, and Patient's Family.  I personally spent 55 minutes on chart review, documentation, coordination, and bedside management of patient.    Kevin Elam MD, MHS  Hospitalist Service  [facility]  ______________________________________________________________________    Interval History   Nursing notes reviewed. Patient feeling a bit improved. Breathing slightly easier. Supplemental O2 removed and able to ambulate around room without  "significant shortness of breath.    A full 10+ point review of systems was performed and found to be negative with the exception of those items noted here.    Physical Exam   Temp: 98.5  F (36.9  C) Temp src: Oral BP: 138/73 Pulse: 64   Resp: 18 SpO2: 97 % O2 Device: None (Room air) Oxygen Delivery: 1.5 LPM   Weight: 81.4 kg (179 lb 7.3 oz)  Estimated body mass index is 32.82 kg/m  as calculated from the following:    Height as of 12/7/23: 1.575 m (5' 2\").    Weight as of this encounter: 81.4 kg (179 lb 7.3 oz).    General: Very pleasant female resting comfortably in hospital bed.  Awake, alert, interactive. Daughter on FaceTime.  HEENT: Normocephalic, atraumatic.  PERRL, EOMI.  Conjunctiva clear, sclerae anicteric.  Mucous membranes moist.  Cardiac: Regular rate and rhythm without murmur, gallop, or rub.  No peripheral edema.  Respiratory: Normal work of breathing.  Dry, inspiratory crackles throughout (upper lobes > lower lobes)  GI: Normal, active bowel sounds.  Abdomen soft, nontender, nondistended.  : Deferred.  Musculoskeletal: Moving all extremities appropriately.  Skin: No rashes or abrasions on exposed skin.  Neurologic: Alert and oriented x4.  Cranial nerves II through XII grossly intact.  Psychologic: Appropriate mood and affect.    Data   All laboratory results and other diagnostic data from the past 24 hours is available in Epic and has been personally reviewed.    Recent Labs   Lab 07/16/24  1501 07/15/24  0722 07/14/24  2236   WBC 4.6 6.7 13.8*   HGB 10.8* 11.4* 12.9   MCV 88 90 88   * 136* 166   NA  --  140 129*   POTASSIUM  --  3.9 4.2   CHLORIDE  --  109* 93*   CO2  --  21* 23   BUN  --  11.5 14.5   CR  --  0.83 0.99*   ANIONGAP  --  10 13   ANTHONY  --  8.2* 9.3   GLC  --  100* 163*   ALBUMIN  --   --  3.8   PROTTOTAL  --   --  7.1   BILITOTAL  --   --  1.0   ALKPHOS  --   --  63   ALT  --   --  35   AST  --   --  40       Imaging results reviewed over the past 24 hrs:   No results found for " this or any previous visit (from the past 24 hour(s)).  I personally reviewed: no images or EKG's today.

## 2024-07-16 NOTE — PROGRESS NOTES
[facility]  Infectious Disease Progress Note          Assessment and Plan:   Date of Admission:  7/14/2024  Date of Consult (When I saw the patient): 07/15/24        Assessment & Plan  Bernard Dodge is a 68 year old female who was admitted on 7/14/2024.      Impression: 1 68-year-old female, acute febrile illness, no particular localizing symptoms including no major respiratory symptoms, CT chest unremarkable except for her known interstitial lung disease which is actually improved, labs unremarkable, cultures pending, very large differential diagnosis and at least modestly immunosuppressed  2 chronic interstitial lung disease, currently on azathioprine and tapering prednisone dose, significant prednisone dosing over the last few months so at risk for infections including pneumocystis although imaging and clinical symptoms not suggestive of that diagnosis  3 listed Levaquin allergy     REC 1 Still large differential diagnosis, [place] this [place] but really not long enough exposure for tickborne disease negt tickborne panel, pending  fungal studies, Fungitell, agree with current antibiotics adjust as clinical picture directs along with cultures and labs essentially treating as possible pneumonia but clinically not much for that nor is the imaging particularly impressive, cultures are negative probably stop antibiotics tomorrow  2 get procalcitonin repeat CBC differential with platelets today, continues to have fever symptoms although temp has been lower, still no actual localizing symptoms, unclear situation              Interval History:     no new complaints feels very fatigued, slight chills and definite sweats into today, temp has been under 100 but still feels feverish, no repeat labs today, cultures so far negative, CRP elevated, tickborne studies negative and did not really fit.              Medications:     Current Facility-Administered Medications   Medication Dose  Route Frequency Provider Last Rate Last Admin    aspirin EC tablet 81 mg  81 mg Oral Daily Zachary Kendall MD   81 mg at 07/16/24 0812    atorvastatin (LIPITOR) tablet 80 mg  80 mg Oral QPM Zachary Kendall MD   80 mg at 07/15/24 1925    [Held by provider] azaTHIOprine (IMURAN) tablet 100 mg  100 mg Oral Daily Zachary Kendall MD        azithromycin (ZITHROMAX) tablet 250 mg  250 mg Oral Daily Zachary Kendall MD   250 mg at 07/16/24 0812    carvedilol (COREG) tablet 3.125 mg  3.125 mg Oral BID Zachary Kendall MD   3.125 mg at 07/16/24 0812    enoxaparin ANTICOAGULANT (LOVENOX) injection 40 mg  40 mg Subcutaneous Q24H Zachary Kendall MD   40 mg at 07/16/24 0819    losartan (COZAAR) tablet 25 mg  25 mg Oral Daily Zachary Kendall MD   25 mg at 07/16/24 0812    magnesium oxide (MAG-OX) tablet 400 mg  400 mg Oral Daily Zachary Kendall MD   400 mg at 07/16/24 0821    pantoprazole (PROTONIX) EC tablet 40 mg  40 mg Oral QAM AC Zachary Kendall MD   40 mg at 07/16/24 0600    piperacillin-tazobactam (ZOSYN) 3.375 g vial to attach to  mL bag  3.375 g Intravenous Q6H Zachary Kendall  mL/hr at 07/15/24 0611 3.375 g at 07/16/24 1233    [Held by provider] predniSONE (DELTASONE) tablet 5 mg  5 mg Oral Daily Zachary Kendall MD   5 mg at 07/15/24 1400    senna-docusate (SENOKOT-S/PERICOLACE) 8.6-50 MG per tablet 1 tablet  1 tablet Oral BID Zachary Kendall MD   1 tablet at 07/16/24 0811    Or    senna-docusate (SENOKOT-S/PERICOLACE) 8.6-50 MG per tablet 2 tablet  2 tablet Oral BID Zachary Kendall MD   2 tablet at 07/15/24 0941    sodium chloride (PF) 0.9% PF flush 3 mL  3 mL Intracatheter Q8H Zachary Kendall MD   3 mL at 07/16/24 0814                  Physical Exam:   Blood pressure 118/58, pulse 64, temperature 98.5  F (36.9  C), temperature source Oral, resp. rate 18, weight 81.4 kg (179 lb 7.3 oz), SpO2 96%, not currently  "breastfeeding.  Wt Readings from Last 2 Encounters:   07/14/24 81.4 kg (179 lb 7.3 oz)   12/07/23 78.9 kg (174 lb)     Vital Signs with Ranges  Temp:  [97.9  F (36.6  C)-99.6  F (37.6  C)] 98.5  F (36.9  C)  Pulse:  [60-75] 64  Resp:  [18] 18  BP: (110-146)/(39-67) 118/58  SpO2:  [95 %-97 %] 96 %    Constitutional: Awake, alert, cooperative, no apparent distress not hypoxic respiratory rate 16, not particularly ill looking     Lungs: Clear to auscultation bilaterally, no crackles or wheezing   Cardiovascular: Regular rate and rhythm, normal S1 and S2, and no murmur noted   Abdomen: Normal bowel sounds, soft, non-distended, non-tender   Skin: No rashes, no cyanosis, no edema   Other:           Data:   All microbiology laboratory data reviewed.  Recent Labs   Lab Test 07/15/24  0722 07/14/24  2236 11/20/23  0905   WBC 6.7 13.8* 9.3   HGB 11.4* 12.9 13.8   HCT 36.7 39.9 43.8   MCV 90 88 92   * 166 209     Recent Labs   Lab Test 07/15/24  0722 07/14/24  2236 11/20/23  0905   CR 0.83 0.99* 0.91     Recent Labs   Lab Test 07/14/24 2236   SED 26     No lab results found.    Invalid input(s): \"UC\"     "

## 2024-07-17 LAB
1,3 BETA GLUCAN SER-MCNC: <31 PG/ML
ANION GAP SERPL CALCULATED.3IONS-SCNC: 12 MMOL/L (ref 7–15)
BUN SERPL-MCNC: 8 MG/DL (ref 8–23)
CALCIUM SERPL-MCNC: 8.5 MG/DL (ref 8.8–10.4)
CHLORIDE SERPL-SCNC: 106 MMOL/L (ref 98–107)
CREAT SERPL-MCNC: 0.72 MG/DL (ref 0.51–0.95)
EGFRCR SERPLBLD CKD-EPI 2021: >90 ML/MIN/1.73M2
ERYTHROCYTE [DISTWIDTH] IN BLOOD BY AUTOMATED COUNT: 14.1 % (ref 10–15)
GLUCOSE SERPL-MCNC: 93 MG/DL (ref 70–99)
H CAPSUL AG UR QL IA: NOT DETECTED
H CAPSUL AG UR-MCNC: NOT DETECTED NG/ML
HCO3 SERPL-SCNC: 21 MMOL/L (ref 22–29)
HCT VFR BLD AUTO: 36.9 % (ref 35–47)
HGB BLD-MCNC: 11.7 G/DL (ref 11.7–15.7)
MCH RBC QN AUTO: 27.9 PG (ref 26.5–33)
MCHC RBC AUTO-ENTMCNC: 31.7 G/DL (ref 31.5–36.5)
MCV RBC AUTO: 88 FL (ref 78–100)
OBSERVATION IMP: NEGATIVE
PLATELET # BLD AUTO: 152 10E3/UL (ref 150–450)
POTASSIUM SERPL-SCNC: 4 MMOL/L (ref 3.4–5.3)
RBC # BLD AUTO: 4.19 10E6/UL (ref 3.8–5.2)
SODIUM SERPL-SCNC: 139 MMOL/L (ref 135–145)
WBC # BLD AUTO: 6.6 10E3/UL (ref 4–11)

## 2024-07-17 PROCEDURE — 250N000013 HC RX MED GY IP 250 OP 250 PS 637: Performed by: STUDENT IN AN ORGANIZED HEALTH CARE EDUCATION/TRAINING PROGRAM

## 2024-07-17 PROCEDURE — 99232 SBSQ HOSP IP/OBS MODERATE 35: CPT | Performed by: STUDENT IN AN ORGANIZED HEALTH CARE EDUCATION/TRAINING PROGRAM

## 2024-07-17 PROCEDURE — 87449 NOS EACH ORGANISM AG IA: CPT | Performed by: STUDENT IN AN ORGANIZED HEALTH CARE EDUCATION/TRAINING PROGRAM

## 2024-07-17 PROCEDURE — 36415 COLL VENOUS BLD VENIPUNCTURE: CPT | Performed by: STUDENT IN AN ORGANIZED HEALTH CARE EDUCATION/TRAINING PROGRAM

## 2024-07-17 PROCEDURE — 250N000013 HC RX MED GY IP 250 OP 250 PS 637: Performed by: INTERNAL MEDICINE

## 2024-07-17 PROCEDURE — 99232 SBSQ HOSP IP/OBS MODERATE 35: CPT | Performed by: INTERNAL MEDICINE

## 2024-07-17 PROCEDURE — 80048 BASIC METABOLIC PNL TOTAL CA: CPT | Performed by: STUDENT IN AN ORGANIZED HEALTH CARE EDUCATION/TRAINING PROGRAM

## 2024-07-17 PROCEDURE — 120N000001 HC R&B MED SURG/OB

## 2024-07-17 PROCEDURE — 85048 AUTOMATED LEUKOCYTE COUNT: CPT | Performed by: STUDENT IN AN ORGANIZED HEALTH CARE EDUCATION/TRAINING PROGRAM

## 2024-07-17 PROCEDURE — 250N000011 HC RX IP 250 OP 636: Performed by: INTERNAL MEDICINE

## 2024-07-17 RX ORDER — HYDRALAZINE HYDROCHLORIDE 20 MG/ML
10 INJECTION INTRAMUSCULAR; INTRAVENOUS EVERY 6 HOURS PRN
Status: DISCONTINUED | OUTPATIENT
Start: 2024-07-17 | End: 2024-07-19 | Stop reason: HOSPADM

## 2024-07-17 RX ADMIN — PIPERACILLIN AND TAZOBACTAM 3.38 G: 3; .375 INJECTION, POWDER, FOR SOLUTION INTRAVENOUS at 12:00

## 2024-07-17 RX ADMIN — POLYETHYLENE GLYCOL 3350 17 G: 17 POWDER, FOR SOLUTION ORAL at 08:20

## 2024-07-17 RX ADMIN — CARVEDILOL 3.12 MG: 3.12 TABLET, FILM COATED ORAL at 19:49

## 2024-07-17 RX ADMIN — Medication 400 MG: at 08:21

## 2024-07-17 RX ADMIN — CARVEDILOL 3.12 MG: 3.12 TABLET, FILM COATED ORAL at 08:21

## 2024-07-17 RX ADMIN — LOSARTAN POTASSIUM 25 MG: 25 TABLET, FILM COATED ORAL at 08:21

## 2024-07-17 RX ADMIN — ATORVASTATIN CALCIUM 80 MG: 40 TABLET, FILM COATED ORAL at 19:49

## 2024-07-17 RX ADMIN — ACETAMINOPHEN 975 MG: 325 TABLET, FILM COATED ORAL at 08:22

## 2024-07-17 RX ADMIN — ASPIRIN 81 MG: 81 TABLET, COATED ORAL at 08:21

## 2024-07-17 RX ADMIN — PIPERACILLIN AND TAZOBACTAM 3.38 G: 3; .375 INJECTION, POWDER, FOR SOLUTION INTRAVENOUS at 18:00

## 2024-07-17 RX ADMIN — SENNOSIDES AND DOCUSATE SODIUM 1 TABLET: 8.6; 5 TABLET ORAL at 20:00

## 2024-07-17 RX ADMIN — ENOXAPARIN SODIUM 40 MG: 40 INJECTION SUBCUTANEOUS at 08:22

## 2024-07-17 RX ADMIN — AZITHROMYCIN DIHYDRATE 250 MG: 250 TABLET ORAL at 08:21

## 2024-07-17 RX ADMIN — SENNOSIDES AND DOCUSATE SODIUM 1 TABLET: 8.6; 5 TABLET ORAL at 08:20

## 2024-07-17 RX ADMIN — PANTOPRAZOLE SODIUM 40 MG: 40 TABLET, DELAYED RELEASE ORAL at 06:09

## 2024-07-17 RX ADMIN — PIPERACILLIN AND TAZOBACTAM 3.38 G: 3; .375 INJECTION, POWDER, FOR SOLUTION INTRAVENOUS at 06:06

## 2024-07-17 ASSESSMENT — ACTIVITIES OF DAILY LIVING (ADL)
ADLS_ACUITY_SCORE: 20
ADLS_ACUITY_SCORE: 20
ADLS_ACUITY_SCORE: 24
ADLS_ACUITY_SCORE: 20
ADLS_ACUITY_SCORE: 24
ADLS_ACUITY_SCORE: 20
ADLS_ACUITY_SCORE: 24
ADLS_ACUITY_SCORE: 20
ADLS_ACUITY_SCORE: 24
ADLS_ACUITY_SCORE: 20

## 2024-07-17 NOTE — PROGRESS NOTES
[facility]    Medicine Progress Note - Hospitalist Service  Date of Admission: 7/14/2024     Assessment & Plan             Bernard Dodge is a 68 year old female with past medical history significant for cryptogenic organizing pneumonia, radiation pneumonitis, CAD s/p STEMI s/p MABEL to LAD (2016), hypertension, hyperlipidemia, type 2 diabetes mellitus, breast cancer and SHRUTHI who presented to [facility] with 1 week history of fevers, nausea and vomiting and was found to have sepsis due to possible right lower lobe pneumonia vs atypical infection due to immunosuppression.     Sepsis  Acute Hypoxic Respiratory Failure, resolved  Possible Right Lower Lobe Pneumonia  Hx Cryptogenic Organizing Pneumonia  Hx Radiation Pneumonitis  Complicated pulmonary history with radiation pneumonitis in 2014 and COPD since 2016.  She has been off and on steroids since that time, and recently started long prednisone taper with azathioprine.  Primary pulmonologist is Dr. Evans at [facility].  Presented on 7/15/2024 with 3-day history of fevers and vomiting, also with shortness of breath and cough.  Initially tachycardic to 100 with leukocytosis of 13.8.  CT chest with peripheral reticular opacities in both lungs consistent with lung fibrosis, also with moderate clearing of GGO and consolidative opacity in right lower lobe possibly related to postinflammatory response versus pneumonia.  Urinalysis without nitrates or leukocyte estrace, though did have microscopic hematuria.  Urine culture with mixed horacio.  Blood cultures NGTD.  Viral respiratory panel, including COVID/influenza negative.  Beta D glucan negative; rechecking. Histoplasma + blastomycosis pending; also adding coccidioidosis testing as patient travelled to [place] in June.  Legionella negative.  Tickborne disease panel negative. White blood cell count has decreased.  Procalcitonin obtained is 2.52. Continuing on Zosyn + azithromycin for  unclear source of sepsis.  Continue to have spikes of low-grade fevers to 100.7 overnight w/ night sweats. Unclear source of infection, though does not appear to be improving.  Infectious disease and pulmonology following.    CAD w/ MABEL to LAD (2016)  Hx Ischemic Cardiomyopathy  Hypertension  Hyperlipidemia  History LAD STEMI in 2016 requiring PCI.  Had an ischemic cardiomyopathy then that has resolved.  Not chronically on diuretics.  Most recent EF 50-55% last year.  Resume PTA she is on losartan 25 mg daily, carvedilol 3.125 mg twice daily, atorvastatin 80 mg daily, and aspirin 81 mg daily.     Microscopic Hematuria  Recommend follow-up UA in clinic with PCP in a week or 2 and if she still has hematuria then she should be referred to urology for consideration of cystoscopy. This was discussed with the patient and daughter, who was present during the conversation via telephone.    SHRUTHI: CPAP  Hx Breast Cancer: S/p lumpectomy and radiation.  Constipation: Miralax daily + senna-s daily; increase regimen until daily bowel movements     Clinically Significant Risk Factors                  # Hypertension: Noted on problem list                       Diet: Orders Placed This Encounter      Combination Diet Regular Diet Adult     DVT Prophylaxis: Enoxaparin (Lovenox) SQ  Renteria: None  Code Status: Full Code    Medically Ready for Discharge: Anticipated in 2-4 Days       The patient's care was discussed with the Bedside Nurse, Patient, and Patient's Family.  I personally spent 55 minutes on chart review, documentation, coordination, and bedside management of patient.    Kevin Elam MD, S  Hospitalist Service  [facility]  ______________________________________________________________________    Interval History   Nursing notes reviewed. Patient feeling a bit improved. Did have night sweats and flushing overnight, but reports that has gotten better throughout the morning. Feels overall improved from  "admission, but not quite back to normal.    A full 10+ point review of systems was performed and found to be negative with the exception of those items noted here.    Physical Exam   Temp: 97.7  F (36.5  C) Temp src: Oral BP: 122/59 Pulse: 73   Resp: 16 SpO2: 95 % O2 Device: None (Room air) Oxygen Delivery: 1.5 LPM   Weight: 81.4 kg (179 lb 7.3 oz)  Estimated body mass index is 32.82 kg/m  as calculated from the following:    Height as of 12/7/23: 1.575 m (5' 2\").    Weight as of this encounter: 81.4 kg (179 lb 7.3 oz).    General: Very pleasant female resting comfortably in hospital bed.  Awake, alert, interactive. RN at bedside.  HEENT: Normocephalic, atraumatic.  PERRL, EOMI.  Conjunctiva clear, sclerae anicteric.  Mucous membranes moist.  Cardiac: Regular rate and rhythm without murmur, gallop, or rub.  No peripheral edema.  Respiratory: Normal work of breathing.  Dry, inspiratory crackles throughout (upper lobes > lower lobes)  GI: Normal, active bowel sounds.  Abdomen soft, nontender, nondistended.  : Deferred.  Musculoskeletal: Moving all extremities appropriately.  Skin: No rashes or abrasions on exposed skin.  Neurologic: Alert and oriented x4.  Cranial nerves II through XII grossly intact.  Psychologic: Appropriate mood and affect.    Data   All laboratory results and other diagnostic data from the past 24 hours is available in Epic and has been personally reviewed.    Recent Labs   Lab 07/17/24  0829 07/16/24  1501 07/15/24  0722 07/14/24  2236   WBC 6.6 4.6 6.7 13.8*   HGB 11.7 10.8* 11.4* 12.9   MCV 88 88 90 88    139* 136* 166     --  140 129*   POTASSIUM 4.0  --  3.9 4.2   CHLORIDE 106  --  109* 93*   CO2 21*  --  21* 23   BUN 8.0  --  11.5 14.5   CR 0.72  --  0.83 0.99*   ANIONGAP 12  --  10 13   ANTHONY 8.5*  --  8.2* 9.3   GLC 93  --  100* 163*   ALBUMIN  --   --   --  3.8   PROTTOTAL  --   --   --  7.1   BILITOTAL  --   --   --  1.0   ALKPHOS  --   --   --  63   ALT  --   --   --  35 "   AST  --   --   --  40       Imaging results reviewed over the past 24 hrs:   No results found for this or any previous visit (from the past 24 hour(s)).  I personally reviewed: no images or EKG's today.

## 2024-07-17 NOTE — PLAN OF CARE
"A&O. VSS. Denies pain. LS diminished. Tylenol given per request. Zosyn. Independent.     Goal Outcome Evaluation:           Overall Patient Progress: no changeOverall Patient Progress: no change    Outcome Evaluation: Temp 99.5 f and pt c/o chills- given tylenol per request      Problem: Adult Inpatient Plan of Care  Goal: Plan of Care Review  Description: The Plan of Care Review/Shift note should be completed every shift.  The Outcome Evaluation is a brief statement about your assessment that the patient is improving, declining, or no change.  This information will be displayed automatically on your shift  note.  Outcome: Progressing  Flowsheets (Taken 7/17/2024 0442)  Outcome Evaluation: Temp 99.5 f and pt c/o chills- given tylenol per request  Overall Patient Progress: no change  Goal: Patient-Specific Goal (Individualized)  Description: You can add care plan individualizations to a care plan. Examples of Individualization might be:  \"Parent requests to be called daily at 9am for status\", \"I have a hard time hearing out of my right ear\", or \"Do not touch me to wake me up as it startles  me\".  Outcome: Progressing  Goal: Absence of Hospital-Acquired Illness or Injury  Outcome: Progressing  Intervention: Identify and Manage Fall Risk  Recent Flowsheet Documentation  Taken 7/17/2024 0325 by Shayy Barcenas RN  Safety Promotion/Fall Prevention: safety round/check completed  Taken 7/17/2024 0000 by Shayy Barcenas RN  Safety Promotion/Fall Prevention: safety round/check completed  Taken 7/16/2024 2153 by Shayy Barcenas, RN  Safety Promotion/Fall Prevention: safety round/check completed  Taken 7/16/2024 2006 by Shayy Barcenas, RN  Safety Promotion/Fall Prevention:   nonskid shoes/slippers when out of bed   safety round/check completed  Intervention: Prevent Skin Injury  Recent Flowsheet Documentation  Taken 7/16/2024 2006 by Shayy Barcenas RN  Body Position: position changed independently  Intervention: " Prevent and Manage VTE (Venous Thromboembolism) Risk  Recent Flowsheet Documentation  Taken 7/16/2024 2006 by Shayy Barcenas, RN  VTE Prevention/Management: SCDs off (sequential compression devices)  Intervention: Prevent Infection  Recent Flowsheet Documentation  Taken 7/16/2024 2006 by Shayy Barcenas, RN  Infection Prevention:   rest/sleep promoted   single patient room provided   hand hygiene promoted  Goal: Optimal Comfort and Wellbeing  Outcome: Progressing  Goal: Readiness for Transition of Care  Outcome: Progressing

## 2024-07-17 NOTE — PLAN OF CARE
"Shift summary (0700-1930)    Pt Ox4. VSS except elevated BP and m-temp 100.4, improved w/ tylenol and scheduled HTN meds. Pt endorses new intermittent abdominal pain and back pain, improved w/ tylenol and up to chair. Denies CP, SOB. RPIV intact, SL. Up ad anup in room, steady gait, denies dizziness. ID following.    Goal Outcome Evaluation:      Plan of Care Reviewed With: patient    Overall Patient Progress: no changeOverall Patient Progress: no change    Outcome Evaluation: VSS on RA except elevated BP and m-temp 100.4. ID following      Problem: Adult Inpatient Plan of Care  Goal: Plan of Care Review  Description: The Plan of Care Review/Shift note should be completed every shift.  The Outcome Evaluation is a brief statement about your assessment that the patient is improving, declining, or no change.  This information will be displayed automatically on your shift  note.  Outcome: Not Progressing  Flowsheets (Taken 7/17/2024 1614)  Outcome Evaluation: VSS on RA except elevated BP and m-temp 100.4. ID following  Plan of Care Reviewed With: patient  Overall Patient Progress: no change  Goal: Patient-Specific Goal (Individualized)  Description: You can add care plan individualizations to a care plan. Examples of Individualization might be:  \"Parent requests to be called daily at 9am for status\", \"I have a hard time hearing out of my right ear\", or \"Do not touch me to wake me up as it startles  me\".  Outcome: Not Progressing  Goal: Absence of Hospital-Acquired Illness or Injury  Outcome: Not Progressing  Intervention: Identify and Manage Fall Risk  Recent Flowsheet Documentation  Taken 7/17/2024 1557 by Micheline Espinal RN  Safety Promotion/Fall Prevention: safety round/check completed  Taken 7/17/2024 1518 by Micheline Espinal RN  Safety Promotion/Fall Prevention: safety round/check completed  Taken 7/17/2024 1334 by Micheline Espinal RN  Safety Promotion/Fall Prevention: safety round/check " completed  Taken 7/17/2024 1208 by Micheline Espinal RN  Safety Promotion/Fall Prevention: (MD at bedside) safety round/check completed  Taken 7/17/2024 0826 by Micheline Espinal RN  Safety Promotion/Fall Prevention:   assistive device/personal items within reach   clutter free environment maintained   increased rounding and observation   increase visualization of patient   lighting adjusted   mobility aid in reach   nonskid shoes/slippers when out of bed   patient and family education   room door open   room organization consistent   safety round/check completed   treat reversible contributory factors   treat underlying cause  Taken 7/17/2024 0715 by Micheline Espinal RN  Safety Promotion/Fall Prevention: safety round/check completed  Intervention: Prevent Skin Injury  Recent Flowsheet Documentation  Taken 7/17/2024 1334 by Micheline Espinal RN  Body Position:   position changed independently   side-lying   left  Taken 7/17/2024 0826 by Micheline Espinal RN  Body Position:   position changed independently   supine, head elevated  Skin Protection:   adhesive use limited   incontinence pads utilized  Device Skin Pressure Protection:   absorbent pad utilized/changed   adhesive use limited   pressure points protected   tubing/devices free from skin contact  Intervention: Prevent and Manage VTE (Venous Thromboembolism) Risk  Recent Flowsheet Documentation  Taken 7/17/2024 0826 by Micheline Espinal RN  VTE Prevention/Management: (SQ lovenox) other (see comments)  Intervention: Prevent Infection  Recent Flowsheet Documentation  Taken 7/17/2024 0826 by Micheline Espinal RN  Infection Prevention:   equipment surfaces disinfected   hand hygiene promoted   personal protective equipment utilized   rest/sleep promoted   single patient room provided  Goal: Optimal Comfort and Wellbeing  Outcome: Not Progressing  Intervention: Monitor Pain and Promote Comfort  Recent Flowsheet  Documentation  Taken 7/17/2024 0817 by Micheline Espinal, RN  Pain Management Interventions: (MD informed of new intermittent sharp abd pain)   medication (see MAR)   repositioned   MD notified (comment)  Goal: Readiness for Transition of Care  Outcome: Not Progressing

## 2024-07-17 NOTE — PROGRESS NOTES
[facility]  Infectious Disease Progress Note          Assessment and Plan:   Date of Admission:  7/14/2024  Date of Consult (When I saw the patient): 07/15/24        Assessment & Plan  Bernard Dodge is a 68 year old female who was admitted on 7/14/2024.      Impression: 1 68-year-old female, acute febrile illness, no particular localizing symptoms including no major respiratory symptoms, CT chest unremarkable except for her known interstitial lung disease which is actually improved, labs unremarkable, cultures pending, very large differential diagnosis and at least modestly immunosuppressed  2 chronic interstitial lung disease, currently on azathioprine and tapering prednisone dose, significant prednisone dosing over the last few months so at risk for infections including pneumocystis although imaging and clinical symptoms not suggestive of that diagnosis  3 listed Levaquin allergy     REC 1 Still large differential diagnosis, was [place] a cabin as this occurred but  not long enough before sx onset, exposure for tickborne disease (neg tickborne panel,) pending  fungal studies, Fungitell is negative making pneumocystis very unlikely and fungal infection also unlikely,   2 procalcitonin surprisingly elevated, no localized bacterial infection unless pneumonia, so far still having low-grade fever and not feeling very well despite antibiotics and no positive microbiology, remains a fairly large differential diagnosis here and seemingly not resolving illness, would keep in the hospital for now              Interval History:     no new complaints feels very fatigued, slight chills and definite sweats , temp has been in 100 range and still feels feverish, , cultures so far negative, CRP elevated, tickborne studies negative and did not really fit.  Fungitell negative, still not have any localized symptoms.  Procalcitonin significantly elevated but no obvious localized bacterial infection site               Medications:     Current Facility-Administered Medications   Medication Dose Route Frequency Provider Last Rate Last Admin    aspirin EC tablet 81 mg  81 mg Oral Daily Zachary Kendall MD   81 mg at 07/17/24 0821    atorvastatin (LIPITOR) tablet 80 mg  80 mg Oral QPM Zachary Kendall MD   80 mg at 07/16/24 2004    [Held by provider] azaTHIOprine (IMURAN) tablet 100 mg  100 mg Oral Daily Zachary Kendall MD        azithromycin (ZITHROMAX) tablet 250 mg  250 mg Oral Daily Zachary Kendall MD   250 mg at 07/17/24 0821    carvedilol (COREG) tablet 3.125 mg  3.125 mg Oral BID Zachary Kendall MD   3.125 mg at 07/17/24 0821    enoxaparin ANTICOAGULANT (LOVENOX) injection 40 mg  40 mg Subcutaneous Q24H Zachary Kendall MD   40 mg at 07/17/24 0822    losartan (COZAAR) tablet 25 mg  25 mg Oral Daily Zachary Kendall MD   25 mg at 07/17/24 0821    magnesium oxide (MAG-OX) tablet 400 mg  400 mg Oral Daily Zachary Kendall MD   400 mg at 07/17/24 0821    pantoprazole (PROTONIX) EC tablet 40 mg  40 mg Oral QAM AC Zachary Kendall MD   40 mg at 07/17/24 0609    piperacillin-tazobactam (ZOSYN) 3.375 g vial to attach to  mL bag  3.375 g Intravenous Q6H Zachary Kendall  mL/hr at 07/16/24 2353 3.375 g at 07/17/24 1200    polyethylene glycol (MIRALAX) Packet 17 g  17 g Oral Daily Kevin Elam MD   17 g at 07/17/24 0820    [Held by provider] predniSONE (DELTASONE) tablet 5 mg  5 mg Oral Daily Zachary Kendall MD   5 mg at 07/15/24 1400    senna-docusate (SENOKOT-S/PERICOLACE) 8.6-50 MG per tablet 1 tablet  1 tablet Oral BID Zachary Kendall MD   1 tablet at 07/17/24 0820    Or    senna-docusate (SENOKOT-S/PERICOLACE) 8.6-50 MG per tablet 2 tablet  2 tablet Oral BID Zachary Kendall MD   2 tablet at 07/15/24 0941    sodium chloride (PF) 0.9% PF flush 3 mL  3 mL Intracatheter Q8H Zachary Kendall MD   3 mL at 07/17/24 0897                   "Physical Exam:   Blood pressure 122/59, pulse 73, temperature 97.7  F (36.5  C), temperature source Oral, resp. rate 16, weight 81.4 kg (179 lb 7.3 oz), SpO2 95%, not currently breastfeeding.  Wt Readings from Last 2 Encounters:   07/14/24 81.4 kg (179 lb 7.3 oz)   12/07/23 78.9 kg (174 lb)     Vital Signs with Ranges  Temp:  [97.7  F (36.5  C)-100.4  F (38  C)] 97.7  F (36.5  C)  Pulse:  [71-82] 73  Resp:  [16-18] 16  BP: (122-177)/(50-79) 122/59  SpO2:  [94 %-97 %] 95 %    Constitutional: Awake, alert, cooperative, no apparent distress not hypoxic respiratory rate 16, not particularly ill looking     Lungs: Clear to auscultation bilaterally, no crackles or wheezing   Cardiovascular: Regular rate and rhythm, normal S1 and S2, and no murmur noted   Abdomen: Normal bowel sounds, soft, non-distended, non-tender   Skin: No rashes, no cyanosis, no edema   Other:           Data:   All microbiology laboratory data reviewed.  Recent Labs   Lab Test 07/17/24  0829 07/16/24  1501 07/15/24  0722   WBC 6.6 4.6 6.7   HGB 11.7 10.8* 11.4*   HCT 36.9 34.1* 36.7   MCV 88 88 90    139* 136*     Recent Labs   Lab Test 07/17/24  0829 07/15/24  0722 07/14/24  2236   CR 0.72 0.83 0.99*     Recent Labs   Lab Test 07/14/24  2236   SED 26     No lab results found.    Invalid input(s): \"UC\"     "

## 2024-07-18 LAB
ASPERGILLUS AB TITR SER CF: NORMAL {TITER}
B DERMAT AB SER-ACNC: 0.3 IV
COCCIDIOIDES AB TITR SER CF: NORMAL {TITER}
CREAT SERPL-MCNC: 0.77 MG/DL (ref 0.51–0.95)
CRP SERPL-MCNC: 83.64 MG/L
EGFRCR SERPLBLD CKD-EPI 2021: 84 ML/MIN/1.73M2
H CAPSUL MYC AB TITR SER CF: NORMAL {TITER}
H CAPSUL YST AB TITR SER CF: NORMAL {TITER}
PLATELET # BLD AUTO: 174 10E3/UL (ref 150–450)
SCANNED LAB RESULT: NORMAL

## 2024-07-18 PROCEDURE — 86140 C-REACTIVE PROTEIN: CPT | Performed by: STUDENT IN AN ORGANIZED HEALTH CARE EDUCATION/TRAINING PROGRAM

## 2024-07-18 PROCEDURE — 82565 ASSAY OF CREATININE: CPT | Performed by: INTERNAL MEDICINE

## 2024-07-18 PROCEDURE — 120N000001 HC R&B MED SURG/OB

## 2024-07-18 PROCEDURE — 36415 COLL VENOUS BLD VENIPUNCTURE: CPT | Performed by: INTERNAL MEDICINE

## 2024-07-18 PROCEDURE — 99232 SBSQ HOSP IP/OBS MODERATE 35: CPT | Performed by: INTERNAL MEDICINE

## 2024-07-18 PROCEDURE — 250N000013 HC RX MED GY IP 250 OP 250 PS 637: Performed by: STUDENT IN AN ORGANIZED HEALTH CARE EDUCATION/TRAINING PROGRAM

## 2024-07-18 PROCEDURE — 85049 AUTOMATED PLATELET COUNT: CPT | Performed by: INTERNAL MEDICINE

## 2024-07-18 PROCEDURE — 99232 SBSQ HOSP IP/OBS MODERATE 35: CPT | Performed by: STUDENT IN AN ORGANIZED HEALTH CARE EDUCATION/TRAINING PROGRAM

## 2024-07-18 PROCEDURE — 250N000013 HC RX MED GY IP 250 OP 250 PS 637: Performed by: INTERNAL MEDICINE

## 2024-07-18 PROCEDURE — 250N000011 HC RX IP 250 OP 636: Performed by: INTERNAL MEDICINE

## 2024-07-18 RX ADMIN — PANTOPRAZOLE SODIUM 40 MG: 40 TABLET, DELAYED RELEASE ORAL at 06:25

## 2024-07-18 RX ADMIN — Medication 400 MG: at 08:49

## 2024-07-18 RX ADMIN — LOSARTAN POTASSIUM 25 MG: 25 TABLET, FILM COATED ORAL at 08:49

## 2024-07-18 RX ADMIN — PIPERACILLIN AND TAZOBACTAM 3.38 G: 3; .375 INJECTION, POWDER, FOR SOLUTION INTRAVENOUS at 11:52

## 2024-07-18 RX ADMIN — ONDANSETRON 4 MG: 2 INJECTION INTRAMUSCULAR; INTRAVENOUS at 20:30

## 2024-07-18 RX ADMIN — ASPIRIN 81 MG: 81 TABLET, COATED ORAL at 08:49

## 2024-07-18 RX ADMIN — AZITHROMYCIN DIHYDRATE 250 MG: 250 TABLET ORAL at 08:49

## 2024-07-18 RX ADMIN — SENNOSIDES AND DOCUSATE SODIUM 1 TABLET: 8.6; 5 TABLET ORAL at 08:49

## 2024-07-18 RX ADMIN — PIPERACILLIN AND TAZOBACTAM 3.38 G: 3; .375 INJECTION, POWDER, FOR SOLUTION INTRAVENOUS at 00:26

## 2024-07-18 RX ADMIN — CARVEDILOL 3.12 MG: 3.12 TABLET, FILM COATED ORAL at 20:31

## 2024-07-18 RX ADMIN — PIPERACILLIN AND TAZOBACTAM 3.38 G: 3; .375 INJECTION, POWDER, FOR SOLUTION INTRAVENOUS at 17:55

## 2024-07-18 RX ADMIN — CARVEDILOL 3.12 MG: 3.12 TABLET, FILM COATED ORAL at 08:49

## 2024-07-18 RX ADMIN — PIPERACILLIN AND TAZOBACTAM 3.38 G: 3; .375 INJECTION, POWDER, FOR SOLUTION INTRAVENOUS at 06:17

## 2024-07-18 RX ADMIN — POLYETHYLENE GLYCOL 3350 17 G: 17 POWDER, FOR SOLUTION ORAL at 08:42

## 2024-07-18 RX ADMIN — ATORVASTATIN CALCIUM 80 MG: 40 TABLET, FILM COATED ORAL at 20:31

## 2024-07-18 RX ADMIN — ENOXAPARIN SODIUM 40 MG: 40 INJECTION SUBCUTANEOUS at 08:42

## 2024-07-18 ASSESSMENT — ACTIVITIES OF DAILY LIVING (ADL)
ADLS_ACUITY_SCORE: 20

## 2024-07-18 NOTE — PLAN OF CARE
"Shift summary (0700-1930)    Pt Ox4. VSS except elevated BP on RA. Denies pain, CP, SOB. RPIV intact, SL. Up ad anup in room and halls, steady gait, denies dizziness. Potential discharge home on PO augmentin 7/19, ID following.    Goal Outcome Evaluation:      Plan of Care Reviewed With: patient    Overall Patient Progress: improvingOverall Patient Progress: improving    Outcome Evaluation: Afebrile this shift. Denies pain.      Problem: Adult Inpatient Plan of Care  Goal: Plan of Care Review  Description: The Plan of Care Review/Shift note should be completed every shift.  The Outcome Evaluation is a brief statement about your assessment that the patient is improving, declining, or no change.  This information will be displayed automatically on your shift  note.  Outcome: Progressing  Flowsheets (Taken 7/18/2024 1653)  Outcome Evaluation: Afebrile this shift. Denies pain.  Plan of Care Reviewed With: patient  Overall Patient Progress: improving  Goal: Patient-Specific Goal (Individualized)  Description: You can add care plan individualizations to a care plan. Examples of Individualization might be:  \"Parent requests to be called daily at 9am for status\", \"I have a hard time hearing out of my right ear\", or \"Do not touch me to wake me up as it startles  me\".  Outcome: Progressing  Goal: Absence of Hospital-Acquired Illness or Injury  Outcome: Progressing  Intervention: Identify and Manage Fall Risk  Recent Flowsheet Documentation  Taken 7/18/2024 1553 by Micheline Espinal RN  Safety Promotion/Fall Prevention: safety round/check completed  Taken 7/18/2024 1428 by Micheline Espinal RN  Safety Promotion/Fall Prevention: safety round/check completed  Taken 7/18/2024 1254 by Micheline Espinal RN  Safety Promotion/Fall Prevention: safety round/check completed  Taken 7/18/2024 1108 by Micheline Espinal RN  Safety Promotion/Fall Prevention: safety round/check completed  Taken 7/18/2024 1000 by Teddy" Micheline E., RN  Safety Promotion/Fall Prevention: safety round/check completed  Taken 7/18/2024 0855 by Micheline Espinal RN  Safety Promotion/Fall Prevention:   assistive device/personal items within reach   clutter free environment maintained   increased rounding and observation   increase visualization of patient   lighting adjusted   mobility aid in reach   nonskid shoes/slippers when out of bed   patient and family education   room door open   room near nurse's station   room organization consistent   safety round/check completed   treat reversible contributory factors   treat underlying cause  Taken 7/18/2024 0718 by Micheline Espinal RN  Safety Promotion/Fall Prevention: safety round/check completed  Intervention: Prevent Skin Injury  Recent Flowsheet Documentation  Taken 7/18/2024 0855 by Micheline Espinal RN  Body Position: position changed independently  Skin Protection:   adhesive use limited   incontinence pads utilized  Device Skin Pressure Protection:   absorbent pad utilized/changed   adhesive use limited   pressure points protected   tubing/devices free from skin contact  Taken 7/18/2024 0718 by Micheline Espinal RN  Body Position:   position changed independently   side-lying   right  Intervention: Prevent and Manage VTE (Venous Thromboembolism) Risk  Recent Flowsheet Documentation  Taken 7/18/2024 0855 by Micheline Espinal RN  VTE Prevention/Management: (SQ lovenox) other (see comments)  Intervention: Prevent Infection  Recent Flowsheet Documentation  Taken 7/18/2024 0855 by Micheline Espinal RN  Infection Prevention:   equipment surfaces disinfected   hand hygiene promoted   personal protective equipment utilized   rest/sleep promoted   single patient room provided  Goal: Optimal Comfort and Wellbeing  Outcome: Progressing  Goal: Readiness for Transition of Care  Outcome: Progressing

## 2024-07-18 NOTE — PLAN OF CARE
VSJAZZY NOVAK clear, assessment finding mostly WNL see flow sheets for full assement.     Goal Outcome Evaluation:      Plan of Care Reviewed With: patient    Overall Patient Progress: no change    Outcome Evaluation: VSS, LS clear    Problem: Adult Inpatient Plan of Care  Goal: Plan of Care Review  Outcome: Progressing  Flowsheets (Taken 7/17/2024 2214)  Outcome Evaluation: VSS, LS clear  Plan of Care Reviewed With: patient  Overall Patient Progress: no change  Goal: Absence of Hospital-Acquired Illness or Injury  Intervention: Identify and Manage Fall Risk  Recent Flowsheet Documentation  Taken 7/17/2024 1933 by Leilani Elliott RN  Safety Promotion/Fall Prevention:   safety round/check completed   room door open   lighting adjusted  Taken 7/17/2024 1920 by Leilani Elliott RN  Safety Promotion/Fall Prevention: safety round/check completed  Intervention: Prevent Skin Injury  Recent Flowsheet Documentation  Taken 7/17/2024 1933 by Leilani Elliott RN  Body Position: position changed independently  Skin Protection:   adhesive use limited   incontinence pads utilized  Device Skin Pressure Protection:   absorbent pad utilized/changed   adhesive use limited   pressure points protected   tubing/devices free from skin contact  Intervention: Prevent and Manage VTE (Venous Thromboembolism) Risk  Recent Flowsheet Documentation  Taken 7/17/2024 1933 by Leilani Elliott RN  VTE Prevention/Management: (Lovenox) other (see comments)  Intervention: Prevent Infection  Recent Flowsheet Documentation  Taken 7/17/2024 1933 by Leilani Elliott RN  Infection Prevention:   single patient room provided   rest/sleep promoted   hand hygiene promoted  Goal: Optimal Comfort and Wellbeing  Intervention: Monitor Pain and Promote Comfort  Recent Flowsheet Documentation  Taken 7/17/2024 1933 by Leilani Elliott RN  Pain Management Interventions:   rest   repositioned

## 2024-07-18 NOTE — PLAN OF CARE
"VSS, afebrile during the night. LS clear, GI/ WNL Skin intact except bruising.     Goal Outcome Evaluation:      Plan of Care Reviewed With: patient    Overall Patient Progress: improving    Outcome Evaluation: VSS, LS clear      Problem: Adult Inpatient Plan of Care  Goal: Plan of Care Review  Description: The Plan of Care Review/Shift note should be completed every shift.  The Outcome Evaluation is a brief statement about your assessment that the patient is improving, declining, or no change.  This information will be displayed automatically on your shift  note.  7/18/2024 0621 by Leilani Elliott RN  Outcome: Progressing  Flowsheets (Taken 7/18/2024 0621)  Outcome Evaluation: VSS, LS clear  Plan of Care Reviewed With: patient  Overall Patient Progress: improving  7/17/2024 2214 by Leilani Elliott RN  Outcome: Progressing  Flowsheets (Taken 7/17/2024 2214)  Outcome Evaluation: VSS, LS clear  Plan of Care Reviewed With: patient  Overall Patient Progress: no change  Goal: Patient-Specific Goal (Individualized)  Description: You can add care plan individualizations to a care plan. Examples of Individualization might be:  \"Parent requests to be called daily at 9am for status\", \"I have a hard time hearing out of my right ear\", or \"Do not touch me to wake me up as it startles  me\".  7/18/2024 0621 by Leilani Elliott RN  Outcome: Progressing  7/17/2024 2214 by Leilani Elliott RN  Outcome: Progressing  Goal: Absence of Hospital-Acquired Illness or Injury  7/18/2024 0621 by Leilani Elliott RN  Outcome: Progressing  7/17/2024 2214 by Leilani Elliott RN  Outcome: Progressing  Intervention: Identify and Manage Fall Risk  Recent Flowsheet Documentation  Taken 7/18/2024 0500 by Leilani Elliott RN  Safety Promotion/Fall Prevention: safety round/check completed  Taken 7/18/2024 0405 by Leilani Elliott RN  Safety Promotion/Fall Prevention: safety round/check completed  Taken 7/18/2024 0300 by Leilani Elliott, " RN  Safety Promotion/Fall Prevention: safety round/check completed  Taken 7/18/2024 0200 by Leilani Elliott RN  Safety Promotion/Fall Prevention:   safety round/check completed   room organization consistent   room near nurse's station   nonskid shoes/slippers when out of bed   clutter free environment maintained  Taken 7/18/2024 0100 by Leilani Elliott RN  Safety Promotion/Fall Prevention:   safety round/check completed   room organization consistent   room near nurse's station   nonskid shoes/slippers when out of bed   clutter free environment maintained  Taken 7/17/2024 2245 by Leilani Elliott RN  Safety Promotion/Fall Prevention:   safety round/check completed   room door open   lighting adjusted  Taken 7/17/2024 2100 by Leilani Elliott RN  Safety Promotion/Fall Prevention:   safety round/check completed   room door open   lighting adjusted  Taken 7/17/2024 1933 by Leilani Elliott RN  Safety Promotion/Fall Prevention:   safety round/check completed   room door open   lighting adjusted  Taken 7/17/2024 1920 by Leilani Elliott RN  Safety Promotion/Fall Prevention: safety round/check completed  Intervention: Prevent Skin Injury  Recent Flowsheet Documentation  Taken 7/18/2024 0100 by Leilani Elliott RN  Body Position: position changed independently  Taken 7/17/2024 1933 by Leilani Elliott RN  Body Position: position changed independently  Skin Protection:   adhesive use limited   incontinence pads utilized  Device Skin Pressure Protection:   absorbent pad utilized/changed   adhesive use limited   pressure points protected   tubing/devices free from skin contact  Intervention: Prevent and Manage VTE (Venous Thromboembolism) Risk  Recent Flowsheet Documentation  Taken 7/17/2024 1933 by Leilani Elliott RN  VTE Prevention/Management: (Lovenox) other (see comments)  Intervention: Prevent Infection  Recent Flowsheet Documentation  Taken 7/17/2024 1933 by Leilani Elliott RN  Infection Prevention:    single patient room provided   rest/sleep promoted   hand hygiene promoted  Goal: Optimal Comfort and Wellbeing  7/18/2024 0621 by Leilani Elliott RN  Outcome: Progressing  7/17/2024 2214 by Leilani Elliott RN  Outcome: Progressing  Intervention: Monitor Pain and Promote Comfort  Recent Flowsheet Documentation  Taken 7/17/2024 2354 by Leilani Elliott RN  Pain Management Interventions:   rest   repositioned  Taken 7/17/2024 1933 by Leilani Elliott RN  Pain Management Interventions:   rest   repositioned  Goal: Readiness for Transition of Care  7/18/2024 0621 by Leilani Elliott RN  Outcome: Progressing  7/17/2024 2214 by Leilani Elliott RN  Outcome: Progressing     Problem: Infection  Goal: Absence of Infection Signs and Symptoms  7/18/2024 0621 by Leilani Elliott RN  Outcome: Progressing  7/17/2024 2214 by Leilani Elliott RN  Outcome: Progressing

## 2024-07-18 NOTE — PROGRESS NOTES
[facility]  Infectious Disease Progress Note          Assessment and Plan:   Date of Admission:  7/14/2024  Date of Consult (When I saw the patient): 07/15/24        Assessment & Plan  Bernard Dodge is a 68 year old female who was admitted on 7/14/2024.      Impression: 1 68-year-old female, acute febrile illness, no particular localizing symptoms including no major respiratory symptoms, CT chest unremarkable except for her known interstitial lung disease which is actually improved, labs unremarkable, cultures pending, very large differential diagnosis and at least modestly immunosuppressed  2 chronic interstitial lung disease, currently on azathioprine and tapering prednisone dose, significant prednisone dosing over the last few months so at risk for infections including pneumocystis although imaging and clinical symptoms not suggestive of that diagnosis  3 listed Levaquin allergy     REC 1 Still large differential diagnosis, was at a cabin as this occurred but  not long enough before sx onset, exposure for tickborne disease (neg tickborne panel,) pending  fungal studies, Fungitell is negative making pneumocystis very unlikely and fungal infection also unlikely,   2 procalcitonin surprisingly elevated, no localized bacterial infection unless pneumonia, overall somewhat improved still has some minor fever symptoms but temp has been below 100.  No positive microbiology, I think probably okay here to send her home given week of oral Augmentin as though [place] covering possible pneumonia based mainly on the procalcitonin elevation  3 await pending studies, agree coccidiomycosis in the differential diagnosis but the lab test for that is generally not the antigen test (rarely +)but the antibody test which has been done and is  pending I think we can discharge without them finalized I will follow-up on those and if suggestive of fungal diagnosis will call her and pursue further  4 her pulmonologist  and primary are in the [facility] system, would probably have follow-up with both of them, need to decide what to do with the immunosuppressant drugs for the lung disease, and inferiorly with this new set of symptoms including fever consideration whether the lung disease is actually some other diagnosis besides Boop.              Interval History:     no new complaints feels very fatigued, slight chills and definite sweats , temp has been [id number] range still feels feverish slightly, , cultures so far negative, CRP elevated, tickborne studies negative and did not really fit.  Fungitell negative, still not have any localized symptoms.  Procalcitonin significantly elevated but no obvious localized bacterial infection site              Medications:     Current Facility-Administered Medications   Medication Dose Route Frequency Provider Last Rate Last Admin    aspirin EC tablet 81 mg  81 mg Oral Daily Zachary Kendall MD   81 mg at 07/18/24 0849    atorvastatin (LIPITOR) tablet 80 mg  80 mg Oral QPM Zachary Kendall MD   80 mg at 07/17/24 1949    [Held by provider] azaTHIOprine (IMURAN) tablet 100 mg  100 mg Oral Daily Zachary Kendall MD        carvedilol (COREG) tablet 3.125 mg  3.125 mg Oral BID Zachary Kendall MD   3.125 mg at 07/18/24 0849    enoxaparin ANTICOAGULANT (LOVENOX) injection 40 mg  40 mg Subcutaneous Q24H Zachary Kendall MD   40 mg at 07/18/24 0842    losartan (COZAAR) tablet 25 mg  25 mg Oral Daily Zachary Kendall MD   25 mg at 07/18/24 0849    magnesium oxide (MAG-OX) tablet 400 mg  400 mg Oral Daily Zachary Kendall MD   400 mg at 07/18/24 0849    pantoprazole (PROTONIX) EC tablet 40 mg  40 mg Oral QAM AC Zachary Kendall MD   40 mg at 07/18/24 0625    piperacillin-tazobactam (ZOSYN) 3.375 g vial to attach to  mL bag  3.375 g Intravenous Q6H Zachary Kendall  mL/hr at 07/18/24 0026 3.375 g at 07/18/24 0617    polyethylene  glycol (MIRALAX) Packet 17 g  17 g Oral Daily Kevin Elam MD   17 g at 07/18/24 0842    [Held by provider] predniSONE (DELTASONE) tablet 5 mg  5 mg Oral Daily Zachary Kendall MD   5 mg at 07/15/24 1400    senna-docusate (SENOKOT-S/PERICOLACE) 8.6-50 MG per tablet 1 tablet  1 tablet Oral BID Zachary Kendall MD   1 tablet at 07/18/24 0849    Or    senna-docusate (SENOKOT-S/PERICOLACE) 8.6-50 MG per tablet 2 tablet  2 tablet Oral BID Zachary Kendall MD   2 tablet at 07/15/24 0941    sodium chloride (PF) 0.9% PF flush 3 mL  3 mL Intracatheter Q8H Zachary Kendall MD   3 mL at 07/18/24 0852                  Physical Exam:   Blood pressure (!) 145/57, pulse 71, temperature 98.7  F (37.1  C), temperature source Oral, resp. rate 16, weight 81.4 kg (179 lb 7.3 oz), SpO2 95%, not currently breastfeeding.  Wt Readings from Last 2 Encounters:   07/14/24 81.4 kg (179 lb 7.3 oz)   12/07/23 78.9 kg (174 lb)     Vital Signs with Ranges  Temp:  [97.7  F (36.5  C)-98.9  F (37.2  C)] 98.7  F (37.1  C)  Pulse:  [62-81] 71  Resp:  [16-20] 16  BP: (122-168)/(57-66) 145/57  SpO2:  [95 %-99 %] 95 %    Constitutional: Awake, alert, cooperative, no apparent distress not hypoxic respiratory rate 16, not particularly ill looking     Lungs: Clear to auscultation bilaterally, no crackles or wheezing   Cardiovascular: Regular rate and rhythm, normal S1 and S2, and no murmur noted   Abdomen: Normal bowel sounds, soft, non-distended, non-tender   Skin: No rashes, no cyanosis, no edema   Other:           Data:   All microbiology laboratory data reviewed.  Recent Labs   Lab Test 07/18/24  0623 07/17/24  0829 07/16/24  1501 07/15/24  0722   WBC  --  6.6 4.6 6.7   HGB  --  11.7 10.8* 11.4*   HCT  --  36.9 34.1* 36.7   MCV  --  88 88 90    152 139* 136*     Recent Labs   Lab Test 07/18/24  0623 07/17/24  0829 07/15/24  0722   CR 0.77 0.72 0.83     Recent Labs   Lab Test 07/14/24  2236   SED 26     No lab results  "found.    Invalid input(s): \"UC\"     "

## 2024-07-18 NOTE — PROGRESS NOTES
[facility]    Medicine Progress Note - Hospitalist Service  Date of Admission: 7/14/2024     Assessment & Plan             Bernard Dodge is a 68 year old female with past medical history significant for cryptogenic organizing pneumonia, radiation pneumonitis, CAD s/p STEMI s/p MABEL to LAD (2016), hypertension, hyperlipidemia, type 2 diabetes mellitus, breast cancer and SHRUTHI who presented to [facility] with 1 week history of fevers, nausea and vomiting and was found to have sepsis due to possible right lower lobe pneumonia vs atypical infection due to immunosuppression.     Sepsis  Acute Hypoxic Respiratory Failure, resolved  Possible Right Lower Lobe Pneumonia  Hx Cryptogenic Organizing Pneumonia  Hx Radiation Pneumonitis  Complicated pulmonary history with radiation pneumonitis in 2014 and COPD since 2016.  She has been off and on steroids since that time, and recently started long prednisone taper with azathioprine.  Primary pulmonologist is Dr. Evans at [facility].  Presented on 7/15/2024 with 3-day history of fevers and vomiting, also with shortness of breath and cough.  Initially tachycardic to 100 with leukocytosis of 13.8.  CT chest with peripheral reticular opacities in both lungs consistent with lung fibrosis, also with moderate clearing of GGO and consolidative opacity in right lower lobe possibly related to postinflammatory response versus pneumonia.  Urinalysis without nitrates or leukocyte estrace, though did have microscopic hematuria.  Urine culture with mixed horacio.  Blood cultures NGTD.  Viral respiratory panel, including COVID/influenza negative.  Beta D glucan negative; rechecking. Histoplasma negative; blastomycosis pending; also adding coccidioidosis testing as patient travelled to [place] in June.  Legionella negative.  Tickborne disease panel negative. White blood cell count has decreased.  Procalcitonin obtained is 2.52. Continuing on Zosyn + azithromycin  for unclear source of sepsis.  Despite no localizing source of infection, patient clinically improving; able to wean from supplemental O2, overall feeling slightly improved, and fever spikes becoming more infrequent and not as high. Still having night sweats, which may simply need some more time to resolve. Will monitor patient one more night with plan for probable discharge tomorrow on augmentin, as per infectious disease note. Infectious disease and pulmonology following.    CAD w/ MABEL to LAD (2016)  Hx Ischemic Cardiomyopathy  Hypertension  Hyperlipidemia  History LAD STEMI in 2016 requiring PCI.  Had an ischemic cardiomyopathy then that has resolved.  Not chronically on diuretics.  Most recent EF 50-55% last year.  Resume PTA she is on losartan 25 mg daily, carvedilol 3.125 mg twice daily, atorvastatin 80 mg daily, and aspirin 81 mg daily.     Microscopic Hematuria  Recommend follow-up UA in clinic with PCP in a week or 2 and if she still has hematuria then she should be referred to urology for consideration of cystoscopy. This was discussed with the patient and daughter, who was present during the conversation via telephone.    SHRUTHI: CPAP  Hx Breast Cancer: S/p lumpectomy and radiation.  Constipation: Miralax daily + senna-s daily; increase regimen until daily bowel movements     Clinically Significant Risk Factors                  # Hypertension: Noted on problem list                       Diet: Orders Placed This Encounter      Combination Diet Regular Diet Adult     DVT Prophylaxis: Enoxaparin (Lovenox) SQ  Renteria: None  Code Status: Full Code    Medically Ready for Discharge: Anticipated Tomorrow       The patient's care was discussed with the Bedside Nurse, Patient, and Patient's Family.  I personally spent 55 minutes on chart review, documentation, coordination, and bedside management of patient.    Kevin Elam MD, MHS  Hospitalist Service  [facility]  "Hospital  ______________________________________________________________________    Interval History   Nursing notes reviewed. Patient feeling overall as if she has more energy. Still with night sweats. Did not experience fever episode this morning.    A full 10+ point review of systems was performed and found to be negative with the exception of those items noted here.    Physical Exam   Temp: 98.9  F (37.2  C) Temp src: Oral BP: (!) 140/62 Pulse: 74   Resp: 18 SpO2: 94 % O2 Device: None (Room air) Oxygen Delivery: 1.5 LPM   Weight: 81.4 kg (179 lb 7.3 oz)  Estimated body mass index is 32.82 kg/m  as calculated from the following:    Height as of 12/7/23: 1.575 m (5' 2\").    Weight as of this encounter: 81.4 kg (179 lb 7.3 oz).    General: Very pleasant female resting comfortably in hospital bed.  Awake, alert, interactive. RN at bedside. Daughter at bedside.  HEENT: Normocephalic, atraumatic.  PERRL, EOMI.  Conjunctiva clear, sclerae anicteric.  Mucous membranes moist.  Respiratory: Normal work of breathing ORA.   Musculoskeletal: Moving all extremities appropriately.  Skin: No rashes or abrasions on exposed skin.  Neurologic: Alert and oriented x4.  Cranial nerves II through XII grossly intact.  Psychologic: Appropriate mood and affect.    Data   All laboratory results and other diagnostic data from the past 24 hours is available in Epic and has been personally reviewed.    Recent Labs   Lab 07/18/24  0623 07/17/24  0829 07/16/24  1501 07/15/24  0722 07/14/24  2236   WBC  --  6.6 4.6 6.7 13.8*   HGB  --  11.7 10.8* 11.4* 12.9   MCV  --  88 88 90 88    152 139* 136* 166   NA  --  139  --  140 129*   POTASSIUM  --  4.0  --  3.9 4.2   CHLORIDE  --  106  --  109* 93*   CO2  --  21*  --  21* 23   BUN  --  8.0  --  11.5 14.5   CR 0.77 0.72  --  0.83 0.99*   ANIONGAP  --  12  --  10 13   ANTHONY  --  8.5*  --  8.2* 9.3   GLC  --  93  --  100* 163*   ALBUMIN  --   --   --   --  3.8   PROTTOTAL  --   --   --   --  7.1 "   BILITOTAL  --   --   --   --  1.0   ALKPHOS  --   --   --   --  63   ALT  --   --   --   --  35   AST  --   --   --   --  40       Imaging results reviewed over the past 24 hrs:   No results found for this or any previous visit (from the past 24 hour(s)).  I personally reviewed: no images or EKG's today.

## 2024-07-19 VITALS
TEMPERATURE: 98.7 F | OXYGEN SATURATION: 94 % | HEART RATE: 75 BPM | DIASTOLIC BLOOD PRESSURE: 54 MMHG | BODY MASS INDEX: 32.82 KG/M2 | WEIGHT: 179.45 LBS | SYSTOLIC BLOOD PRESSURE: 135 MMHG | RESPIRATION RATE: 18 BRPM

## 2024-07-19 LAB
BACTERIA BLD CULT: NO GROWTH
BACTERIA BLD CULT: NO GROWTH

## 2024-07-19 PROCEDURE — 99232 SBSQ HOSP IP/OBS MODERATE 35: CPT | Performed by: INTERNAL MEDICINE

## 2024-07-19 PROCEDURE — 250N000013 HC RX MED GY IP 250 OP 250 PS 637: Performed by: STUDENT IN AN ORGANIZED HEALTH CARE EDUCATION/TRAINING PROGRAM

## 2024-07-19 PROCEDURE — 250N000013 HC RX MED GY IP 250 OP 250 PS 637: Performed by: INTERNAL MEDICINE

## 2024-07-19 PROCEDURE — 250N000011 HC RX IP 250 OP 636: Performed by: INTERNAL MEDICINE

## 2024-07-19 RX ADMIN — PANTOPRAZOLE SODIUM 40 MG: 40 TABLET, DELAYED RELEASE ORAL at 06:38

## 2024-07-19 RX ADMIN — ENOXAPARIN SODIUM 40 MG: 40 INJECTION SUBCUTANEOUS at 10:29

## 2024-07-19 RX ADMIN — CARVEDILOL 3.12 MG: 3.12 TABLET, FILM COATED ORAL at 10:29

## 2024-07-19 RX ADMIN — PIPERACILLIN AND TAZOBACTAM 3.38 G: 3; .375 INJECTION, POWDER, FOR SOLUTION INTRAVENOUS at 01:37

## 2024-07-19 RX ADMIN — Medication 400 MG: at 10:29

## 2024-07-19 RX ADMIN — PIPERACILLIN AND TAZOBACTAM 3.38 G: 3; .375 INJECTION, POWDER, FOR SOLUTION INTRAVENOUS at 06:36

## 2024-07-19 RX ADMIN — ASPIRIN 81 MG: 81 TABLET, COATED ORAL at 10:29

## 2024-07-19 RX ADMIN — LOSARTAN POTASSIUM 25 MG: 25 TABLET, FILM COATED ORAL at 10:29

## 2024-07-19 RX ADMIN — POLYETHYLENE GLYCOL 3350 17 G: 17 POWDER, FOR SOLUTION ORAL at 10:30

## 2024-07-19 RX ADMIN — AMOXICILLIN AND CLAVULANATE POTASSIUM 1 TABLET: 875; 125 TABLET, FILM COATED ORAL at 10:29

## 2024-07-19 ASSESSMENT — ACTIVITIES OF DAILY LIVING (ADL)
ADLS_ACUITY_SCORE: 20

## 2024-07-19 NOTE — PLAN OF CARE
VSS. Pt remains afebrile. Pt independent in room, no issues over night. Neuro: A&O x's 4, ANGEL equally, appropriate behaviors. Cardiac: RRR, trace edema in bilateral LE. LS: faint crackles heard in RLL. Skin intact. Plan for discharge today per chart review.     Goal Outcome Evaluation:      Plan of Care Reviewed With: patient    Overall Patient Progress: no change    Outcome Evaluation: Afebrile this shift. Denies pain      Problem: Adult Inpatient Plan of Care  Goal: Plan of Care Review  Outcome: Progressing  Flowsheets (Taken 7/19/2024 0538)  Outcome Evaluation: Afebrile this shift. Denies pain  Plan of Care Reviewed With: patient  Overall Patient Progress: no change  Goal: Patient-Specific Goal (Individualized)  Outcome: Progressing  Goal: Absence of Hospital-Acquired Illness or Injury  Outcome: Progressing  Intervention: Identify and Manage Fall Risk  Recent Flowsheet Documentation  Taken 7/19/2024 0500 by Leilani Elliott RN  Safety Promotion/Fall Prevention: safety round/check completed  Taken 7/19/2024 0400 by Leilani Elliott RN  Safety Promotion/Fall Prevention: safety round/check completed  Taken 7/19/2024 0130 by Leilani Elliott RN  Safety Promotion/Fall Prevention: safety round/check completed  Taken 7/19/2024 0000 by Leilani Elliott RN  Safety Promotion/Fall Prevention: safety round/check completed  Taken 7/18/2024 2200 by Leilani Elliott RN  Safety Promotion/Fall Prevention: safety round/check completed  Taken 7/18/2024 2030 by Leilani Elliott RN  Safety Promotion/Fall Prevention:   safety round/check completed   room near nurse's station   room door open   room organization consistent   lighting adjusted   clutter free environment maintained   nonskid shoes/slippers when out of bed  Taken 7/18/2024 1915 by Leilani Elliott RN  Safety Promotion/Fall Prevention: safety round/check completed  Intervention: Prevent Skin Injury  Recent Flowsheet Documentation  Taken 7/19/2024 0130 by Lexi  Leilani NOVAK RN  Body Position: position changed independently  Taken 7/18/2024 2030 by Leilani Elliott RN  Body Position: position changed independently  Intervention: Prevent and Manage VTE (Venous Thromboembolism) Risk  Recent Flowsheet Documentation  Taken 7/18/2024 2030 by Leilani Elliott RN  VTE Prevention/Management: (Lovenox) other (see comments)  Intervention: Prevent Infection  Recent Flowsheet Documentation  Taken 7/19/2024 0130 by Leilani Elliott RN  Infection Prevention:   single patient room provided   rest/sleep promoted   hand hygiene promoted  Taken 7/18/2024 2030 by Leilani Elliott RN  Infection Prevention:   single patient room provided   rest/sleep promoted   hand hygiene promoted  Goal: Optimal Comfort and Wellbeing  Outcome: Progressing  Intervention: Monitor Pain and Promote Comfort  Recent Flowsheet Documentation  Taken 7/19/2024 0130 by Leilani Elliott RN  Pain Management Interventions:   rest   repositioned  Taken 7/18/2024 2030 by Leilani Elliott RN  Pain Management Interventions:   rest   repositioned  Goal: Readiness for Transition of Care  Outcome: Progressing     Problem: Infection  Goal: Absence of Infection Signs and Symptoms  Outcome: Progressing

## 2024-07-19 NOTE — PLAN OF CARE
Pt A/O x4. AVS and education given. Questions answered. Pt verbalized understanding and had no other questions. Unused medications discarded. PIV removed. All belongings returned to pt. Pt discharged @ 1550 via wheelchair,  provided transportation.

## 2024-07-19 NOTE — PLAN OF CARE
"VSS afebrile. On PO ABX. Pt c/o LLE tenderness, +1LLE edema, Pt stated does have hardware LLE, will discuss with MD. Up Ind. ID and md aware, no further interventions. Pulmonary following see note. Plan for discharge today.     Goal Outcome Evaluation:      Plan of Care Reviewed With: patient    Overall Patient Progress: improvingOverall Patient Progress: improving    Outcome Evaluation: On PO ABX c/o LLE tenderness    Problem: Adult Inpatient Plan of Care  Goal: Plan of Care Review  Description: The Plan of Care Review/Shift note should be completed every shift.  The Outcome Evaluation is a brief statement about your assessment that the patient is improving, declining, or no change.  This information will be displayed automatically on your shift  note.  Outcome: Progressing  Flowsheets (Taken 7/19/2024 0815)  Outcome Evaluation: On PO ABX c/o LLE tenderness  Plan of Care Reviewed With: patient  Overall Patient Progress: improving  Goal: Patient-Specific Goal (Individualized)  Description: You can add care plan individualizations to a care plan. Examples of Individualization might be:  \"Parent requests to be called daily at 9am for status\", \"I have a hard time hearing out of my right ear\", or \"Do not touch me to wake me up as it startles  me\".  Outcome: Progressing  Goal: Absence of Hospital-Acquired Illness or Injury  Outcome: Progressing  Intervention: Identify and Manage Fall Risk  Recent Flowsheet Documentation  Taken 7/19/2024 0740 by Rosalina Sawyer, FRANSISCA  Safety Promotion/Fall Prevention:   activity supervised   clutter free environment maintained  Intervention: Prevent Skin Injury  Recent Flowsheet Documentation  Taken 7/19/2024 0740 by Rosalina Sawyer RN  Body Position: position changed independently  Goal: Optimal Comfort and Wellbeing  Outcome: Progressing  Goal: Readiness for Transition of Care  Outcome: Progressing     Problem: Infection  Goal: Absence of Infection Signs and Symptoms  Outcome: Progressing       "

## 2024-07-19 NOTE — PROGRESS NOTES
[facility]  Infectious Disease Progress Note          Assessment and Plan:   Date of Admission:  7/14/2024  Date of Consult (When I saw the patient): 07/15/24        Assessment & Plan  Bernard Dodge is a 68 year old female who was admitted on 7/14/2024.      Impression: 1 68-year-old female, acute febrile illness, no particular localizing symptoms including no major respiratory symptoms, CT chest unremarkable except for her known interstitial lung disease which is actually improved, labs unremarkable, cultures pending, very large differential diagnosis and at least modestly immunosuppressed  2 chronic interstitial lung disease, currently on azathioprine and tapering prednisone dose, significant prednisone dosing over the last few months so at risk for infections including pneumocystis although imaging and clinical symptoms not suggestive of that diagnosis  3 listed Levaquin allergy     REC 1 Still large differential diagnosis, was at a cabin as this occurred but  not long enough before sx onset, exposure for tickborne disease (neg tickborne panel,) pending  fungal studies, Fungitell is negative making pneumocystis very unlikely and fungal infection also unlikely,   2 procalcitonin surprisingly elevated, no localized bacterial infection unless pneumonia, overall somewhat improved still has some minor fever symptoms but temp has been below 100.  No positive microbiology, I think probably okay here to send her home give 5 days oral Augmentin as though covering possible pneumonia based mainly on the procalcitonin elevation  3 All fungal studies neg  4 her pulmonologist and primary are in the [employer] system, would probably have follow-up with both of them, need to decide what to do with the immunosuppressant drugs for the lung disease, and inferiorly with this new set of symptoms including fever consideration whether the lung disease is actually some other diagnosis besides Boop.  OK  disposition              Interval History:     no new complaints feels very fatigued, slight chills and definite sweats , temp has been [id number] range still feels feverish slightly, , cultures so far negative, CRP elevated, tickborne studies negative and did not really fit.  Fungitell negative, still not have any localized symptoms.  Procalcitonin significantly elevated but no obvious localized bacterial infection site fungal Ab and Ag neg              Medications:     Current Facility-Administered Medications   Medication Dose Route Frequency Provider Last Rate Last Admin    amoxicillin-clavulanate (AUGMENTIN) 875-125 MG per tablet 1 tablet  1 tablet Oral Q12H [facility] (08/20) Kevin Elam MD        aspirin EC tablet 81 mg  81 mg Oral Daily Zachary Kendall MD   81 mg at 07/18/24 0849    atorvastatin (LIPITOR) tablet 80 mg  80 mg Oral QPM Zachary Kendall MD   80 mg at 07/18/24 2031    [Held by provider] azaTHIOprine (IMURAN) tablet 100 mg  100 mg Oral Daily Zachary Kendall MD        carvedilol (COREG) tablet 3.125 mg  3.125 mg Oral BID Zachary Kendall MD   3.125 mg at 07/18/24 2031    enoxaparin ANTICOAGULANT (LOVENOX) injection 40 mg  40 mg Subcutaneous Q24H Zachary Kendall MD   40 mg at 07/18/24 0842    losartan (COZAAR) tablet 25 mg  25 mg Oral Daily Zachary Kendall MD   25 mg at 07/18/24 0849    magnesium oxide (MAG-OX) tablet 400 mg  400 mg Oral Daily Zachary Kendall MD   400 mg at 07/18/24 0849    pantoprazole (PROTONIX) EC tablet 40 mg  40 mg Oral QAM AC Zachary Kendall MD   40 mg at 07/19/24 0638    polyethylene glycol (MIRALAX) Packet 17 g  17 g Oral Daily Kevin Elam MD   17 g at 07/18/24 0842    [Held by provider] predniSONE (DELTASONE) tablet 5 mg  5 mg Oral Daily Zachary Kendall MD   5 mg at 07/15/24 1400    senna-docusate (SENOKOT-S/PERICOLACE) 8.6-50 MG per tablet 1 tablet  1 tablet Oral BID Zachary Kendall MD   1 tablet at 07/18/24 0849  "   Or    senna-docusate (SENOKOT-S/PERICOLACE) 8.6-50 MG per tablet 2 tablet  2 tablet Oral BID Zachary Kendall MD   2 tablet at 07/15/24 0941    sodium chloride (PF) 0.9% PF flush 3 mL  3 mL Intracatheter Q8H Zachary Kendall MD   3 mL at 07/19/24 0217                  Physical Exam:   Blood pressure 135/54, pulse 75, temperature 98.7  F (37.1  C), temperature source Oral, resp. rate 18, weight 81.4 kg (179 lb 7.3 oz), SpO2 94%, not currently breastfeeding.  Wt Readings from Last 2 Encounters:   07/14/24 81.4 kg (179 lb 7.3 oz)   12/07/23 78.9 kg (174 lb)     Vital Signs with Ranges  Temp:  [97.9  F (36.6  C)-98.9  F (37.2  C)] 98.7  F (37.1  C)  Pulse:  [65-75] 75  Resp:  [16-18] 18  BP: (133-148)/(54-69) 135/54  SpO2:  [92 %-96 %] 94 %    Constitutional: Awake, alert, cooperative, no apparent distress not hypoxic respiratory rate 16, not particularly ill looking     Lungs: Clear to auscultation bilaterally, no crackles or wheezing   Cardiovascular: Regular rate and rhythm, normal S1 and S2, and no murmur noted   Abdomen: Normal bowel sounds, soft, non-distended, non-tender   Skin: No rashes, no cyanosis, no edema   Other:           Data:   All microbiology laboratory data reviewed.  Recent Labs   Lab Test 07/18/24  0623 07/17/24  0829 07/16/24  1501 07/15/24  0722   WBC  --  6.6 4.6 6.7   HGB  --  11.7 10.8* 11.4*   HCT  --  36.9 34.1* 36.7   MCV  --  88 88 90    152 139* 136*     Recent Labs   Lab Test 07/18/24  0623 07/17/24  0829 07/15/24  0722   CR 0.77 0.72 0.83     Recent Labs   Lab Test 07/14/24  2236   SED 26     No lab results found.    Invalid input(s): \"UC\"     "

## 2024-07-19 NOTE — PROGRESS NOTES
Brief Pulmonary Note    Chart reviewed. Non-invasive testing negative. On RA. Has been afebrile since 7/17. ABx per primary/ID. No additional recommendations.     Enrique Ponce MD  Pulmonary Disease and Critical Care Medicine  [facility]

## 2024-07-22 LAB — SCANNED LAB RESULT: NORMAL

## 2024-07-26 ENCOUNTER — HOSPITAL ENCOUNTER (INPATIENT)
Facility: CLINIC | Age: 69
LOS: 3 days | Discharge: HOME | DRG: 872 | End: 2024-07-29
Attending: STUDENT IN AN ORGANIZED HEALTH CARE EDUCATION/TRAINING PROGRAM | Admitting: STUDENT IN AN ORGANIZED HEALTH CARE EDUCATION/TRAINING PROGRAM
Payer: MEDICARE

## 2024-07-26 ENCOUNTER — APPOINTMENT (OUTPATIENT)
Dept: ULTRASOUND IMAGING | Facility: CLINIC | Age: 69
DRG: 872 | End: 2024-07-26
Attending: STUDENT IN AN ORGANIZED HEALTH CARE EDUCATION/TRAINING PROGRAM
Payer: MEDICARE

## 2024-07-26 ENCOUNTER — APPOINTMENT (OUTPATIENT)
Dept: CT IMAGING | Facility: CLINIC | Age: 69
DRG: 872 | End: 2024-07-26
Attending: STUDENT IN AN ORGANIZED HEALTH CARE EDUCATION/TRAINING PROGRAM
Payer: MEDICARE

## 2024-07-26 DIAGNOSIS — A41.9 SEPSIS WITHOUT ACUTE ORGAN DYSFUNCTION, DUE TO UNSPECIFIED ORGANISM (H): ICD-10-CM

## 2024-07-26 DIAGNOSIS — J84.89 BOOP (BRONCHIOLITIS OBLITERANS WITH ORGANIZING PNEUMONIA) (H): Primary | ICD-10-CM

## 2024-07-26 LAB
ALBUMIN SERPL BCG-MCNC: 3.9 G/DL (ref 3.5–5.2)
ALBUMIN UR-MCNC: 10 MG/DL
ALP SERPL-CCNC: 107 U/L (ref 40–150)
ALT SERPL W P-5'-P-CCNC: 51 U/L (ref 0–50)
ANION GAP SERPL CALCULATED.3IONS-SCNC: 17 MMOL/L (ref 7–15)
APPEARANCE UR: CLEAR
AST SERPL W P-5'-P-CCNC: 30 U/L (ref 0–45)
ATRIAL RATE - MUSE: 120 BPM
BASE EXCESS BLDV CALC-SCNC: 0.6 MMOL/L (ref -3–3)
BASOPHILS # BLD AUTO: 0 10E3/UL (ref 0–0.2)
BASOPHILS NFR BLD AUTO: 0 %
BILIRUB SERPL-MCNC: 0.6 MG/DL
BILIRUB UR QL STRIP: NEGATIVE
BUN SERPL-MCNC: 20.1 MG/DL (ref 8–23)
C PNEUM DNA SPEC QL NAA+PROBE: NOT DETECTED
CALCIUM SERPL-MCNC: 9.3 MG/DL (ref 8.8–10.4)
CHLORIDE SERPL-SCNC: 100 MMOL/L (ref 98–107)
COLOR UR AUTO: YELLOW
CREAT SERPL-MCNC: 0.76 MG/DL (ref 0.51–0.95)
CREAT SERPL-MCNC: 0.94 MG/DL (ref 0.51–0.95)
DIASTOLIC BLOOD PRESSURE - MUSE: NORMAL MMHG
EGFRCR SERPLBLD CKD-EPI 2021: 66 ML/MIN/1.73M2
EGFRCR SERPLBLD CKD-EPI 2021: 85 ML/MIN/1.73M2
EOSINOPHIL # BLD AUTO: 0 10E3/UL (ref 0–0.7)
EOSINOPHIL NFR BLD AUTO: 0 %
ERYTHROCYTE [DISTWIDTH] IN BLOOD BY AUTOMATED COUNT: 13.8 % (ref 10–15)
FLUAV H1 2009 PAND RNA SPEC QL NAA+PROBE: NOT DETECTED
FLUAV H1 RNA SPEC QL NAA+PROBE: NOT DETECTED
FLUAV H3 RNA SPEC QL NAA+PROBE: NOT DETECTED
FLUAV RNA SPEC QL NAA+PROBE: NEGATIVE
FLUAV RNA SPEC QL NAA+PROBE: NOT DETECTED
FLUBV RNA RESP QL NAA+PROBE: NEGATIVE
FLUBV RNA SPEC QL NAA+PROBE: NOT DETECTED
GLUCOSE SERPL-MCNC: 130 MG/DL (ref 70–99)
GLUCOSE UR STRIP-MCNC: NEGATIVE MG/DL
HADV DNA SPEC QL NAA+PROBE: NOT DETECTED
HCO3 BLDV-SCNC: 25 MMOL/L (ref 21–28)
HCO3 SERPL-SCNC: 19 MMOL/L (ref 22–29)
HCOV PNL SPEC NAA+PROBE: NOT DETECTED
HCT VFR BLD AUTO: 42 % (ref 35–47)
HGB BLD-MCNC: 13.3 G/DL (ref 11.7–15.7)
HGB UR QL STRIP: NEGATIVE
HMPV RNA SPEC QL NAA+PROBE: NOT DETECTED
HPIV1 RNA SPEC QL NAA+PROBE: NOT DETECTED
HPIV2 RNA SPEC QL NAA+PROBE: NOT DETECTED
HPIV3 RNA SPEC QL NAA+PROBE: NOT DETECTED
HPIV4 RNA SPEC QL NAA+PROBE: NOT DETECTED
IMM GRANULOCYTES # BLD: 0.1 10E3/UL
IMM GRANULOCYTES NFR BLD: 1 %
INTERPRETATION ECG - MUSE: NORMAL
KETONES UR STRIP-MCNC: NEGATIVE MG/DL
LACTATE SERPL-SCNC: 2.5 MMOL/L (ref 0.7–2)
LACTATE SERPL-SCNC: 3.3 MMOL/L (ref 0.7–2)
LEUKOCYTE ESTERASE UR QL STRIP: NEGATIVE
LIPASE SERPL-CCNC: 76 U/L (ref 13–60)
LYMPHOCYTES # BLD AUTO: 0.2 10E3/UL (ref 0.8–5.3)
LYMPHOCYTES NFR BLD AUTO: 2 %
M PNEUMO DNA SPEC QL NAA+PROBE: NOT DETECTED
MAGNESIUM SERPL-MCNC: 1.4 MG/DL (ref 1.7–2.3)
MCH RBC QN AUTO: 27.9 PG (ref 26.5–33)
MCHC RBC AUTO-ENTMCNC: 31.7 G/DL (ref 31.5–36.5)
MCV RBC AUTO: 88 FL (ref 78–100)
MONOCYTES # BLD AUTO: 0.1 10E3/UL (ref 0–1.3)
MONOCYTES NFR BLD AUTO: 1 %
NEUTROPHILS # BLD AUTO: 7.7 10E3/UL (ref 1.6–8.3)
NEUTROPHILS NFR BLD AUTO: 96 %
NITRATE UR QL: NEGATIVE
NRBC # BLD AUTO: 0 10E3/UL
NRBC BLD AUTO-RTO: 0 /100
O2/TOTAL GAS SETTING VFR VENT: 94 %
OXYHGB MFR BLDV: 24 % (ref 70–75)
P AXIS - MUSE: 51 DEGREES
PCO2 BLDV: 39 MM HG (ref 40–50)
PH BLDV: 7.41 [PH] (ref 7.32–7.43)
PH UR STRIP: 5.5 [PH] (ref 5–7)
PLATELET # BLD AUTO: 379 10E3/UL (ref 150–450)
PO2 BLDV: 16 MM HG (ref 25–47)
POTASSIUM SERPL-SCNC: 4.6 MMOL/L (ref 3.4–5.3)
PR INTERVAL - MUSE: 130 MS
PROCALCITONIN SERPL IA-MCNC: 0.65 NG/ML
PROT SERPL-MCNC: 7.4 G/DL (ref 6.4–8.3)
QRS DURATION - MUSE: 66 MS
QT - MUSE: 304 MS
QTC - MUSE: 429 MS
R AXIS - MUSE: 23 DEGREES
RBC # BLD AUTO: 4.77 10E6/UL (ref 3.8–5.2)
RBC URINE: 1 /HPF
RSV RNA SPEC NAA+PROBE: NEGATIVE
RSV RNA SPEC QL NAA+PROBE: NOT DETECTED
RSV RNA SPEC QL NAA+PROBE: NOT DETECTED
RV+EV RNA SPEC QL NAA+PROBE: NOT DETECTED
SAO2 % BLDV: 23.8 % (ref 70–75)
SARS-COV-2 RNA RESP QL NAA+PROBE: NEGATIVE
SODIUM SERPL-SCNC: 136 MMOL/L (ref 135–145)
SP GR UR STRIP: 1.01 (ref 1–1.03)
SQUAMOUS EPITHELIAL: 1 /HPF
SYSTOLIC BLOOD PRESSURE - MUSE: NORMAL MMHG
T AXIS - MUSE: 70 DEGREES
TROPONIN T SERPL HS-MCNC: 9 NG/L
UROBILINOGEN UR STRIP-MCNC: NORMAL MG/DL
VENTRICULAR RATE- MUSE: 120 BPM
WBC # BLD AUTO: 8.1 10E3/UL (ref 4–11)
WBC URINE: 2 /HPF

## 2024-07-26 PROCEDURE — 120N000001 HC R&B MED SURG/OB

## 2024-07-26 PROCEDURE — 83605 ASSAY OF LACTIC ACID: CPT | Performed by: STUDENT IN AN ORGANIZED HEALTH CARE EDUCATION/TRAINING PROGRAM

## 2024-07-26 PROCEDURE — 96374 THER/PROPH/DIAG INJ IV PUSH: CPT | Mod: 59

## 2024-07-26 PROCEDURE — 85041 AUTOMATED RBC COUNT: CPT | Performed by: STUDENT IN AN ORGANIZED HEALTH CARE EDUCATION/TRAINING PROGRAM

## 2024-07-26 PROCEDURE — 36415 COLL VENOUS BLD VENIPUNCTURE: CPT | Performed by: STUDENT IN AN ORGANIZED HEALTH CARE EDUCATION/TRAINING PROGRAM

## 2024-07-26 PROCEDURE — 87637 SARSCOV2&INF A&B&RSV AMP PRB: CPT | Performed by: STUDENT IN AN ORGANIZED HEALTH CARE EDUCATION/TRAINING PROGRAM

## 2024-07-26 PROCEDURE — 250N000013 HC RX MED GY IP 250 OP 250 PS 637: Performed by: STUDENT IN AN ORGANIZED HEALTH CARE EDUCATION/TRAINING PROGRAM

## 2024-07-26 PROCEDURE — 84145 PROCALCITONIN (PCT): CPT | Performed by: STUDENT IN AN ORGANIZED HEALTH CARE EDUCATION/TRAINING PROGRAM

## 2024-07-26 PROCEDURE — 71275 CT ANGIOGRAPHY CHEST: CPT | Mod: MA

## 2024-07-26 PROCEDURE — 82805 BLOOD GASES W/O2 SATURATION: CPT | Performed by: STUDENT IN AN ORGANIZED HEALTH CARE EDUCATION/TRAINING PROGRAM

## 2024-07-26 PROCEDURE — 81001 URINALYSIS AUTO W/SCOPE: CPT | Performed by: STUDENT IN AN ORGANIZED HEALTH CARE EDUCATION/TRAINING PROGRAM

## 2024-07-26 PROCEDURE — 87633 RESP VIRUS 12-25 TARGETS: CPT | Performed by: STUDENT IN AN ORGANIZED HEALTH CARE EDUCATION/TRAINING PROGRAM

## 2024-07-26 PROCEDURE — 87581 M.PNEUMON DNA AMP PROBE: CPT | Performed by: STUDENT IN AN ORGANIZED HEALTH CARE EDUCATION/TRAINING PROGRAM

## 2024-07-26 PROCEDURE — 250N000011 HC RX IP 250 OP 636: Performed by: STUDENT IN AN ORGANIZED HEALTH CARE EDUCATION/TRAINING PROGRAM

## 2024-07-26 PROCEDURE — 96361 HYDRATE IV INFUSION ADD-ON: CPT

## 2024-07-26 PROCEDURE — 258N000003 HC RX IP 258 OP 636: Performed by: STUDENT IN AN ORGANIZED HEALTH CARE EDUCATION/TRAINING PROGRAM

## 2024-07-26 PROCEDURE — 93970 EXTREMITY STUDY: CPT

## 2024-07-26 PROCEDURE — 83735 ASSAY OF MAGNESIUM: CPT | Performed by: STUDENT IN AN ORGANIZED HEALTH CARE EDUCATION/TRAINING PROGRAM

## 2024-07-26 PROCEDURE — 99223 1ST HOSP IP/OBS HIGH 75: CPT | Mod: AI | Performed by: STUDENT IN AN ORGANIZED HEALTH CARE EDUCATION/TRAINING PROGRAM

## 2024-07-26 PROCEDURE — 84075 ASSAY ALKALINE PHOSPHATASE: CPT | Performed by: STUDENT IN AN ORGANIZED HEALTH CARE EDUCATION/TRAINING PROGRAM

## 2024-07-26 PROCEDURE — 87899 AGENT NOS ASSAY W/OPTIC: CPT | Performed by: STUDENT IN AN ORGANIZED HEALTH CARE EDUCATION/TRAINING PROGRAM

## 2024-07-26 PROCEDURE — 93005 ELECTROCARDIOGRAM TRACING: CPT

## 2024-07-26 PROCEDURE — 83690 ASSAY OF LIPASE: CPT | Performed by: STUDENT IN AN ORGANIZED HEALTH CARE EDUCATION/TRAINING PROGRAM

## 2024-07-26 PROCEDURE — 99285 EMERGENCY DEPT VISIT HI MDM: CPT | Mod: 25

## 2024-07-26 PROCEDURE — 82374 ASSAY BLOOD CARBON DIOXIDE: CPT | Performed by: STUDENT IN AN ORGANIZED HEALTH CARE EDUCATION/TRAINING PROGRAM

## 2024-07-26 PROCEDURE — 84484 ASSAY OF TROPONIN QUANT: CPT | Performed by: STUDENT IN AN ORGANIZED HEALTH CARE EDUCATION/TRAINING PROGRAM

## 2024-07-26 PROCEDURE — 82565 ASSAY OF CREATININE: CPT | Performed by: STUDENT IN AN ORGANIZED HEALTH CARE EDUCATION/TRAINING PROGRAM

## 2024-07-26 PROCEDURE — 96375 TX/PRO/DX INJ NEW DRUG ADDON: CPT

## 2024-07-26 PROCEDURE — 87040 BLOOD CULTURE FOR BACTERIA: CPT | Performed by: STUDENT IN AN ORGANIZED HEALTH CARE EDUCATION/TRAINING PROGRAM

## 2024-07-26 RX ORDER — AMOXICILLIN 250 MG
1 CAPSULE ORAL 2 TIMES DAILY PRN
Status: DISCONTINUED | OUTPATIENT
Start: 2024-07-26 | End: 2024-07-29 | Stop reason: HOSPADM

## 2024-07-26 RX ORDER — PROCHLORPERAZINE MALEATE 5 MG
5 TABLET ORAL EVERY 6 HOURS PRN
Status: DISCONTINUED | OUTPATIENT
Start: 2024-07-26 | End: 2024-07-29 | Stop reason: HOSPADM

## 2024-07-26 RX ORDER — ACETAMINOPHEN 325 MG/1
650 TABLET ORAL EVERY 4 HOURS PRN
Status: DISCONTINUED | OUTPATIENT
Start: 2024-07-26 | End: 2024-07-29 | Stop reason: HOSPADM

## 2024-07-26 RX ORDER — ACETAMINOPHEN 500 MG
1000 TABLET ORAL ONCE
Status: COMPLETED | OUTPATIENT
Start: 2024-07-26 | End: 2024-07-26

## 2024-07-26 RX ORDER — AZATHIOPRINE 50 MG/1
50 TABLET ORAL DAILY
Status: ON HOLD | COMMUNITY
Start: 2024-07-26 | End: 2024-07-29

## 2024-07-26 RX ORDER — SODIUM CHLORIDE 9 MG/ML
INJECTION, SOLUTION INTRAVENOUS CONTINUOUS
Status: DISCONTINUED | OUTPATIENT
Start: 2024-07-26 | End: 2024-07-27

## 2024-07-26 RX ORDER — ONDANSETRON 4 MG/1
4 TABLET, ORALLY DISINTEGRATING ORAL EVERY 6 HOURS PRN
Status: DISCONTINUED | OUTPATIENT
Start: 2024-07-26 | End: 2024-07-29 | Stop reason: HOSPADM

## 2024-07-26 RX ORDER — ATORVASTATIN CALCIUM 80 MG/1
80 TABLET, FILM COATED ORAL AT BEDTIME
COMMUNITY
Start: 2023-08-22

## 2024-07-26 RX ORDER — MAGNESIUM SULFATE HEPTAHYDRATE 40 MG/ML
4 INJECTION, SOLUTION INTRAVENOUS ONCE
Status: COMPLETED | OUTPATIENT
Start: 2024-07-27 | End: 2024-07-27

## 2024-07-26 RX ORDER — ENOXAPARIN SODIUM 100 MG/ML
40 INJECTION SUBCUTANEOUS EVERY 24 HOURS
Status: DISCONTINUED | OUTPATIENT
Start: 2024-07-26 | End: 2024-07-29 | Stop reason: HOSPADM

## 2024-07-26 RX ORDER — CALCIUM CARBONATE 500 MG/1
1000 TABLET, CHEWABLE ORAL 4 TIMES DAILY PRN
Status: DISCONTINUED | OUTPATIENT
Start: 2024-07-26 | End: 2024-07-29 | Stop reason: HOSPADM

## 2024-07-26 RX ORDER — ASPIRIN 81 MG/1
81 TABLET ORAL DAILY
Status: DISCONTINUED | OUTPATIENT
Start: 2024-07-27 | End: 2024-07-27

## 2024-07-26 RX ORDER — LIDOCAINE 40 MG/G
CREAM TOPICAL
Status: DISCONTINUED | OUTPATIENT
Start: 2024-07-26 | End: 2024-07-29 | Stop reason: HOSPADM

## 2024-07-26 RX ORDER — ONDANSETRON 2 MG/ML
4 INJECTION INTRAMUSCULAR; INTRAVENOUS EVERY 6 HOURS PRN
Status: DISCONTINUED | OUTPATIENT
Start: 2024-07-26 | End: 2024-07-29 | Stop reason: HOSPADM

## 2024-07-26 RX ORDER — ACETAMINOPHEN 650 MG/1
650 SUPPOSITORY RECTAL EVERY 4 HOURS PRN
Status: DISCONTINUED | OUTPATIENT
Start: 2024-07-26 | End: 2024-07-29 | Stop reason: HOSPADM

## 2024-07-26 RX ORDER — PIPERACILLIN SODIUM, TAZOBACTAM SODIUM 4; .5 G/20ML; G/20ML
4.5 INJECTION, POWDER, LYOPHILIZED, FOR SOLUTION INTRAVENOUS EVERY 6 HOURS
Status: DISCONTINUED | OUTPATIENT
Start: 2024-07-26 | End: 2024-07-27

## 2024-07-26 RX ORDER — AZITHROMYCIN 500 MG/5ML
500 INJECTION, POWDER, LYOPHILIZED, FOR SOLUTION INTRAVENOUS EVERY 24 HOURS
Status: COMPLETED | OUTPATIENT
Start: 2024-07-26 | End: 2024-07-26

## 2024-07-26 RX ORDER — PROCHLORPERAZINE 25 MG
12.5 SUPPOSITORY, RECTAL RECTAL EVERY 12 HOURS PRN
Status: DISCONTINUED | OUTPATIENT
Start: 2024-07-26 | End: 2024-07-29 | Stop reason: HOSPADM

## 2024-07-26 RX ORDER — AMOXICILLIN 250 MG
2 CAPSULE ORAL 2 TIMES DAILY PRN
Status: DISCONTINUED | OUTPATIENT
Start: 2024-07-26 | End: 2024-07-29 | Stop reason: HOSPADM

## 2024-07-26 RX ORDER — ONDANSETRON 2 MG/ML
4 INJECTION INTRAMUSCULAR; INTRAVENOUS EVERY 30 MIN PRN
Status: DISCONTINUED | OUTPATIENT
Start: 2024-07-26 | End: 2024-07-26

## 2024-07-26 RX ORDER — IOPAMIDOL 755 MG/ML
500 INJECTION, SOLUTION INTRAVASCULAR ONCE
Status: COMPLETED | OUTPATIENT
Start: 2024-07-26 | End: 2024-07-26

## 2024-07-26 RX ADMIN — ONDANSETRON 4 MG: 2 INJECTION INTRAMUSCULAR; INTRAVENOUS at 15:30

## 2024-07-26 RX ADMIN — SODIUM CHLORIDE 1000 ML: 9 INJECTION, SOLUTION INTRAVENOUS at 15:25

## 2024-07-26 RX ADMIN — IOPAMIDOL 90 ML: 755 INJECTION, SOLUTION INTRAVENOUS at 16:14

## 2024-07-26 RX ADMIN — AZITHROMYCIN MONOHYDRATE 500 MG: 500 INJECTION, POWDER, LYOPHILIZED, FOR SOLUTION INTRAVENOUS at 17:50

## 2024-07-26 RX ADMIN — ACETAMINOPHEN 1000 MG: 500 TABLET, FILM COATED ORAL at 15:30

## 2024-07-26 RX ADMIN — PIPERACILLIN AND TAZOBACTAM 4.5 G: 4; .5 INJECTION, POWDER, FOR SOLUTION INTRAVENOUS at 21:09

## 2024-07-26 RX ADMIN — SODIUM CHLORIDE: 9 INJECTION, SOLUTION INTRAVENOUS at 18:33

## 2024-07-26 RX ADMIN — SODIUM CHLORIDE: 9 INJECTION, SOLUTION INTRAVENOUS at 21:09

## 2024-07-26 RX ADMIN — SODIUM CHLORIDE 500 ML: 9 INJECTION, SOLUTION INTRAVENOUS at 17:54

## 2024-07-26 RX ADMIN — ACETAMINOPHEN 650 MG: 325 TABLET, FILM COATED ORAL at 21:06

## 2024-07-26 RX ADMIN — PIPERACILLIN AND TAZOBACTAM 4.5 G: 4; .5 INJECTION, POWDER, FOR SOLUTION INTRAVENOUS at 16:44

## 2024-07-26 ASSESSMENT — ACTIVITIES OF DAILY LIVING (ADL)
ADLS_ACUITY_SCORE: 35
ADLS_ACUITY_SCORE: 22
ADLS_ACUITY_SCORE: 22
ADLS_ACUITY_SCORE: 33

## 2024-07-26 ASSESSMENT — COLUMBIA-SUICIDE SEVERITY RATING SCALE - C-SSRS
6. HAVE YOU EVER DONE ANYTHING, STARTED TO DO ANYTHING, OR PREPARED TO DO ANYTHING TO END YOUR LIFE?: NO
2. HAVE YOU ACTUALLY HAD ANY THOUGHTS OF KILLING YOURSELF IN THE PAST MONTH?: NO
1. IN THE PAST MONTH, HAVE YOU WISHED YOU WERE DEAD OR WISHED YOU COULD GO TO SLEEP AND NOT WAKE UP?: NO

## 2024-07-26 NOTE — ED PROVIDER NOTES
Emergency Department Note      History of Present Illness     Chief Complaint   Cough      HPI   Bernard Dodge is a 68 year old female with history of bronchiolitis obliterans with organizing pneumonia, breast cancer, NSTEMI, CAD, CHF, hypertension, and type 2 diabetes mellitus who presents to the ED with family members for evaluation of cough, nausea, and vomiting. Patient presents with worsening cough since around 1200 earlier today. Reports associated lower abdominal cramping. Per family member, Bernard has been very nauseous and had 4 episodes of vomiting prior to arrival at the ED. She was also febrile at home and now in the ED. She did not take any medications for the fever. Patient endorses chills, urinary frequency yesterday, low back pain, and ongoing constipation. Denies pharyngitis, new rash, dysuria, chest pain, diarrhea, or known sick contacts. Of note, patient was recently hospitalized for pneumonia (discharged 7/19/24).         Independent Historian   Family member as detailed above.    Review of External Notes   I reviewed Dr. Millan's 7/18/24 Infectious Disease Progress Note. Fungitell is negative. Coccidiomycosis test is processing.      Past Medical History     Medical History and Problem List   Hypertension   Bronchiolitis obliterans with organizing pneumonia  Breast cancer  CAD   Type 2 diabetes mellitus   Hyperlipidemia   Tobacco use  Fatty liver disease, nonalcoholic  Ischemic cardiomyopathy  Obstructive sleep apnea   STEMI  Adenoma of large intestine  Lung nodule  Polyp of colon  Radiation pneumonitis   CHF     Medications   Aspirin 81 mg   Atorvastatin   Carvedilol  Losartan  Nitroglycerin   Ondansetron  Azathioprine     Surgical History   CV coronary angiogram  CV instantaneous wave-free ratio  Heart cath, angioplasty   Coronary stent   Breast lumpectomy   Ankle surgery, right   Breast biopsy     Physical Exam     Patient Vitals for the past 24 hrs:   BP Temp Temp src Pulse Resp SpO2  "Height Weight   07/26/24 2124 -- -- -- -- -- -- 1.575 m (5' 2\") --   07/26/24 1934 -- 98.7  F (37.1  C) Oral 106 20 -- -- 82.4 kg (181 lb 9.6 oz)   07/26/24 1900 103/41 -- -- 110 21 96 % -- --   07/26/24 1845 112/54 -- -- 112 25 96 % -- --   07/26/24 1831 -- -- -- 109 26 95 % -- --   07/26/24 1815 108/52 -- -- 109 20 93 % -- --   07/26/24 1745 113/49 -- -- 116 17 93 % -- --   07/26/24 1730 (!) 103/37 -- -- 116 23 94 % -- --   07/26/24 1645 121/61 -- -- 118 22 95 % -- --   07/26/24 1630 136/55 -- -- 118 20 94 % -- --   07/26/24 1600 134/51 -- -- 113 19 95 % -- --   07/26/24 1545 139/50 -- -- 114 19 95 % -- --   07/26/24 1515 128/77 -- -- -- 20 97 % -- --   07/26/24 1510 (!) 140/84 -- -- (!) 125 -- 95 % -- --   07/26/24 1441 103/55 (!) 102.2  F (39  C) -- 117 24 97 % -- --   07/26/24 1433 -- -- -- -- -- -- 1.575 m (5' 2\") 79.4 kg (175 lb)     Physical Exam    General: Awake, alert, in no acute distress   HEENT: Atraumatic   EOM normal   External ears normal   Trachea midline  Neck: Supple, normal ROM  CV: Regular rate, regular rhythm   No lower extremity edema  2+ radial and DP pulses  PULM: Breath sounds normal bilaterally  No wheezes or rales  ABD: Soft, non-tender, non-distended  Normal bowel sounds   No rebound or guarding   MSK: No gross deformities  NEURO: Alert, no focal deficits  Skin: Warm, dry and intact     Diagnostics     Lab Results   Labs Ordered and Resulted from Time of ED Arrival to Time of ED Departure   PROCALCITONIN - Abnormal       Result Value    Procalcitonin 0.65 (*)    LACTIC ACID WHOLE BLOOD WITH 1X REPEAT IN 2 HR WHEN >2 - Abnormal    Lactic Acid, Initial 3.3 (*)    COMPREHENSIVE METABOLIC PANEL - Abnormal    Sodium 136      Potassium 4.6      Carbon Dioxide (CO2) 19 (*)     Anion Gap 17 (*)     Urea Nitrogen 20.1      Creatinine 0.76      GFR Estimate 85      Calcium 9.3      Chloride 100      Glucose 130 (*)     Alkaline Phosphatase 107      AST 30      ALT 51 (*)     Protein Total 7.4   "    Albumin 3.9      Bilirubin Total 0.6     LIPASE - Abnormal    Lipase 76 (*)    CBC WITH PLATELETS AND DIFFERENTIAL - Abnormal    WBC Count 8.1      RBC Count 4.77      Hemoglobin 13.3      Hematocrit 42.0      MCV 88      MCH 27.9      MCHC 31.7      RDW 13.8      Platelet Count 379      % Neutrophils 96      % Lymphocytes 2      % Monocytes 1      % Eosinophils 0      % Basophils 0      % Immature Granulocytes 1      NRBCs per 100 WBC 0      Absolute Neutrophils 7.7      Absolute Lymphocytes 0.2 (*)     Absolute Monocytes 0.1      Absolute Eosinophils 0.0      Absolute Basophils 0.0      Absolute Immature Granulocytes 0.1      Absolute NRBCs 0.0     BLOOD GAS VENOUS - Abnormal    pH Venous 7.41      pCO2 Venous 39 (*)     pO2 Venous 16 (*)     Bicarbonate Venous 25      Base Excess/Deficit Venous 0.6      FIO2 94      Oxyhemoglobin Venous 24 (*)     O2 Sat, Venous 23.8 (*)    LACTIC ACID WHOLE BLOOD - Abnormal    Lactic Acid 2.5 (*)    INFLUENZA A/B, RSV, & SARS-COV2 PCR - Normal    Influenza A PCR Negative      Influenza B PCR Negative      RSV PCR Negative      SARS CoV2 PCR Negative     TROPONIN T, HIGH SENSITIVITY - Normal    Troponin T, High Sensitivity 9     BLOOD CULTURE   LEGIONELLA URINARY ANTIGEN AND STREPTOCOCCUS PNEUMONIAE ANTIGEN   RESPIRATORY AEROBIC BACTERIAL CULTURE       Imaging   US Lower Extremity Venous Duplex Bilateral   Final Result   IMPRESSION:   1.  No deep venous thrombosis in the bilateral lower extremities.      CT Chest (PE) Abdomen Pelvis w Contrast   Final Result   IMPRESSION:   1.  Stable appearance of reticular changes and groundglass attenuation   throughout both lungs, favor sequela of prior infectious/inflammatory   process. No definite new airspace consolidation.   2.  Interval enlargement of pulmonary nodules since CT on 7/14/2024,   although these are reportedly similar in size to a CT in June 2024   which is not currently available for comparison. Recommend follow-up   CT  in 3 months to document stability.   3.  No pulmonary embolus.   4.  No acute abnormality in the abdomen or pelvis.      AYUSH LAWRENCE MD            SYSTEM ID:  [id number]          EKG 1   ECG taken at 1511, ECG read at 1519  Sinus tachycardia   Otherwise normal ECG    Rate increased as compared to prior, dated 11/20/23.  Rate 120 bpm. OR interval 130 ms. QRS duration 66 ms. QT/QTc 304/429 ms. P-R-T axes 51 23 70.    EKG 2  ECG taken at 1800, ECG read at 1801  Sinus tachycardia  Low voltage QRS  T wave abnormality, consider inferolateral ischemia   Abnormal ECG  Same compared to prior, dated 7/26/24   Rate 112 bpm. OR interval 150 ms. QRS duration 70 ms. QT/QTc 312/424 ms. P-R-T axes 62 10 -39.     Independent Interpretation   None    ED Course      Medications Administered   Medications   lidocaine 1 % 0.1-1 mL (has no administration in time range)   lidocaine (LMX4) cream (has no administration in time range)   sodium chloride (PF) 0.9% PF flush 3 mL (3 mLs Intracatheter Not Given 7/26/24 2250)   sodium chloride (PF) 0.9% PF flush 3 mL (has no administration in time range)   sodium chloride 0.9 % infusion ( Intravenous $New Bag 7/26/24 2109)   piperacillin-tazobactam (ZOSYN) 4.5 g vial to attach to  mL bag (4.5 g Intravenous $New Bag 7/26/24 2109)   azithromycin (ZITHROMAX) 250 mg in  mL intermittent infusion (has no administration in time range)   CT scan flush ( Intravenous Canceled Entry 7/26/24 2055)   lidocaine 1 % 0.1-1 mL (has no administration in time range)   lidocaine (LMX4) cream (has no administration in time range)   sodium chloride (PF) 0.9% PF flush 3 mL (3 mLs Intracatheter $Given 7/26/24 2108)   sodium chloride (PF) 0.9% PF flush 3 mL (has no administration in time range)   senna-docusate (SENOKOT-S/PERICOLACE) 8.6-50 MG per tablet 1 tablet (has no administration in time range)     Or   senna-docusate (SENOKOT-S/PERICOLACE) 8.6-50 MG per tablet 2 tablet (has no administration in time  range)   calcium carbonate (TUMS) chewable tablet 1,000 mg (has no administration in time range)   enoxaparin ANTICOAGULANT (LOVENOX) injection 40 mg (40 mg Subcutaneous Not Given 7/26/24 2108)   acetaminophen (TYLENOL) tablet 650 mg (650 mg Oral $Given 7/26/24 2106)     Or   acetaminophen (TYLENOL) Suppository 650 mg ( Rectal See Alternative 7/26/24 2106)   melatonin tablet 1 mg (has no administration in time range)   ondansetron (ZOFRAN ODT) ODT tab 4 mg (has no administration in time range)     Or   ondansetron (ZOFRAN) injection 4 mg (has no administration in time range)   prochlorperazine (COMPAZINE) injection 5 mg (has no administration in time range)     Or   prochlorperazine (COMPAZINE) tablet 5 mg (has no administration in time range)     Or   prochlorperazine (COMPAZINE) suppository 12.5 mg (has no administration in time range)   aspirin EC tablet 81 mg (has no administration in time range)   magnesium sulfate 4 g in 100 mL sterile water intermittent infusion (has no administration in time range)   sodium chloride 0.9% BOLUS 1,000 mL (0 mLs Intravenous Stopped 7/26/24 1722)   acetaminophen (TYLENOL) tablet 1,000 mg (1,000 mg Oral $Given 7/26/24 1530)   sodium chloride 0.9% BOLUS 1,503 mL (0 mLs Intravenous Stopped 7/26/24 1834)   azithromycin (ZITHROMAX) 500 mg in  mL intermittent infusion (500 mg Intravenous $New Bag 7/26/24 1750)   iopamidol (ISOVUE-370) solution 500 mL (90 mLs Intravenous $Given 7/26/24 1614)       Procedures   Procedures     Discussion of Management   Admitting Hospitalist, Dr. Bailey    ED Course   ED Course as of 07/26/24 4509   Fri Jul 26, 2024   5486 I obtained history and examined the patient as noted above.    6739 Spoke with Clinical Pharmacist.    6803 I spoke with Dr. Bailey, hospitalist, regarding the patient's history and presentation in the emergency department today.         Optional/Additional Documentation  None    Medical Decision Making / Diagnosis     CMS Diagnoses:  "The patient has signs of Severe Sepsis     The patient has signs of Severe Sepsis as evidenced by:    1. 2 SIRS criteria, AND  2. Suspected infection, AND   3. Organ dysfunction: Lactic Acidosis with value >2.0    Time severe sepsis diagnosis confirmed: 1521  07/25/24 as this was the time when Lactate resulted, and the level was > 2.0    3 Hour Severe Sepsis Bundle Completion:    1. Initial Lactic Acid Result:   Recent Labs   Lab Test 07/26/24  1824 07/26/24  1521 07/14/24  2236   LACT 2.5* 3.3* 1.5     2. Blood Cultures before Antibiotics: Yes  Note: Due to a national blood culture bottle shortage, reduced blood cultures may have been drawn on this patient.  3. Broad Spectrum Antibiotics Administered:  yes       Anti-infectives (From admission through now)      Start     Dose/Rate Route Frequency Ordered Stop    07/26/24 1550  piperacillin-tazobactam (ZOSYN) 4.5 g vial to attach to  mL bag        Note to Pharmacy: For [facility], [facility] and [facility]: For Zosyn-naive patients, use the \"Zosyn initial dose + extended infusion\" order panel.    4.5 g  over 30 Minutes Intravenous EVERY 6 HOURS 07/26/24 1546 08/02/24 1549    07/26/24 1550  azithromycin (ZITHROMAX) 500 mg in  mL intermittent infusion         500 mg  over 1 Hours Intravenous EVERY 24 HOURS 07/26/24 1546 07/26/24 1850            4. Is initial hypotension present?     No (IV fluid bolus NOT required). IV Fluid volume administered: 1.53 L    Severe Sepsis reassessment:  1. Repeat Lactic Acid Level within 6 hours of time zero: 2.5  2. MAP>65 after initial IVF bolus, will continue to monitor fluid status and vital signs         MIPS   MIPS: CT for PE was ordered because the patient is high risk for pulmonary embolism.   CT for PE was ordered because the patient is high risk for pulmonary embolism.    OBDULIA Dodge is a 68 year old female who presents with 1 day of fever, slightly worse cough from her baseline, now some crampy abdominal pain with nausea and " vomiting.  Complicated infectious disease history due to her history of BOOP. She is immunocompromised on Imuran.  Meeting severe sepsis criteria due to lactic acidosis, suspected infection, fever on arrival.  Other differential considered included atypical ACS, pericarditis, PE.  Given focal lung infiltrates, potential these could represent lung infarctions.  PE study is fortunately negative for PE.  Pneumonia does not look particularly worse than prior.  CT abdomen pelvis does not show acute pathology to explain her symptoms.  1 set of blood cultures obtained due to national shortage.  Pro-Sami is improving from recent priors.  Added on respiratory virus panel, Legionella.  Treated with Zosyn and azithromycin due to concern for treatment resistant community-acquired pneumonia.  EKG is nonischemic.  Troponin is undetectable.  Admitted to hospital medicine with telemetry for further workup.    Disposition   The patient was admitted to the hospital.     Diagnosis     ICD-10-CM    1. Sepsis without acute organ dysfunction, due to unspecified organism (H)  A41.9                Scribe Disclosure:  I, Ashleigh Malki, am serving as a scribe at 3:16 PM on 7/26/2024 to document services personally performed by Fanny Caldera DO based on my observations and the provider's statements to me.        Fanny Caldera DO  07/26/24 [id number]

## 2024-07-26 NOTE — H&P
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    Unexplained fever and sepsis:  Patient had a recent hospitalization for same just earlier this month.  She has a known medical history of BOOP for which she has been taking Imuran and prednisone.  She had a broad workup during her last hospitalization which showed negative UA, negative blood cultures, negative respiratory viral panel, negative beta D glucan, negative Histoplasma, negative blastomyces, negative coccidioides, negative tickborne disease.  No infection was found.  She was treated for a suspected pneumonia.  She was discharged with 5 day course of augmentin.  She stopped this 2 days prior to arrival.  Of note, she resumed her imuran morning of presentation and had fever and vomiting within 3 or so hours.  I question if this is truly a return of infection or if we should consider her imuran as a cause of fever & vomiting.  There are case reports of imuran causing fever.  Should also consider malignancy and clot.  No PE on CT scan.    -ID and pulmonary consultation  -Continue zosyn & azithro for now  -Hold PTA imuran - should consider this a possible cause of recurrent fevers  -BLE US to rule out DVT  -Follow-up blood culture    Nausea and vomitin-3 hours after restarting imuran.  Associated with fever.  Denies diarrhea or abdominal discomfort.  Possible that she caught some viral infection/gastroenteritis.   -Supportive cares with IVF & meds    AGMA  Lactic acidosis:  Anion gap  "metabolic acidosis likely related to lactic acidosis in the setting of sepsis.  Lactic acid 3.3 on arrival.  Improved to 2.5 after sepsis fluid bolus.  Likely contribution from recurrent fever.  Patient is also mildly tachycardic and with softer blood pressure.  -Received sepsis fluid in the emergency department  -Continue MIVF with normal saline  -Continue antibiotics as above.    CAD  HTN  HLD: History LAD STEMI in 2016 requiring PCI.  Had an ischemic cardiomyopathy then that has resolved.  Not chronically on diuretics.  Most recent EF 50-55% last year.  She is on losartan 25 mg daily, carvedilol 3.125 mg twice daily, atorvastatin 80 mg daily, and aspirin 81 mg daily.  -Hold coreg and losartan given softer blood pressure & sepsis  -Continue ASA & statin     Diet controlled type II DM: A1c has been in the 6 range.  Glucose here 163.  -If pulmonology recommends increase in steroids then she will need to be started on at least a sliding scale     History of breast cancer: S/p lumpectomy and radiation.          Diet:  Regular  DVT Prophylaxis: Enoxaparin (Lovenox) SQ  Renteria Catheter: Not present  Lines: None     Cardiac Monitoring: None  Code Status:  Full    Clinically Significant Risk Factors Present on Admission                # Drug Induced Platelet Defect: home medication list includes an antiplatelet medication   # Hypertension: Noted on problem list         # Obesity: Estimated body mass index is 32.01 kg/m  as calculated from the following:    Height as of this encounter: 1.575 m (5' 2\").    Weight as of this encounter: 79.4 kg (175 lb).              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    Patient was just admitted earlier this month for fever and sepsis.  She had significant workup for infection.  No infection was found.  She was treated with an empiric course of IV antibiotics and was transitioned to Augmentin for 5 more days on discharge.  She finished the Augmentin 2 days ago.  She restarted her Imuran today.  2 to 3 hours after restarting her Imuran she had a sudden onset nausea and vomiting as well as fever.  Fever to 102 Fahrenheit.  Patient is suspicious that symptoms and presentation may be related to her Imuran dose.      Past Medical History    Past Medical History:   Diagnosis Date    Benign essential hypertension     BOOP (bronchiolitis obliterans with organizing pneumonia) (H)     post radiation    Breast CA (H)     left side    CAD (coronary artery disease), native coronary artery     STEMI 8/2016  PCI mid LAD 8/22/16    DM (diabetes mellitus screen)     Dyslipidemia     Ex-smoker     Fatty liver disease, nonalcoholic     Fracture of left ankle     Ischemic cardiomyopathy     Sleep apnea     Cpap       Past Surgical History   Past Surgical History:   Procedure Laterality Date    CV CORONARY ANGIOGRAM N/A 11/20/2023    Procedure: Coronary Angiogram;  Surgeon: Ivan Goldstein MD;  Location: [facility] CARDIAC CATH LAB    CV INSTANTANEOUS WAVE-FREE RATIO N/A 11/20/2023    Procedure: Instantaneous Wave-Free Ratio;  Surgeon: Ivan Goldstein MD;  Location: [facility] CARDIAC CATH LAB    HEART CATH, ANGIOPLASTY      STENT,  CORONARY, S660 15/18  08/22/2016    MABEL=LAD       Prior to Admission Medications   Prior to Admission Medications   Prescriptions Last Dose Informant Patient Reported? Taking?   Biotin 16663 MCG TABS   Yes No   Sig: Take 1 tablet by mouth daily   Calcium Carbonate-Vit D-Min (CALCIUM 1200) 7918-5025 MG-UNIT CHEW   Yes No   Sig: Take 1 tablet by mouth daily   Probiotic Product (PROBIOTIC PO)  Self Yes No   Sig: Take 1 capsule by mouth at bedtime   amoxicillin-clavulanate (AUGMENTIN) 875-125 MG tablet   No No   Sig: Take 1 tablet by mouth every 12 hours for 11 doses   aspirin 81 MG tablet   No No   Sig: Take 1 tablet (81 mg) by mouth daily   atorvastatin (LIPITOR) 80 MG tablet   No No   Sig: Take 1 tablet (80 mg) by mouth daily   carvedilol (COREG) 3.125 MG tablet   No No   Sig: Take 1 tablet (3.125 mg) by mouth 2 times daily   losartan (COZAAR) 25 MG tablet   No No   Sig: Take 1 tablet (25 mg) by mouth daily   magnesium oxide (MAGNESIUM OXIDE) 400 MG tablet   Yes No   Sig: Take 400 mg by mouth at bedtime   nitroGLYcerin (NITROSTAT) 0.4 MG sublingual tablet   No No   Sig: Place 1 tablet (0.4 mg) under the tongue every 5 minutes as needed for chest pain if you are still having symptoms after 3 doses (15 minutes) call 911.   ondansetron (ZOFRAN ODT) 4 MG ODT tab   Yes No   Sig: Take 4 mg by mouth every 6 hours as needed for nausea   vitamin C (ASCORBIC ACID) 1000 MG TABS   Yes No   Sig: Take 1,000 mg by mouth daily      Facility-Administered Medications: None        Review of Systems    The 10 point Review of Systems is negative other than noted in the HPI or here.     Social History   I have reviewed this patient's social history and updated it with pertinent information if needed.  Social History     Tobacco Use    Smoking status: Former     Current packs/day: 0.00     Average packs/day: 1 pack/day for 25.0 years (25.0 ttl pk-yrs)     Types: Cigarettes     Start date: 1976     Quit date: 2001     Years since quitting:  23.5    Smokeless tobacco: Never   Vaping Use    Vaping status: Never Used   Substance Use Topics    Alcohol use: Yes     Comment: 2 glasses of wine before bedtime    Drug use: Not Currently         Family History   I have reviewed this patient's family history and updated it with pertinent information if needed.  Family History   Problem Relation Age of Onset    Hypertension Mother     Myocardial Infarction Mother     Heart Disease Father     Diabetes Brother     Heart Disease Brother          Allergies   Allergies   Allergen Reactions    Cellcept [Mycophenolate]     Levaquin [Levofloxacin]     Lisinopril Cough        Physical Exam   Vital Signs: Temp: (!) 102.2  F (39  C)   BP: 121/61 Pulse: 118   Resp: 22 SpO2: 95 % O2 Device: None (Room air)    Weight: 175 lbs 0 oz    General Appearance: Patient awake & alert.  No apparent distress.   Respiratory: Lungs are CTAB.  Work of breathing appears normal on room air.  Cardiovascular: Regular rate and rhythm.  No murmurs rubs or gallops.  There is no edema present.  GI: Benign.  Soft.  Non-tender.  Bowel sounds active.   Skin: Flushed.  No rashes or lesions exposed skin.  Neuro:  The patient is moving all extremities.  No obvious focal asymmetries.   Other: Patient is appropriate and oriented x3.     Medical Decision Making       75 MINUTES SPENT BY ME on the date of service doing chart review, history, exam, documentation & further activities per the note.      Data   ------------------------- PAST 24 HR DATA REVIEWED -----------------------------------------------    I have personally reviewed the following data over the past 24 hrs:    8.1  \   13.3   / 379     136 100 20.1 /  130 (H)   4.6 19 (L) 0.76 \     ALT: 51 (H) AST: 30 AP: 107 TBILI: 0.6   ALB: 3.9 TOT PROTEIN: 7.4 LIPASE: 76 (H)     Trop: 9 BNP: N/A     Procal: 0.65 (H) CRP: N/A Lactic Acid: 2.5 (H)         Imaging results reviewed over the past 24 hrs:   Recent Results (from the past 24 hour(s))   CT Chest  (PE) Abdomen Pelvis w Contrast    Narrative    CT CHEST PE ABDOMEN PELVIS W CONTRAST 7/26/2024 4:26 PM    CLINICAL HISTORY: Shortness of breath, cough, abdominal pain, fever  TECHNIQUE: CT angiogram chest and routine CT abdomen pelvis with IV  contrast. Arterial phase through the chest and venous phase through  the abdomen and pelvis. 2D and 3D MIP reconstructions were performed  by the CT technologist. Dose reduction techniques were used.     CONTRAST: 90mL Isovue-370    COMPARISON: Chest CT 7/14/2024    FINDINGS:  ANGIOGRAM CHEST: Pulmonary arteries are normal caliber and negative  for pulmonary emboli. Thoracic aorta is negative for dissection. No CT  evidence of right heart strain.     LUNGS AND PLEURA: Peripheral reticular opacities throughout both lungs  are again noted with scattered groundglass attenuation, most  pronounced in the right lower lobe, grossly stable in comparison to  prior CT. No new consolidation, pleural effusion or pneumothorax.  Interval increase in size of right lower lobe pulmonary nodule,  currently measuring 0.8 cm (series 7 image 119), previously 0.5 cm,  although it measured up to 0.8 cm in June 2024 per report. Minimal  increase in size of left lower lobe pulmonary nodule, currently  measuring 0.5 cm, previously 0.4 cm (series 7 image 154).    MEDIASTINUM/AXILLAE: No suspicious lymphadenopathy. Small hiatal  hernia.    CORONARY ARTERY CALCIFICATION: Severe.    HEPATOBILIARY: Likely punctate hepatic cysts, no specific follow-up  recommended. No evidence of biliary obstruction.    PANCREAS: Normal.    SPLEEN: Normal.    ADRENAL GLANDS: Normal.    KIDNEYS/BLADDER: No hydronephrosis. Urinary bladder is unremarkable.    BOWEL: No obstruction or inflammatory change. Normal appendix.    LYMPH NODES: No suspicious lymphadenopathy.    PELVIC ORGANS: Normal.    OTHER: No abdominal pelvic free fluid. Moderate calcified  atherosclerosis.    MUSCULOSKELETAL: No acute bony abnormality.       Impression    IMPRESSION:  1.  Stable appearance of reticular changes and groundglass attenuation  throughout both lungs, favor sequela of prior infectious/inflammatory  process. No definite new airspace consolidation.  2.  Interval enlargement of pulmonary nodules since CT on 7/14/2024,  although these are reportedly similar in size to a CT in June 2024  which is not currently available for comparison. Recommend follow-up  CT in 3 months to document stability.  3.  No pulmonary embolus.  4.  No acute abnormality in the abdomen or pelvis.    AYUSH LAWRENCE MD         SYSTEM ID:  [id number]

## 2024-07-26 NOTE — ED TRIAGE NOTES
Patient arrives with complaints of feeling generally unwell. Recently hospitalized with pneumonia.

## 2024-07-26 NOTE — ED NOTES
[facility]  ED Nurse Handoff Report    ED Chief complaint: Cough  . ED Diagnosis:   Final diagnoses:   Sepsis without acute organ dysfunction, due to unspecified organism (H)       Allergies:   Allergies   Allergen Reactions    Cellcept [Mycophenolate]     Levaquin [Levofloxacin]     Lisinopril Cough       Code Status: Full Code    Activity level - Baseline/Home:  independent.  Activity Level - Current:   assist of 2.   Lift room needed: No.   Bariatric: No   Needed: No   Isolation: No.   Infection: Not Applicable.     Respiratory status: Room air    Vital Signs (within 30 minutes):   Vitals:    07/26/24 1545 07/26/24 1600 07/26/24 1630 07/26/24 1645   BP: 139/50 134/51 136/55 121/61   Pulse: 114 113 118 118   Resp: 19 19 20 22   Temp:       SpO2: 95% 95% 94% 95%   Weight:       Height:           Cardiac Rhythm:  ,      Pain level:    Patient confused: No.   Patient Falls Risk: patient and family education.   Elimination Status:  not in ED      Patient Report - Initial Complaint: cough.   Focused Assessment: Bernard Dodge is a 68 year old female with history of bronchiolitis obliterans with organizing pneumonia, NSTEMI, CAD, CHF, hypertension, and type 2 diabetes mellitus who presents to the ED with family members for evaluation of cough, nausea, and vomiting. Patient presents with worsening cough since around 1200 earlier today. Reports associated lower abdominal cramping. Per family member, Bernard has been very nauseous and had 4 episodes of vomiting prior to arrival at the ED. She was also febrile at home and now in the ED. She did not take any medications for the fever. Patient endorses chills, urinary frequency yesterday, low back pain, and ongoing constipation. Denies pharyngitis, new rash, dysuria, chest pain, diarrhea, or known sick contacts. Of note, patient was recently hospitalized for pneumonia (discharged 7/19/24).              Abnormal Results:   Labs Ordered and  Resulted from Time of ED Arrival to Time of ED Departure   PROCALCITONIN - Abnormal       Result Value    Procalcitonin 0.65 (*)    LACTIC ACID WHOLE BLOOD WITH 1X REPEAT IN 2 HR WHEN >2 - Abnormal    Lactic Acid, Initial 3.3 (*)    COMPREHENSIVE METABOLIC PANEL - Abnormal    Sodium 136      Potassium 4.6      Carbon Dioxide (CO2) 19 (*)     Anion Gap 17 (*)     Urea Nitrogen 20.1      Creatinine 0.76      GFR Estimate 85      Calcium 9.3      Chloride 100      Glucose 130 (*)     Alkaline Phosphatase 107      AST 30      ALT 51 (*)     Protein Total 7.4      Albumin 3.9      Bilirubin Total 0.6     LIPASE - Abnormal    Lipase 76 (*)    CBC WITH PLATELETS AND DIFFERENTIAL - Abnormal    WBC Count 8.1      RBC Count 4.77      Hemoglobin 13.3      Hematocrit 42.0      MCV 88      MCH 27.9      MCHC 31.7      RDW 13.8      Platelet Count 379      % Neutrophils 96      % Lymphocytes 2      % Monocytes 1      % Eosinophils 0      % Basophils 0      % Immature Granulocytes 1      NRBCs per 100 WBC 0      Absolute Neutrophils 7.7      Absolute Lymphocytes 0.2 (*)     Absolute Monocytes 0.1      Absolute Eosinophils 0.0      Absolute Basophils 0.0      Absolute Immature Granulocytes 0.1      Absolute NRBCs 0.0     BLOOD GAS VENOUS - Abnormal    pH Venous 7.41      pCO2 Venous 39 (*)     pO2 Venous 16 (*)     Bicarbonate Venous 25      Base Excess/Deficit Venous 0.6      FIO2 94      Oxyhemoglobin Venous 24 (*)     O2 Sat, Venous 23.8 (*)    INFLUENZA A/B, RSV, & SARS-COV2 PCR - Normal    Influenza A PCR Negative      Influenza B PCR Negative      RSV PCR Negative      SARS CoV2 PCR Negative     ROUTINE UA WITH MICROSCOPIC REFLEX TO CULTURE   LACTIC ACID WHOLE BLOOD   RESPIRATORY PANEL PCR   BLOOD CULTURE   LEGIONELLA URINARY ANTIGEN AND STREPTOCOCCUS PNEUMONIAE ANTIGEN   RESPIRATORY AEROBIC BACTERIAL CULTURE        CT Chest (PE) Abdomen Pelvis w Contrast   Final Result   IMPRESSION:   1.  Stable appearance of reticular  changes and groundglass attenuation   throughout both lungs, favor sequela of prior infectious/inflammatory   process. No definite new airspace consolidation.   2.  Interval enlargement of pulmonary nodules since CT on 7/14/2024,   although these are reportedly similar in size to a CT in June 2024   which is not currently available for comparison. Recommend follow-up   CT in 3 months to document stability.   3.  No pulmonary embolus.   4.  No acute abnormality in the abdomen or pelvis.      AYUSH LAWRENCE MD            SYSTEM ID:  [id number]          Treatments provided: see MAR  Family Comments: N/A  OBS brochure/video discussed/provided to patient:  N/A  ED Medications:   Medications   ondansetron (ZOFRAN) injection 4 mg (4 mg Intravenous $Given 7/26/24 1530)   lidocaine 1 % 0.1-1 mL (has no administration in time range)   lidocaine (LMX4) cream (has no administration in time range)   sodium chloride (PF) 0.9% PF flush 3 mL (3 mLs Intracatheter Not Given 7/26/24 1608)   sodium chloride (PF) 0.9% PF flush 3 mL (has no administration in time range)   sodium chloride 0.9% BOLUS 1,503 mL (has no administration in time range)   sodium chloride 0.9 % infusion (has no administration in time range)   piperacillin-tazobactam (ZOSYN) 4.5 g vial to attach to  mL bag (0 g Intravenous Stopped 7/26/24 1722)   azithromycin (ZITHROMAX) 500 mg in  mL intermittent infusion (has no administration in time range)   azithromycin (ZITHROMAX) 250 mg in  mL intermittent infusion (has no administration in time range)   CT scan flush (has no administration in time range)   sodium chloride 0.9% BOLUS 1,000 mL (0 mLs Intravenous Stopped 7/26/24 1722)   acetaminophen (TYLENOL) tablet 1,000 mg (1,000 mg Oral $Given 7/26/24 1530)   iopamidol (ISOVUE-370) solution 500 mL (90 mLs Intravenous $Given 7/26/24 1614)       Drips infusing:  Yes  For the majority of the shift this patient was Green.   Interventions performed were  "N/A.    Sepsis treatment initiated: Yes    Per the ED Provider, Time Zero for severe sepsis or septic shock is:  1521    3 Hour Severe Sepsis Bundle Completion:  1. Initial Lactic Acid Result:   Recent Labs   Lab Test 07/26/24  1521 07/14/24  2236 08/23/16  1255   LACT 3.3* 1.5 1.9     2. Blood Cultures before Antibiotics: Yes  3. Broad Spectrum Antibiotics Administered:     Anti-infectives (From now, onward)      Start     Dose/Rate Route Frequency Ordered Stop    07/27/24 1400  azithromycin (ZITHROMAX) 250 mg in  mL intermittent infusion         250 mg  over 1 Hours Intravenous EVERY 24 HOURS 07/26/24 1546 07/31/24 1359    07/26/24 1550  piperacillin-tazobactam (ZOSYN) 4.5 g vial to attach to  mL bag        Note to Pharmacy: For [facility], [facility] and [facility]: For Zosyn-naive patients, use the \"Zosyn initial dose + extended infusion\" order panel.    4.5 g  over 30 Minutes Intravenous EVERY 6 HOURS 07/26/24 1546 08/02/24 1549    07/26/24 1550  azithromycin (ZITHROMAX) 500 mg in  mL intermittent infusion         500 mg  over 1 Hours Intravenous EVERY 24 HOURS 07/26/24 1546 07/27/24 1549          4. 1000 ml of IV fluids have been given so far      6 Hour Severe Sepsis Bundle Completion:    1. Repeat Lactic Acid Level: Last result   Lab Results   Component Value Date    LACT 3.3 (H) 07/26/2024     2. Patient currently on Vasopressors =  No    Cares/treatment/interventions/medications to be completed following ED care: needs UA and sputum sample.     ED Nurse Name: Ranjana Samayoa RN  5:46 PM    RECEIVING UNIT ED HANDOFF REVIEW    Above ED Nurse Handoff Report was reviewed: Yes  Reviewed by: Aisha Penaloza RN on July 26, 2024 at 7:17 PM   [facility] called the ED to inform them the note was read: No       "

## 2024-07-26 NOTE — PHARMACY-ADMISSION MEDICATION HISTORY
Pharmacy Intern Admission Medication History    Admission medication history is complete. The information provided in this note is only as accurate as the sources available at the time of the update.    Information Source(s): Patient and CareEverywhere/SureScripts via in-person    Pertinent Information: Pt started azathioprine 50 mg today.    Changes made to PTA medication list:  Added: azathioprine   Deleted: AUGMENTIN  Changed: Ca Vit 1200 chew->tab    Allergies reviewed with patient and updates made in EHR: yes    Medication History Completed By: Chase Adame 7/26/2024 6:01 PM    PTA Med List   Medication Sig Last Dose    aspirin 81 MG tablet Take 1 tablet (81 mg) by mouth daily 7/26/2024 at am    atorvastatin (LIPITOR) 80 MG tablet Take 80 mg by mouth at bedtime 7/25/2024 at pm    azaTHIOprine (IMURAN) 50 MG tablet Take 50 mg by mouth daily For 2 weeks, then increase to 100 mg daily 7/26/2024 at am    Biotin 68642 MCG TABS Take 1 tablet by mouth daily 7/26/2024 at AM    CALCIUM CARBONATE-VITAMIN D PO Take 1 tablet by mouth daily 7/26/2024 at am    carvedilol (COREG) 3.125 MG tablet Take 1 tablet (3.125 mg) by mouth 2 times daily 7/26/2024 at AM    losartan (COZAAR) 25 MG tablet Take 1 tablet (25 mg) by mouth daily 7/26/2024 at AM    magnesium oxide (MAGNESIUM OXIDE) 400 MG tablet Take 400 mg by mouth at bedtime 7/25/2024 at PM    ondansetron (ZOFRAN ODT) 4 MG ODT tab Take 4 mg by mouth every 6 hours as needed for nausea Unknown at PRN    Probiotic Product (PROBIOTIC PO) Take 1 capsule by mouth at bedtime 7/25/2024 at PM    vitamin C (ASCORBIC ACID) 1000 MG TABS Take 1,000 mg by mouth daily 7/26/2024 at AM

## 2024-07-27 LAB
ANION GAP SERPL CALCULATED.3IONS-SCNC: 12 MMOL/L (ref 7–15)
BUN SERPL-MCNC: 15.2 MG/DL (ref 8–23)
CALCIUM SERPL-MCNC: 7.9 MG/DL (ref 8.8–10.4)
CHLORIDE SERPL-SCNC: 106 MMOL/L (ref 98–107)
CREAT SERPL-MCNC: 0.86 MG/DL (ref 0.51–0.95)
EGFRCR SERPLBLD CKD-EPI 2021: 74 ML/MIN/1.73M2
ERYTHROCYTE [DISTWIDTH] IN BLOOD BY AUTOMATED COUNT: 14.3 % (ref 10–15)
GLUCOSE SERPL-MCNC: 140 MG/DL (ref 70–99)
HCO3 SERPL-SCNC: 19 MMOL/L (ref 22–29)
HCT VFR BLD AUTO: 31.3 % (ref 35–47)
HGB BLD-MCNC: 10.1 G/DL (ref 11.7–15.7)
L PNEUMO1 AG UR QL IA: NEGATIVE
MAGNESIUM SERPL-MCNC: 2.8 MG/DL (ref 1.7–2.3)
MAGNESIUM SERPL-MCNC: <0.2 MG/DL (ref 1.7–2.3)
MCH RBC QN AUTO: 28.5 PG (ref 26.5–33)
MCHC RBC AUTO-ENTMCNC: 32.3 G/DL (ref 31.5–36.5)
MCV RBC AUTO: 88 FL (ref 78–100)
PLATELET # BLD AUTO: 306 10E3/UL (ref 150–450)
POTASSIUM SERPL-SCNC: 3.9 MMOL/L (ref 3.4–5.3)
RBC # BLD AUTO: 3.55 10E6/UL (ref 3.8–5.2)
S PNEUM AG SPEC QL: NEGATIVE
SODIUM SERPL-SCNC: 137 MMOL/L (ref 135–145)
WBC # BLD AUTO: 11.8 10E3/UL (ref 4–11)

## 2024-07-27 PROCEDURE — 80048 BASIC METABOLIC PNL TOTAL CA: CPT | Performed by: STUDENT IN AN ORGANIZED HEALTH CARE EDUCATION/TRAINING PROGRAM

## 2024-07-27 PROCEDURE — 250N000011 HC RX IP 250 OP 636: Performed by: STUDENT IN AN ORGANIZED HEALTH CARE EDUCATION/TRAINING PROGRAM

## 2024-07-27 PROCEDURE — 83735 ASSAY OF MAGNESIUM: CPT | Performed by: INTERNAL MEDICINE

## 2024-07-27 PROCEDURE — 250N000013 HC RX MED GY IP 250 OP 250 PS 637: Performed by: INTERNAL MEDICINE

## 2024-07-27 PROCEDURE — 250N000013 HC RX MED GY IP 250 OP 250 PS 637: Performed by: STUDENT IN AN ORGANIZED HEALTH CARE EDUCATION/TRAINING PROGRAM

## 2024-07-27 PROCEDURE — 99222 1ST HOSP IP/OBS MODERATE 55: CPT | Performed by: SPECIALIST

## 2024-07-27 PROCEDURE — 87899 AGENT NOS ASSAY W/OPTIC: CPT | Performed by: SPECIALIST

## 2024-07-27 PROCEDURE — 99233 SBSQ HOSP IP/OBS HIGH 50: CPT | Performed by: INTERNAL MEDICINE

## 2024-07-27 PROCEDURE — 36415 COLL VENOUS BLD VENIPUNCTURE: CPT | Performed by: SPECIALIST

## 2024-07-27 PROCEDURE — 120N000001 HC R&B MED SURG/OB

## 2024-07-27 PROCEDURE — 85018 HEMOGLOBIN: CPT | Performed by: STUDENT IN AN ORGANIZED HEALTH CARE EDUCATION/TRAINING PROGRAM

## 2024-07-27 PROCEDURE — 36415 COLL VENOUS BLD VENIPUNCTURE: CPT | Performed by: STUDENT IN AN ORGANIZED HEALTH CARE EDUCATION/TRAINING PROGRAM

## 2024-07-27 PROCEDURE — 36415 COLL VENOUS BLD VENIPUNCTURE: CPT | Performed by: INTERNAL MEDICINE

## 2024-07-27 PROCEDURE — 258N000003 HC RX IP 258 OP 636: Performed by: STUDENT IN AN ORGANIZED HEALTH CARE EDUCATION/TRAINING PROGRAM

## 2024-07-27 RX ORDER — LOSARTAN POTASSIUM 25 MG/1
25 TABLET ORAL DAILY
Status: DISCONTINUED | OUTPATIENT
Start: 2024-07-27 | End: 2024-07-29 | Stop reason: HOSPADM

## 2024-07-27 RX ORDER — GLUCOSAMINE/D3/BOSWELLIA SERRA 1500MG-400
1 TABLET ORAL DAILY
Status: DISCONTINUED | OUTPATIENT
Start: 2024-07-27 | End: 2024-07-29 | Stop reason: HOSPADM

## 2024-07-27 RX ORDER — MAGNESIUM OXIDE 400 MG/1
400 TABLET ORAL AT BEDTIME
Status: DISCONTINUED | OUTPATIENT
Start: 2024-07-27 | End: 2024-07-27

## 2024-07-27 RX ORDER — AZATHIOPRINE 50 MG/1
50 TABLET ORAL DAILY
Status: DISCONTINUED | OUTPATIENT
Start: 2024-07-27 | End: 2024-07-29

## 2024-07-27 RX ORDER — MAGNESIUM SULFATE HEPTAHYDRATE 40 MG/ML
4 INJECTION, SOLUTION INTRAVENOUS EVERY 4 HOURS
Status: DISCONTINUED | OUTPATIENT
Start: 2024-07-27 | End: 2024-07-27

## 2024-07-27 RX ORDER — CARVEDILOL 3.12 MG/1
3.12 TABLET ORAL 2 TIMES DAILY
Status: DISCONTINUED | OUTPATIENT
Start: 2024-07-27 | End: 2024-07-29 | Stop reason: HOSPADM

## 2024-07-27 RX ORDER — ATORVASTATIN CALCIUM 40 MG/1
80 TABLET, FILM COATED ORAL AT BEDTIME
Status: DISCONTINUED | OUTPATIENT
Start: 2024-07-27 | End: 2024-07-29 | Stop reason: HOSPADM

## 2024-07-27 RX ORDER — ONDANSETRON 4 MG/1
4 TABLET, ORALLY DISINTEGRATING ORAL EVERY 6 HOURS PRN
Status: DISCONTINUED | OUTPATIENT
Start: 2024-07-27 | End: 2024-07-27

## 2024-07-27 RX ORDER — ASCORBIC ACID 500 MG
1000 TABLET ORAL DAILY
Status: DISCONTINUED | OUTPATIENT
Start: 2024-07-27 | End: 2024-07-29 | Stop reason: HOSPADM

## 2024-07-27 RX ORDER — PREDNISONE 20 MG/1
40 TABLET ORAL DAILY
Status: DISCONTINUED | OUTPATIENT
Start: 2024-07-27 | End: 2024-07-29 | Stop reason: HOSPADM

## 2024-07-27 RX ADMIN — SODIUM CHLORIDE: 9 INJECTION, SOLUTION INTRAVENOUS at 06:16

## 2024-07-27 RX ADMIN — ENOXAPARIN SODIUM 40 MG: 40 INJECTION SUBCUTANEOUS at 22:00

## 2024-07-27 RX ADMIN — ACETAMINOPHEN 650 MG: 325 TABLET, FILM COATED ORAL at 14:24

## 2024-07-27 RX ADMIN — ACETAMINOPHEN 650 MG: 325 TABLET, FILM COATED ORAL at 22:10

## 2024-07-27 RX ADMIN — CARVEDILOL 3.12 MG: 3.12 TABLET, FILM COATED ORAL at 22:01

## 2024-07-27 RX ADMIN — ATORVASTATIN CALCIUM 80 MG: 40 TABLET, FILM COATED ORAL at 00:34

## 2024-07-27 RX ADMIN — DICLOFENAC SODIUM 2 G: 10 GEL TOPICAL at 22:01

## 2024-07-27 RX ADMIN — ATORVASTATIN CALCIUM 80 MG: 40 TABLET, FILM COATED ORAL at 21:59

## 2024-07-27 RX ADMIN — PIPERACILLIN AND TAZOBACTAM 4.5 G: 4; .5 INJECTION, POWDER, FOR SOLUTION INTRAVENOUS at 08:59

## 2024-07-27 RX ADMIN — DICLOFENAC SODIUM 2 G: 10 GEL TOPICAL at 16:35

## 2024-07-27 RX ADMIN — PIPERACILLIN AND TAZOBACTAM 4.5 G: 4; .5 INJECTION, POWDER, FOR SOLUTION INTRAVENOUS at 03:23

## 2024-07-27 RX ADMIN — ASPIRIN 81 MG: 81 TABLET, COATED ORAL at 08:59

## 2024-07-27 RX ADMIN — MAGNESIUM SULFATE HEPTAHYDRATE 4 G: 40 INJECTION, SOLUTION INTRAVENOUS at 00:24

## 2024-07-27 RX ADMIN — ACETAMINOPHEN 650 MG: 325 TABLET, FILM COATED ORAL at 03:22

## 2024-07-27 ASSESSMENT — ACTIVITIES OF DAILY LIVING (ADL)
ADLS_ACUITY_SCORE: 22

## 2024-07-27 NOTE — CONSULTS
[facility]    Infectious Disease Consultation     Date of Admission:  7/26/2024  Date of Consult : 07/27/24    Assessment:  68YF with BOOP, on immunosuppressive therapy with Imuran/prednisone, hospitalized earlier this month for febrile illness without localizing symptoms and had an extensive negative work up for infection , treated with Augmentin for possible pneumonia, who has been admitted with recurrent spiking fevers, elevated procalcitonin and lactate with ongoing concern for infection    -Recurrent fever  -Immunocompromise due to Azathioprine/prednisone use.  -Recent hospitalization for febrile illness with negative work up for infection  -Chronic medical conditions - HTN, hyperlipidemia, CAD, hx of breast cancer, diet controlled diabetes    Recommendations:  No obvious focus of infection, CT appears better  Suspect fever is related to Imuran  Discontinue antibiotics  Consider increasing dose of steroids  Check serum crypto antigen and CMV PCR  All other work up for infection has remained negative  Recommendations were discussed with the hospitalist service    Kelsie Mccullough MD    Reason for Consult   Reason for consult: I was asked to evaluate this patient for Fever.    Primary Care Physician   Kumar Webber    Chief Complaint   Fever    History is obtained from the patient and medical records    History of Present Illness   Bernard Dodge is a 68 year old female  with BOOP, on immunosuppressive therapy with Imuran/prednisone, hospitalized earlier this month for febrile illness without localizing symptoms and had an extensive negative work up for infection (histo, blasto, cocci antigens, beta D glucan, aspergillus galactomannan all negative) , treated with Augmentin for possible pneumonia, who has been admitted with recurrent spiking fevers, elevated procalcitonin and lactate with ongoing concern for infection.    Urine legionella and strep pneumoniae antigens are negative, respiratory PCR  panel is negative, CT shows clearing of ground glass and consolidative opacities and peripheral reticular opacities in both lungs consistent with lung fibrosis and/or scarring. Blood cx is with no growth.    Patient has been maintained on azithromycin and Zosyn and ID has been asked to assist with further management.    Patient notes symptoms have worsened since Imuran dose was increased to n  100 mg  Past Medical History   I have reviewed this patient's medical history and updated it with pertinent information if needed.   Past Medical History:   Diagnosis Date    Benign essential hypertension     BOOP (bronchiolitis obliterans with organizing pneumonia) (H)     post radiation    Breast CA (H)     left side    CAD (coronary artery disease), native coronary artery     STEMI 8/2016  PCI mid LAD 8/22/16    DM (diabetes mellitus screen)     Dyslipidemia     Ex-smoker     Fatty liver disease, nonalcoholic     Fracture of left ankle     Ischemic cardiomyopathy     Sleep apnea     Cpap       Past Surgical History   I have reviewed this patient's surgical history and updated it with pertinent information if needed.  Past Surgical History:   Procedure Laterality Date    CV CORONARY ANGIOGRAM N/A 11/20/2023    Procedure: Coronary Angiogram;  Surgeon: Ivan Goldstein MD;  Location:  [facility]    CV INSTANTANEOUS WAVE-FREE RATIO N/A 11/20/2023    Procedure: Instantaneous Wave-Free Ratio;  Surgeon: Ivan Goldstein MD;  Location:  [facility]    HEART CATH, ANGIOPLASTY      STENT, CORONARY, S660 15/18  08/22/2016    MABEL=LAD       Prior to Admission Medications   Prior to Admission Medications   Prescriptions Last Dose Informant Patient Reported? Taking?   Biotin 64504 MCG TABS 7/26/2024 at AM  Yes Yes   Sig: Take 1 tablet by mouth daily   CALCIUM CARBONATE-VITAMIN D PO 7/26/2024 at am  Yes Yes   Sig: Take 1 tablet by mouth daily   Probiotic Product (PROBIOTIC PO) 7/25/2024 at PM Self Yes  Yes   Sig: Take 1 capsule by mouth at bedtime   aspirin 81 MG tablet 7/26/2024 at am  No Yes   Sig: Take 1 tablet (81 mg) by mouth daily   atorvastatin (LIPITOR) 80 MG tablet 7/25/2024 at pm  Yes Yes   Sig: Take 80 mg by mouth at bedtime   azaTHIOprine (IMURAN) 50 MG tablet 7/26/2024 at am  Yes Yes   Sig: Take 50 mg by mouth daily For 2 weeks, then increase to 100 mg daily   carvedilol (COREG) 3.125 MG tablet 7/26/2024 at AM  No Yes   Sig: Take 1 tablet (3.125 mg) by mouth 2 times daily   losartan (COZAAR) 25 MG tablet 7/26/2024 at AM  No Yes   Sig: Take 1 tablet (25 mg) by mouth daily   magnesium oxide (MAGNESIUM OXIDE) 400 MG tablet 7/25/2024 at PM  Yes Yes   Sig: Take 400 mg by mouth at bedtime   ondansetron (ZOFRAN ODT) 4 MG ODT tab Unknown at PRN  Yes Yes   Sig: Take 4 mg by mouth every 6 hours as needed for nausea   vitamin C (ASCORBIC ACID) 1000 MG TABS 7/26/2024 at AM  Yes Yes   Sig: Take 1,000 mg by mouth daily      Facility-Administered Medications: None     Allergies   Allergies   Allergen Reactions    Cellcept [Mycophenolate]     Levaquin [Levofloxacin]     Lisinopril Cough       Immunization History   Immunization History   Administered Date(s) Administered    COVID-19 12+ (2023-24) (Pfizer) 09/23/2023    COVID-19 MONOVALENT 12+ (Pfizer) 04/28/2021, 05/19/2021    COVID-19 Monovalent 12+ (Pfizer 2022) 02/18/2022       Social History   I have reviewed this patient's social history and updated it with pertinent information if needed. Bernard Dodge  reports that she quit smoking about 23 years ago. Her smoking use included cigarettes. She started smoking about 48 years ago. She has a 25 pack-year smoking history. She has never used smokeless tobacco. She reports current alcohol use. She reports that she does not currently use drugs.    Family History   I have reviewed this patient's family history and updated it with pertinent information if needed.   Family History   Problem Relation Age of Onset     Hypertension Mother     Myocardial Infarction Mother     Heart Disease Father     Diabetes Brother     Heart Disease Brother        Review of Systems   The 10 point Review of Systems is as per HPI    Physical Exam   Temp: 98.2  F (36.8  C) Temp src: Oral BP: 121/50 Pulse: 86   Resp: 18 SpO2: 99 % O2 Device: None (Room air)    Vital Signs with Ranges  Temp:  [98.1  F (36.7  C)-102.5  F (39.2  C)] 98.2  F (36.8  C)  Pulse:  [] 86  Resp:  [17-26] 18  BP: (103-139)/(37-77) 121/50  SpO2:  [93 %-99 %] 99 %  181 lbs 9.6 oz  Body mass index is 33.22 kg/m .    GENERAL APPEARANCE:  awake  EYES: Eyes grossly normal to inspection  NECK: no adenopathy  RESP: lungs clear   CV: regular rates and rhythm  ABDOMEN: soft, nontender  MS: extremities normal  SKIN: no suspicious lesions or rashes    Data   All laboratory data reviewed    Component      Latest Ref Rn 7/27/2024  5:46 AM   Sodium      135 - 145 mmol/L 137    Potassium      3.4 - 5.3 mmol/L 3.9    Chloride      98 - 107 mmol/L 106    Carbon Dioxide (CO2)      22 - 29 mmol/L 19 (L)    Anion Gap      7 - 15 mmol/L 12    Urea Nitrogen      8.0 - 23.0 mg/dL 15.2    Creatinine      0.51 - 0.95 mg/dL 0.86    GFR Estimate      >60 mL/min/1.73m2 74    Calcium      8.8 - 10.4 mg/dL 7.9 (L)    Glucose      70 - 99 mg/dL 140 (H)    WBC      4.0 - 11.0 10e3/uL 11.8 (H)    RBC Count      3.80 - 5.20 10e6/uL 3.55 (L)    Hemoglobin      11.7 - 15.7 g/dL 10.1 (L)    Hematocrit      35.0 - 47.0 % 31.3 (L)    MCV      78 - 100 fL 88    MCH      26.5 - 33.0 pg 28.5    MCHC      31.5 - 36.5 g/dL 32.3    RDW      10.0 - 15.0 % 14.3    Platelet Count      150 - 450 10e3/uL 306       Component      Latest Ref Rn 7/26/2024  3:32 PM   Adenovirus      Not Detected  Not Detected    Coronavirus      Not Detected  Not Detected    Human Metapneumovirus      Not Detected  Not Detected    Human Rhin/Enterovirus      Not Detected  Not Detected    Influenza A      Negative  Negative    Influenza A       Not Detected  Not Detected    Influenza A, H1      Not Detected  Not Detected    Influenza A 2009 H1N1      Not Detected  Not Detected    Influenza A, H3      Not Detected  Not Detected    Influenza B      Negative  Negative    Influenza B      Not Detected  Not Detected    Parainfluenza Virus 1      Not Detected  Not Detected    Parainfluenza Virus 2      Not Detected  Not Detected    Parainfluenza Virus 3      Not Detected  Not Detected    Parainfluenza Virus 4      Not Detected  Not Detected    Respiratory Syncytial Virus A      Not Detected  Not Detected    Respiratory Syncytial Virus B      Not Detected  Not Detected    Chlamydia pneumoniae      Not Detected  Not Detected    Mycoplasma pneumoniae      Not Detected  Not Detected    Resp Syncytial Virus      Negative  Negative    SARS CoV2 PCR      Negative  Negative        Component      Latest Ref Rn 7/26/2024  8:44 PM   L Pneumo Urine Antigen      Negative  Negative    S Pneumoniae Antigen      Negative  Negative      prior  Component      Latest Ref Rn 7/15/2024  3:11 PM 7/15/2024  4:07 PM   Blastomyces Gaby by EIA      <=0.9 IV 0.3     Aspergillus Gaby CF      <1:8  <1:8     Coccidioides Antibody by CF      <1:2  <1:2     Histoplasma Mycelia, CF      <1:8  <1:8     Histoplasma Yeast, CF      <1:8  <1:8     Histoplasma Galactomannan Ag Quant, Urn      ng/mL  Not Detected    Histoplasma Galactomannan Ag Interp, Urn      Not Detected   Not Detected      Component      Latest Ref Rn 7/15/2024  3:11 PM   Anaplasma Phagocytophilum      Not Detected  Not Detected    Babesia Species by PCR      Not Detected  Not Detected    Ehrlichia species by PCR      Not Detected  Not Detected        7/15 histo, blasto, cocci antigens negative    Imaging  CT CHEST PE ABDOMEN PELVIS W CONTRAST 7/26/2024 4:26 PM     CLINICAL HISTORY: Shortness of breath, cough, abdominal pain, fever  TECHNIQUE: CT angiogram chest and routine CT abdomen pelvis with IV  contrast. Arterial phase  through the chest and venous phase through  the abdomen and pelvis. 2D and 3D MIP reconstructions were performed  by the CT technologist. Dose reduction techniques were used.      CONTRAST: 90mL Isovue-370     COMPARISON: Chest CT 7/14/2024     FINDINGS:  ANGIOGRAM CHEST: Pulmonary arteries are normal caliber and negative  for pulmonary emboli. Thoracic aorta is negative for dissection. No CT  evidence of right heart strain.      LUNGS AND PLEURA: Peripheral reticular opacities throughout both lungs  are again noted with scattered groundglass attenuation, most  pronounced in the right lower lobe, grossly stable in comparison to  prior CT. No new consolidation, pleural effusion or pneumothorax.  Interval increase in size of right lower lobe pulmonary nodule,  currently measuring 0.8 cm (series 7 image 119), previously 0.5 cm,  although it measured up to 0.8 cm in June 2024 per report. Minimal  increase in size of left lower lobe pulmonary nodule, currently  measuring 0.5 cm, previously 0.4 cm (series 7 image 154).     MEDIASTINUM/AXILLAE: No suspicious lymphadenopathy. Small hiatal  hernia.     CORONARY ARTERY CALCIFICATION: Severe.     HEPATOBILIARY: Likely punctate hepatic cysts, no specific follow-up  recommended. No evidence of biliary obstruction.     PANCREAS: Normal.     SPLEEN: Normal.     ADRENAL GLANDS: Normal.     KIDNEYS/BLADDER: No hydronephrosis. Urinary bladder is unremarkable.     BOWEL: No obstruction or inflammatory change. Normal appendix.     LYMPH NODES: No suspicious lymphadenopathy.     PELVIC ORGANS: Normal.     OTHER: No abdominal pelvic free fluid. Moderate calcified  atherosclerosis.     MUSCULOSKELETAL: No acute bony abnormality.                                                                      IMPRESSION:  1.  Stable appearance of reticular changes and groundglass attenuation  throughout both lungs, favor sequela of prior infectious/inflammatory  process. No definite new airspace  consolidation.  2.  Interval enlargement of pulmonary nodules since CT on 7/14/2024,  although these are reportedly similar in size to a CT in June 2024  which is not currently available for comparison. Recommend follow-up  CT in 3 months to document stability.  3.  No pulmonary embolus.  4.  No acute abnormality in the abdomen or pelvis.

## 2024-07-27 NOTE — PROGRESS NOTES
[facility]  Hospitalist Progress Note  Franky Nazario M.D., M.B.A.   07/27/2024    Reason for Stay/active problem list    Sepsis          Assessment and Plan:        Summary of Stay: Bernard Dodge is a 68 year old female with PMH significant for BOOP on azathioprine &, prednisone taper radiation blindness, CAD status post STEMI with LAD stent in 2016, hypertension hyperlipidemia, diet controlled diabetes mellitus, breast cancer, and recent hospitalization for unexplained sepsis and fevers who is admitted on 7/26/2024 with recurrent fevers, nausea, vomiting.     Problem List with Assessment and Plan:    Unexplained fever and sepsis:  Patient had a recent hospitalization for same just earlier this month.  She has a known medical history of BOOP for which she has been taking Imuran and prednisone.  She had a broad workup during her last hospitalization which showed negative UA, negative blood cultures, negative respiratory viral panel, negative beta D glucan, negative Histoplasma, negative blastomyces, negative coccidioides, negative tickborne disease.  No infection was found.  She was treated for a suspected pneumonia.  She was discharged with 5 day course of augmentin.  She stopped this 2 days prior to arrival.  Of note, she resumed her imuran morning of presentation and had fever and vomiting within 3 or so hours. We  question if this is truly a return of infection or if we should consider her imuran as a cause of fever & vomiting.  There are case reports of imuran causing fever.  Should also consider malignancy and clot.  No PE on CT scan.    -ID and pulmonary consultation requested   - she was continued with zosyn & azithro   -Held PTA imuran - should consider this a possible cause of recurrent fevers  - currently afebrile , feels weak , no dyspnea at rest . Will continue  abx and await for consultants reconsolidations      Addendum   - I spoke with Dr Mccullough of ID. No major concern for  infection at this time , will stop abx and try po steroid. Pulm consult pending.     Nausea and vomitin-3 hours after restarting imuran.  Associated with fever.  Denies diarrhea or abdominal discomfort.  Possible that she caught some viral infection/gastroenteritis.   -Supportive cares with IVF & meds     AGMA  Lactic acidosis:  Anion gap metabolic acidosis likely related to lactic acidosis in the setting of sepsis.  Lactic acid 3.3 on arrival.  Improved to 2.5 after sepsis fluid bolus.  Likely contribution from recurrent fever.  Patient is also mildly tachycardic and with softer blood pressure.  -Received sepsis fluid in the emergency department  -Continue antibiotics as above.  --IVF stopped due to edema      CAD  HTN  HLD: History LAD STEMI in 2016 requiring PCI.  Had an ischemic cardiomyopathy then that has resolved.  Not chronically on diuretics.  Most recent EF 50-55% last year.  She is on losartan 25 mg daily, carvedilol 3.125 mg twice daily, atorvastatin 80 mg daily, and aspirin 81 mg daily.  -continue  coreg and losartan  with parameters   -Continue ASA & statin     Diet controlled type II DM: A1c has been in the 6 range.  Sliding scale insulin    History of breast cancer: S/p lumpectomy and radiation.      VTE Prophylaxis: Enoxaparin (Lovenox) SQ  Code Status: Full Code  Diet: Combination Diet Regular Diet Adult    Renteria Catheter: Not present        Family updated today:  Yes  ,update discussed with daughter at bed side     Anticipated Disposition : home        Medically Ready for Discharge: Anticipated in 2-4 Days  : pending progress and inputs from ID and pulmonary team           Interval History (Subjective):        Patient is seen and examined by me today and medical record reviewed.Overnight events noted and care discussed with nursing staff.She feels better but has weakness. Denies chest pain , sob or fever                  Physical Exam:        Last Vital Signs:  /50 (BP Location: Left  "arm)   Pulse 86   Temp 98.2  F (36.8  C) (Oral)   Resp 18   Ht 1.575 m (5' 2\")   Wt 82.4 kg (181 lb 9.6 oz)   LMP  (LMP Unknown)   SpO2 99%   BMI 33.22 kg/m      I/O last 3 completed shifts:  In: 3 [I.V.:3]  Out: 1250 [Urine:1250]    Wt Readings from Last 5 Encounters:   07/26/24 82.4 kg (181 lb 9.6 oz)   07/14/24 81.4 kg (179 lb 7.3 oz)   12/07/23 78.9 kg (174 lb)   11/20/23 76.8 kg (169 lb 6.4 oz)   11/06/23 78 kg (172 lb)        Constitutional: Awake, alert, cooperative, no apparent distress     Respiratory: Clear to auscultation bilaterally, no crackles or wheezing   Cardiovascular: Regular rate and rhythm, normal S1 and S2, and no murmur noted   Abdomen: Normal bowel sounds, soft, non-distended, non-tender   Skin: No new rashes, no cyanosis, dry to touch   Neuro: Alert with  no new focal weakness   Extremities: No edema   Other(s):        All other systems: Negative          Medications:        All current medications were reviewed with changes reflected in problem list.         Data:      All new lab and imaging data was reviewed.      Data reviewed today: I reviewed all new labs and imaging results over the last 24 hours. I personally reviewed     Recent Labs   Lab 07/27/24 0546 07/26/24  1521   WBC 11.8* 8.1   HGB 10.1* 13.3   HCT 31.3* 42.0   MCV 88 88    379     No results for input(s): \"CULT\" in the last 168 hours.  Recent Labs   Lab 07/27/24  0753 07/27/24  0546 07/26/24 2003 07/26/24  1521   NA  --  137  --  136   POTASSIUM  --  3.9  --  4.6   CHLORIDE  --  106  --  100   CO2  --  19*  --  19*   ANIONGAP  --  12  --  17*   GLC  --  140*  --  130*   BUN  --  15.2  --  20.1   CR  --  0.86 0.94 0.76   GFRESTIMATED  --  74 66 85   ANTHONY  --  7.9*  --  9.3   MAG 2.8* <0.2* 1.4*  --    PROTTOTAL  --   --   --  7.4   ALBUMIN  --   --   --  3.9   BILITOTAL  --   --   --  0.6   ALKPHOS  --   --   --  107   AST  --   --   --  30   ALT  --   --   --  51*       Recent Labs   Lab 07/27/24  0546 " "07/26/24  1521   * 130*       No results for input(s): \"INR\" in the last 168 hours.      No results for input(s): \"TROPONIN\", \"TROPI\", \"TROPR\" in the last 168 hours.    Invalid input(s): \"TROP\", \"TROPONINIES\"    Recent Results (from the past 48 hour(s))   CT Chest (PE) Abdomen Pelvis w Contrast    Narrative    CT CHEST PE ABDOMEN PELVIS W CONTRAST 7/26/2024 4:26 PM    CLINICAL HISTORY: Shortness of breath, cough, abdominal pain, fever  TECHNIQUE: CT angiogram chest and routine CT abdomen pelvis with IV  contrast. Arterial phase through the chest and venous phase through  the abdomen and pelvis. 2D and 3D MIP reconstructions were performed  by the CT technologist. Dose reduction techniques were used.     CONTRAST: 90mL Isovue-370    COMPARISON: Chest CT 7/14/2024    FINDINGS:  ANGIOGRAM CHEST: Pulmonary arteries are normal caliber and negative  for pulmonary emboli. Thoracic aorta is negative for dissection. No CT  evidence of right heart strain.     LUNGS AND PLEURA: Peripheral reticular opacities throughout both lungs  are again noted with scattered groundglass attenuation, most  pronounced in the right lower lobe, grossly stable in comparison to  prior CT. No new consolidation, pleural effusion or pneumothorax.  Interval increase in size of right lower lobe pulmonary nodule,  currently measuring 0.8 cm (series 7 image 119), previously 0.5 cm,  although it measured up to 0.8 cm in June 2024 per report. Minimal  increase in size of left lower lobe pulmonary nodule, currently  measuring 0.5 cm, previously 0.4 cm (series 7 image 154).    MEDIASTINUM/AXILLAE: No suspicious lymphadenopathy. Small hiatal  hernia.    CORONARY ARTERY CALCIFICATION: Severe.    HEPATOBILIARY: Likely punctate hepatic cysts, no specific follow-up  recommended. No evidence of biliary obstruction.    PANCREAS: Normal.    SPLEEN: Normal.    ADRENAL GLANDS: Normal.    KIDNEYS/BLADDER: No hydronephrosis. Urinary bladder is " unremarkable.    BOWEL: No obstruction or inflammatory change. Normal appendix.    LYMPH NODES: No suspicious lymphadenopathy.    PELVIC ORGANS: Normal.    OTHER: No abdominal pelvic free fluid. Moderate calcified  atherosclerosis.    MUSCULOSKELETAL: No acute bony abnormality.      Impression    IMPRESSION:  1.  Stable appearance of reticular changes and groundglass attenuation  throughout both lungs, favor sequela of prior infectious/inflammatory  process. No definite new airspace consolidation.  2.  Interval enlargement of pulmonary nodules since CT on 7/14/2024,  although these are reportedly similar in size to a CT in June 2024  which is not currently available for comparison. Recommend follow-up  CT in 3 months to document stability.  3.  No pulmonary embolus.  4.  No acute abnormality in the abdomen or pelvis.    AYUSH LAWRENCE MD         SYSTEM ID:  [id number]   US Lower Extremity Venous Duplex Bilateral    Narrative    EXAM: US LOWER EXTREMITY VENOUS DUPLEX BILATERAL  LOCATION: [facility]  DATE: 7/26/2024    INDICATION: Fever. rule out clot  COMPARISON: None.  TECHNIQUE: Venous Duplex ultrasound of bilateral lower extremities with and without compression, augmentation and duplex. Color flow and spectral Doppler with waveform analysis performed.    FINDINGS: Exam includes the common femoral, femoral, popliteal veins as well as segmentally visualized deep calf veins and greater saphenous vein.     RIGHT: No deep vein thrombosis. No superficial thrombophlebitis. No popliteal cyst.    LEFT: No deep vein thrombosis. No superficial thrombophlebitis. No popliteal cyst.      Impression    IMPRESSION:  1.  No deep venous thrombosis in the bilateral lower extremities.       COVID Status:  COVID-19 PCR Results          7/26/2024    15:32   COVID-19 PCR Results   SARS CoV2 PCR Negative      COVID-19 Antibody Results, Testing for Immunity           No data to display                 Disclaimer:  This note consists of symbols derived from keyboarding, dictation and/or voice recognition software. As a result, there may be errors in the script that have gone undetected. Please consider this when interpreting information found in this chart.

## 2024-07-27 NOTE — PLAN OF CARE
pt magnesium this morning was 0.2. pt magnesium was 1.4 last night and was it replaced with magnesium sulfate 4g IV by magnesium protocol. Hospitalist paged and ordered to recheck magnesium again.

## 2024-07-27 NOTE — PLAN OF CARE
"Pt is alert and oriented x4. Pt is on IVD NS @ 150ml/hr. Pt has high temp during the shift. Pt was given Tylenol and Hospitalist paged. Pt is on magnesium and potassium protocol. Magnesium recheck in the morning.  Pt is on Zosyn amd Azithromycin for pneumonia. Pt needs a sputum sample. Pt is on Tele Sinus Tachy. Pt is independent in her room. Pt is sleeping in bed and call light within reach.    Goal Outcome Evaluation:      Plan of Care Reviewed With: patient    Overall Patient Progress: improvingOverall Patient Progress: improving    Outcome Evaluation: pt is alert and oriented x4. pt denies pain. pt magnessium replaced.      Problem: Adult Inpatient Plan of Care  Goal: Plan of Care Review  Description: The Plan of Care Review/Shift note should be completed every shift.  The Outcome Evaluation is a brief statement about your assessment that the patient is improving, declining, or no change.  This information will be displayed automatically on your shift  note.  Outcome: Progressing  Flowsheets (Taken 7/27/2024 0143)  Outcome Evaluation: pt is alert and oriented x4. pt denies pain. pt magnessium replaced.  Plan of Care Reviewed With: patient  Overall Patient Progress: improving  Goal: Patient-Specific Goal (Individualized)  Description: You can add care plan individualizations to a care plan. Examples of Individualization might be:  \"Parent requests to be called daily at 9am for status\", \"I have a hard time hearing out of my right ear\", or \"Do not touch me to wake me up as it startles  me\".  Outcome: Progressing  Goal: Absence of Hospital-Acquired Illness or Injury  Outcome: Progressing  Intervention: Identify and Manage Fall Risk  Recent Flowsheet Documentation  Taken 7/27/2024 0030 by Eulogio Michael RN  Safety Promotion/Fall Prevention:   room near nurse's station   clutter free environment maintained   increased rounding and observation   safety round/check completed   lighting adjusted  Intervention: " Prevent Skin Injury  Recent Flowsheet Documentation  Taken 7/27/2024 0030 by Eulogio Michael RN  Body Position: position changed independently  Intervention: Prevent Infection  Recent Flowsheet Documentation  Taken 7/27/2024 0030 by Eulogio Michael RN  Infection Prevention:   single patient room provided   rest/sleep promoted   hand hygiene promoted  Goal: Optimal Comfort and Wellbeing  Outcome: Progressing  Goal: Readiness for Transition of Care  Outcome: Progressing     Problem: Infection  Goal: Absence of Infection Signs and Symptoms  Outcome: Progressing     Problem: Sepsis/Septic Shock  Goal: Optimal Coping  Outcome: Progressing  Goal: Absence of Bleeding  Outcome: Progressing  Goal: Blood Glucose Level Within Targeted Range  Outcome: Progressing  Goal: Absence of Infection Signs and Symptoms  Outcome: Progressing  Intervention: Initiate Sepsis Management  Recent Flowsheet Documentation  Taken 7/27/2024 0030 by Eulogio Michael RN  Infection Prevention:   single patient room provided   rest/sleep promoted   hand hygiene promoted  Intervention: Promote Recovery  Recent Flowsheet Documentation  Taken 7/27/2024 0030 by Eulogio Michael RN  Activity Management:   activity encouraged   activity adjusted per tolerance  Goal: Optimal Nutrition Intake  Outcome: Progressing     Problem: Skin Injury Risk Increased  Goal: Skin Health and Integrity  Outcome: Progressing  Intervention: Plan: Nurse Driven Intervention: Moisture Management  Recent Flowsheet Documentation  Taken 7/27/2024 0030 by Eulogio Michael RN  Moisture Interventions: Encourage regular toileting  Intervention: Optimize Skin Protection  Recent Flowsheet Documentation  Taken 7/27/2024 0030 by Eulogio Michael RN  Activity Management:   activity encouraged   activity adjusted per tolerance  Head of Bed (HOB) Positioning: HOB at 20-30 degrees

## 2024-07-27 NOTE — PLAN OF CARE
A&Ox4. Tylenol given 1x for headache. Mag and Potassium AM recheck. No fevers this shift.      Goal Outcome Evaluation:      Plan of Care Reviewed With: patient    Overall Patient Progress: improvingOverall Patient Progress: improving    Outcome Evaluation: Tylenol given one time for headache. Mag and Potassium AM recheck.

## 2024-07-27 NOTE — PROGRESS NOTES
Pt visited about CPAP @ HS order. Pt refused, also does not wear a CPAP at home.     Yas Newton, RT

## 2024-07-27 NOTE — PLAN OF CARE
"AO x4. Denies pain. On K, Mg protocols, K recheck in AM, Mg to be replaced. Tele: ST. NS running 150 mL/hr. PRN Tylenol given per pt request, felt chills/feverish though temp 98.3. Urine sample sent to lab. Independent in room. Plan: continue POC IV abx zosyn/zithromax, ID consult, pulmonology consult            Goal Outcome Evaluation:      Plan of Care Reviewed With: patient    Overall Patient Progress: no changeOverall Patient Progress: no change    Outcome Evaluation: pt admit floor 1900. Denies pain, reports chills      Problem: Adult Inpatient Plan of Care  Goal: Plan of Care Review  Description: The Plan of Care Review/Shift note should be completed every shift.  The Outcome Evaluation is a brief statement about your assessment that the patient is improving, declining, or no change.  This information will be displayed automatically on your shift  note.  Outcome: Progressing  Flowsheets (Taken 7/26/2024 2327)  Outcome Evaluation: pt admit floor 1900. Denies pain, reports chills  Plan of Care Reviewed With: patient  Overall Patient Progress: no change  Goal: Patient-Specific Goal (Individualized)  Description: You can add care plan individualizations to a care plan. Examples of Individualization might be:  \"Parent requests to be called daily at 9am for status\", \"I have a hard time hearing out of my right ear\", or \"Do not touch me to wake me up as it startles  me\".  7/26/2024 2327 by Aisha Penaloza RN  Outcome: Progressing  7/26/2024 2327 by Aisha Penaloza RN  Outcome: Progressing  Goal: Absence of Hospital-Acquired Illness or Injury  7/26/2024 2327 by Aisha Penaloza RN  Outcome: Progressing  7/26/2024 2327 by Aisha Penaloza RN  Outcome: Progressing  Intervention: Identify and Manage Fall Risk  Recent Flowsheet Documentation  Taken 7/26/2024 2124 by Aisha Penaloza, RN  Safety Promotion/Fall Prevention:   safety round/check completed   room organization consistent   nonskid shoes/slippers when out of bed   " lighting adjusted  Intervention: Prevent Skin Injury  Recent Flowsheet Documentation  Taken 7/26/2024 2124 by Aisha Penaloza RN  Body Position: position changed independently  Intervention: Prevent Infection  Recent Flowsheet Documentation  Taken 7/26/2024 2124 by Aisha Penaloza RN  Infection Prevention:   rest/sleep promoted   single patient room provided  Goal: Optimal Comfort and Wellbeing  7/26/2024 2327 by Aisha Penaloza RN  Outcome: Progressing  7/26/2024 2327 by Aisha Penaloza RN  Outcome: Progressing  Goal: Readiness for Transition of Care  7/26/2024 2327 by Aisha Penaloza RN  Outcome: Progressing  7/26/2024 2327 by Aisha Penaloza RN  Outcome: Progressing  Intervention: Mutually Develop Transition Plan  Recent Flowsheet Documentation  Taken 7/26/2024 2117 by Aisha Penaloza RN  Equipment Currently Used at Home: none     Problem: Infection  Goal: Absence of Infection Signs and Symptoms  Outcome: Progressing     Problem: Sepsis/Septic Shock  Goal: Optimal Coping  Outcome: Progressing  Goal: Absence of Bleeding  Outcome: Progressing  Goal: Blood Glucose Level Within Targeted Range  Outcome: Progressing  Goal: Absence of Infection Signs and Symptoms  Outcome: Progressing  Intervention: Initiate Sepsis Management  Recent Flowsheet Documentation  Taken 7/26/2024 2124 by Aisha Penaloza RN  Infection Prevention:   rest/sleep promoted   single patient room provided  Intervention: Promote Recovery  Recent Flowsheet Documentation  Taken 7/26/2024 2124 by Aisha Penaloza RN  Activity Management: up ad anup  Goal: Optimal Nutrition Intake  Outcome: Progressing

## 2024-07-28 LAB
ALBUMIN SERPL BCG-MCNC: 2.9 G/DL (ref 3.5–5.2)
ALP SERPL-CCNC: 78 U/L (ref 40–150)
ALT SERPL W P-5'-P-CCNC: 49 U/L (ref 0–50)
ANION GAP SERPL CALCULATED.3IONS-SCNC: 9 MMOL/L (ref 7–15)
AST SERPL W P-5'-P-CCNC: ABNORMAL U/L
BASOPHILS # BLD AUTO: 0 10E3/UL (ref 0–0.2)
BASOPHILS NFR BLD AUTO: 1 %
BILIRUB SERPL-MCNC: 0.5 MG/DL
BUN SERPL-MCNC: 11 MG/DL (ref 8–23)
CALCIUM SERPL-MCNC: 7.9 MG/DL (ref 8.8–10.4)
CHLORIDE SERPL-SCNC: 110 MMOL/L (ref 98–107)
CMV DNA SPEC NAA+PROBE-ACNC: NOT DETECTED IU/ML
CREAT SERPL-MCNC: 0.67 MG/DL (ref 0.51–0.95)
CRYPTOC AG SPEC QL: NEGATIVE
EGFRCR SERPLBLD CKD-EPI 2021: >90 ML/MIN/1.73M2
EOSINOPHIL # BLD AUTO: 0.3 10E3/UL (ref 0–0.7)
EOSINOPHIL NFR BLD AUTO: 6 %
ERYTHROCYTE [DISTWIDTH] IN BLOOD BY AUTOMATED COUNT: 14.5 % (ref 10–15)
GLUCOSE SERPL-MCNC: 120 MG/DL (ref 70–99)
HCO3 SERPL-SCNC: 21 MMOL/L (ref 22–29)
HCT VFR BLD AUTO: 31.6 % (ref 35–47)
HGB BLD-MCNC: 10 G/DL (ref 11.7–15.7)
IMM GRANULOCYTES # BLD: 0 10E3/UL
IMM GRANULOCYTES NFR BLD: 1 %
IRON BINDING CAPACITY (ROCHE): 194 UG/DL (ref 240–430)
IRON BINDING CAPACITY (ROCHE): NORMAL
IRON SATN MFR SERPL: 31 % (ref 15–46)
IRON SATN MFR SERPL: NORMAL %
IRON SERPL-MCNC: 60 UG/DL (ref 37–145)
IRON SERPL-MCNC: NORMAL UG/DL
LACTATE SERPL-SCNC: 1 MMOL/L (ref 0.7–2)
LYMPHOCYTES # BLD AUTO: 0.9 10E3/UL (ref 0.8–5.3)
LYMPHOCYTES NFR BLD AUTO: 16 %
MAGNESIUM SERPL-MCNC: 2.1 MG/DL (ref 1.7–2.3)
MCH RBC QN AUTO: 27.9 PG (ref 26.5–33)
MCHC RBC AUTO-ENTMCNC: 31.6 G/DL (ref 31.5–36.5)
MCV RBC AUTO: 88 FL (ref 78–100)
MONOCYTES # BLD AUTO: 0.4 10E3/UL (ref 0–1.3)
MONOCYTES NFR BLD AUTO: 8 %
NEUTROPHILS # BLD AUTO: 3.8 10E3/UL (ref 1.6–8.3)
NEUTROPHILS NFR BLD AUTO: 70 %
NRBC # BLD AUTO: 0 10E3/UL
NRBC BLD AUTO-RTO: 0 /100
PLATELET # BLD AUTO: 286 10E3/UL (ref 150–450)
POTASSIUM SERPL-SCNC: 4.1 MMOL/L (ref 3.4–5.3)
PROCALCITONIN SERPL IA-MCNC: 17.59 NG/ML
PROT SERPL-MCNC: 5.5 G/DL (ref 6.4–8.3)
RBC # BLD AUTO: 3.59 10E6/UL (ref 3.8–5.2)
SODIUM SERPL-SCNC: 140 MMOL/L (ref 135–145)
WBC # BLD AUTO: 5.5 10E3/UL (ref 4–11)

## 2024-07-28 PROCEDURE — 36415 COLL VENOUS BLD VENIPUNCTURE: CPT | Performed by: INTERNAL MEDICINE

## 2024-07-28 PROCEDURE — 85025 COMPLETE CBC W/AUTO DIFF WBC: CPT | Performed by: INTERNAL MEDICINE

## 2024-07-28 PROCEDURE — 120N000001 HC R&B MED SURG/OB

## 2024-07-28 PROCEDURE — 84075 ASSAY ALKALINE PHOSPHATASE: CPT | Performed by: INTERNAL MEDICINE

## 2024-07-28 PROCEDURE — 250N000012 HC RX MED GY IP 250 OP 636 PS 637: Performed by: INTERNAL MEDICINE

## 2024-07-28 PROCEDURE — 83605 ASSAY OF LACTIC ACID: CPT | Performed by: INTERNAL MEDICINE

## 2024-07-28 PROCEDURE — 83735 ASSAY OF MAGNESIUM: CPT | Performed by: INTERNAL MEDICINE

## 2024-07-28 PROCEDURE — 83550 IRON BINDING TEST: CPT | Performed by: INTERNAL MEDICINE

## 2024-07-28 PROCEDURE — 250N000013 HC RX MED GY IP 250 OP 250 PS 637: Performed by: STUDENT IN AN ORGANIZED HEALTH CARE EDUCATION/TRAINING PROGRAM

## 2024-07-28 PROCEDURE — 84145 PROCALCITONIN (PCT): CPT | Performed by: INTERNAL MEDICINE

## 2024-07-28 PROCEDURE — 250N000013 HC RX MED GY IP 250 OP 250 PS 637: Performed by: INTERNAL MEDICINE

## 2024-07-28 PROCEDURE — 250N000011 HC RX IP 250 OP 636: Performed by: STUDENT IN AN ORGANIZED HEALTH CARE EDUCATION/TRAINING PROGRAM

## 2024-07-28 PROCEDURE — 84155 ASSAY OF PROTEIN SERUM: CPT | Performed by: INTERNAL MEDICINE

## 2024-07-28 PROCEDURE — 99233 SBSQ HOSP IP/OBS HIGH 50: CPT | Performed by: INTERNAL MEDICINE

## 2024-07-28 RX ADMIN — LOSARTAN POTASSIUM 25 MG: 25 TABLET, FILM COATED ORAL at 08:14

## 2024-07-28 RX ADMIN — CARVEDILOL 3.12 MG: 3.12 TABLET, FILM COATED ORAL at 20:20

## 2024-07-28 RX ADMIN — ATORVASTATIN CALCIUM 80 MG: 40 TABLET, FILM COATED ORAL at 20:20

## 2024-07-28 RX ADMIN — DICLOFENAC SODIUM 2 G: 10 GEL TOPICAL at 16:23

## 2024-07-28 RX ADMIN — DICLOFENAC SODIUM 2 G: 10 GEL TOPICAL at 21:23

## 2024-07-28 RX ADMIN — PREDNISONE 40 MG: 20 TABLET ORAL at 08:14

## 2024-07-28 RX ADMIN — ENOXAPARIN SODIUM 40 MG: 40 INJECTION SUBCUTANEOUS at 20:21

## 2024-07-28 RX ADMIN — CARVEDILOL 3.12 MG: 3.12 TABLET, FILM COATED ORAL at 08:14

## 2024-07-28 RX ADMIN — DICLOFENAC SODIUM 2 G: 10 GEL TOPICAL at 08:18

## 2024-07-28 ASSESSMENT — ACTIVITIES OF DAILY LIVING (ADL)
ADLS_ACUITY_SCORE: 22
DEPENDENT_IADLS:: INDEPENDENT
ADLS_ACUITY_SCORE: 22

## 2024-07-28 NOTE — PLAN OF CARE
"A&Ox4. Voltaren gel applied to upper back as scheduled, patient reports this manages her pain well. Minimal trace edema to feet. Checked weight - no weight gain since admission. Patient asked RN to page Dr. Nazario, but states she does not want the MD to order a diuretic at this time of day. Dr. Nazario noted. Patient took first dose of prednisone today, side effects also list swelling.     Goal Outcome Evaluation:           Overall Patient Progress: improvingOverall Patient Progress: improving           Problem: Adult Inpatient Plan of Care  Goal: Plan of Care Review  Description: The Plan of Care Review/Shift note should be completed every shift.  The Outcome Evaluation is a brief statement about your assessment that the patient is improving, declining, or no change.  This information will be displayed automatically on your shift  note.  Outcome: Progressing  Flowsheets (Taken 7/28/2024 1836)  Overall Patient Progress: improving  Goal: Patient-Specific Goal (Individualized)  Description: You can add care plan individualizations to a care plan. Examples of Individualization might be:  \"Parent requests to be called daily at 9am for status\", \"I have a hard time hearing out of my right ear\", or \"Do not touch me to wake me up as it startles  me\".  Outcome: Progressing  Goal: Absence of Hospital-Acquired Illness or Injury  Outcome: Progressing  Intervention: Identify and Manage Fall Risk  Recent Flowsheet Documentation  Taken 7/28/2024 0839 by Jodie Hui RN  Safety Promotion/Fall Prevention:   safety round/check completed   room organization consistent   room near nurse's station   nonskid shoes/slippers when out of bed   clutter free environment maintained   activity supervised  Intervention: Prevent Skin Injury  Recent Flowsheet Documentation  Taken 7/28/2024 0839 by Jodie Hui RN  Body Position: position changed independently  Intervention: Prevent Infection  Recent Flowsheet Documentation  Taken 7/28/2024 " 0839 by Jodie Hui RN  Infection Prevention:   single patient room provided   rest/sleep promoted   hand hygiene promoted  Goal: Optimal Comfort and Wellbeing  Outcome: Progressing  Goal: Readiness for Transition of Care  Outcome: Progressing     Problem: Infection  Goal: Absence of Infection Signs and Symptoms  Outcome: Progressing     Problem: Sepsis/Septic Shock  Goal: Optimal Coping  Outcome: Progressing  Goal: Absence of Bleeding  Outcome: Progressing  Goal: Blood Glucose Level Within Targeted Range  Outcome: Progressing  Goal: Absence of Infection Signs and Symptoms  Outcome: Progressing  Intervention: Initiate Sepsis Management  Recent Flowsheet Documentation  Taken 7/28/2024 0839 by Jodie Hui RN  Infection Prevention:   single patient room provided   rest/sleep promoted   hand hygiene promoted  Intervention: Promote Recovery  Recent Flowsheet Documentation  Taken 7/28/2024 0839 by Jodie Hui RN  Activity Management:   activity adjusted per tolerance   activity encouraged   ambulated to bathroom   back to bed  Goal: Optimal Nutrition Intake  Outcome: Progressing     Problem: Skin Injury Risk Increased  Goal: Skin Health and Integrity  Outcome: Progressing  Intervention: Plan: Nurse Driven Intervention: Moisture Management  Recent Flowsheet Documentation  Taken 7/28/2024 0839 by Jodie Hui RN  Moisture Interventions: Encourage regular toileting  Taken 7/28/2024 0800 by Jodie Hui RN  Moisture Interventions: Encourage regular toileting  Bathing/Skin Care:   dressed/undressed   bath, partial  Intervention: Optimize Skin Protection  Recent Flowsheet Documentation  Taken 7/28/2024 0839 by Jodie Hui RN  Activity Management:   activity adjusted per tolerance   activity encouraged   ambulated to bathroom   back to bed

## 2024-07-28 NOTE — PROGRESS NOTES
[facility]  Hospitalist Progress Note  Franky Nazario M.D., M.B.A.   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     Problem List with Assessment and Plan:    Unexplained fever and sepsis:  Patient had a recent hospitalization for same just earlier this month.  She has a known medical history of BOOP for which she has been taking Imuran and prednisone.  She had a broad workup during her last hospitalization which showed negative UA, negative blood cultures, negative respiratory viral panel, negative beta D glucan, negative Histoplasma, negative blastomyces, negative coccidioides, negative tickborne disease.  No infection was found.  She was treated for a suspected pneumonia.  She was discharged with 5 day course of augmentin.  She stopped this 2 days prior to arrival.  Of note, she resumed her imuran morning of presentation and had fever and vomiting within 3 or so hours. We  question if this is truly a return of infection or if we should consider her imuran as a cause of fever & vomiting.  There are case reports of imuran causing fever.  Should also consider malignancy and clot.  No PE on CT scan.    -ID and pulmonary consultation requested   - she was continued with zosyn & azithro   -Held PTA imuran - should consider this a possible cause of recurrent fevers  -she was seen by ID and Dr Mccullough felt there is convincing evidence of infectious process despite elevated procal. It is possible that imuran can cause fever    --she was started on prednisone on  at 40 mg po daily  --currently afebrile , she is doing well with no complaints.will continue prednisone , await Pulm medicine input       Nausea and vomitin-3 hours after restarting imuran.  Associated with fever.  Denies diarrhea or abdominal discomfort.  Possible that she caught some viral infection/gastroenteritis.   -Supportive cares with IVF & meds     AGMA  Lactic acidosis:  Anion gap metabolic acidosis likely related to lactic acidosis in the setting of sepsis.  Lactic acid 3.3 on arrival.  Improved to 2.5 after sepsis fluid bolus.  Likely contribution from recurrent fever.  Patient is also mildly tachycardic and with softer blood pressure.  -Received sepsis fluid in the emergency department  -Continue antibiotics as above.  --IVF stopped due to edema      CAD  HTN  HLD: History LAD STEMI in 2016 requiring PCI.  Had an ischemic cardiomyopathy then that has resolved.  Not chronically on diuretics.  Most recent EF 50-55% last year.  She is on losartan 25 mg daily, carvedilol 3.125 mg twice daily, atorvastatin 80 mg daily, and aspirin 81 mg daily.  -continue  coreg and losartan  with parameters   -Continue ASA & statin     Diet controlled type II DM: A1c has been in the 6 range.  Sliding scale insulin    History of breast cancer: S/p lumpectomy and radiation.      VTE Prophylaxis: Enoxaparin (Lovenox) SQ  Code Status: Full Code  Diet: Combination Diet Regular Diet Adult    Renteria Catheter: Not present        Family updated today:  Yes  ,update discussed with daughter at bed side     Anticipated Disposition : home        Medically Ready for Discharge: Anticipated Tomorrow    : pending progress and inputs from ID and pulmonary team           Interval History (Subjective):        Patient is seen and examined by me today and medical record reviewed.Overnight events noted and care discussed with nursing staff.She feels better , no fever or chills   Care plan discussed  "with bedside nurse                   Physical Exam:        Last Vital Signs:  BP (!) 141/63   Pulse 76   Temp 97.8  F (36.6  C) (Oral)   Resp 16   Ht 1.575 m (5' 2\")   Wt 82.4 kg (181 lb 9.6 oz)   LMP  (LMP Unknown)   SpO2 95%   BMI 33.22 kg/m      I/O last 3 completed shifts:  In: 603 [P.O.:600; I.V.:3]  Out: -     Wt Readings from Last 5 Encounters:   07/26/24 82.4 kg (181 lb 9.6 oz)   07/14/24 81.4 kg (179 lb 7.3 oz)   12/07/23 78.9 kg (174 lb)   11/20/23 76.8 kg (169 lb 6.4 oz)   11/06/23 78 kg (172 lb)        Constitutional: Awake, alert, cooperative, no apparent distress     Respiratory: Clear to auscultation bilaterally, no crackles or wheezing   Cardiovascular: Regular rate and rhythm, normal S1 and S2, and no murmur noted   Abdomen: Normal bowel sounds, soft, non-distended, non-tender   Skin: No new rashes, no cyanosis, dry to touch   Neuro: Alert with  no new focal weakness   Extremities: No edema   Other(s):        All other systems: Negative          Medications:        All current medications were reviewed with changes reflected in problem list.         Data:      All new lab and imaging data was reviewed.      Data reviewed today: I reviewed all new labs and imaging results over the last 24 hours. I personally reviewed     Recent Labs   Lab 07/28/24  0704 07/27/24 0546 07/26/24  1521   WBC 5.5 11.8* 8.1   HGB 10.0* 10.1* 13.3   HCT 31.6* 31.3* 42.0   MCV 88 88 88    306 379     No results for input(s): \"CULT\" in the last 168 hours.  Recent Labs   Lab 07/28/24  0704 07/27/24  0753 07/27/24  0546 07/26/24 2003 07/26/24  1521 07/26/24  1521     --  137  --   --  136   POTASSIUM 4.1  --  3.9  --   --  4.6   CHLORIDE 110*  --  106  --   --  100   CO2 21*  --  19*  --   --  19*   ANIONGAP 9  --  12  --   --  17*   *  --  140*  --   --  130*   BUN 11.0  --  15.2  --   --  20.1   CR 0.67  --  0.86 0.94  --  0.76   GFRESTIMATED >90  --  74 66  --  85   ANTHONY 7.9*  --  7.9*  --   --  " "9.3   MAG 2.1 2.8* <0.2* 1.4*   < >  --    PROTTOTAL 5.5*  --   --   --   --  7.4   ALBUMIN 2.9*  --   --   --   --  3.9   BILITOTAL 0.5  --   --   --   --  0.6   ALKPHOS 78  --   --   --   --  107   AST  --   --   --   --   --  30   ALT 49  --   --   --   --  51*    < > = values in this interval not displayed.       Recent Labs   Lab 07/28/24  0704 07/27/24  0546 07/26/24  1521   * 140* 130*       No results for input(s): \"INR\" in the last 168 hours.      No results for input(s): \"TROPONIN\", \"TROPI\", \"TROPR\" in the last 168 hours.    Invalid input(s): \"TROP\", \"TROPONINIES\"    Recent Results (from the past 48 hour(s))   CT Chest (PE) Abdomen Pelvis w Contrast    Narrative    CT CHEST PE ABDOMEN PELVIS W CONTRAST 7/26/2024 4:26 PM    CLINICAL HISTORY: Shortness of breath, cough, abdominal pain, fever  TECHNIQUE: CT angiogram chest and routine CT abdomen pelvis with IV  contrast. Arterial phase through the chest and venous phase through  the abdomen and pelvis. 2D and 3D MIP reconstructions were performed  by the CT technologist. Dose reduction techniques were used.     CONTRAST: 90mL [id number]    COMPARISON: Chest CT 7/14/2024    FINDINGS:  ANGIOGRAM CHEST: Pulmonary arteries are normal caliber and negative  for pulmonary emboli. Thoracic aorta is negative for dissection. No CT  evidence of right heart strain.     LUNGS AND PLEURA: Peripheral reticular opacities throughout both lungs  are again noted with scattered groundglass attenuation, most  pronounced in the right lower lobe, grossly stable in comparison to  prior CT. No new consolidation, pleural effusion or pneumothorax.  Interval increase in size of right lower lobe pulmonary nodule,  currently measuring 0.8 cm (series 7 image 119), previously 0.5 cm,  although it measured up to 0.8 cm in June 2024 per report. Minimal  increase in size of left lower lobe pulmonary nodule, currently  measuring 0.5 cm, previously 0.4 cm (series 7 image " 154).    MEDIASTINUM/AXILLAE: No suspicious lymphadenopathy. Small hiatal  hernia.    CORONARY ARTERY CALCIFICATION: Severe.    HEPATOBILIARY: Likely punctate hepatic cysts, no specific follow-up  recommended. No evidence of biliary obstruction.    PANCREAS: Normal.    SPLEEN: Normal.    ADRENAL GLANDS: Normal.    KIDNEYS/BLADDER: No hydronephrosis. Urinary bladder is unremarkable.    BOWEL: No obstruction or inflammatory change. Normal appendix.    LYMPH NODES: No suspicious lymphadenopathy.    PELVIC ORGANS: Normal.    OTHER: No abdominal pelvic free fluid. Moderate calcified  atherosclerosis.    MUSCULOSKELETAL: No acute bony abnormality.      Impression    IMPRESSION:  1.  Stable appearance of reticular changes and groundglass attenuation  throughout both lungs, favor sequela of prior infectious/inflammatory  process. No definite new airspace consolidation.  2.  Interval enlargement of pulmonary nodules since CT on 7/14/2024,  although these are reportedly similar in size to a CT in June 2024  which is not currently available for comparison. Recommend follow-up  CT in 3 months to document stability.  3.  No pulmonary embolus.  4.  No acute abnormality in the abdomen or pelvis.    AYUSH LAWRENCE MD         SYSTEM ID:  [id number]   US Lower Extremity Venous Duplex Bilateral    Narrative    EXAM: US LOWER EXTREMITY VENOUS DUPLEX BILATERAL  LOCATION: [facility]  DATE: 7/26/2024    INDICATION: Fever. rule out clot  COMPARISON: None.  TECHNIQUE: Venous Duplex ultrasound of bilateral lower extremities with and without compression, augmentation and duplex. Color flow and spectral Doppler with waveform analysis performed.    FINDINGS: Exam includes the common femoral, femoral, popliteal veins as well as segmentally visualized deep calf veins and greater saphenous vein.     RIGHT: No deep vein thrombosis. No superficial thrombophlebitis. No popliteal cyst.    LEFT: No deep vein thrombosis. No  superficial thrombophlebitis. No popliteal cyst.      Impression    IMPRESSION:  1.  No deep venous thrombosis in the bilateral lower extremities.       COVID Status:  COVID-19 PCR Results          7/26/2024    15:32   COVID-19 PCR Results   SARS CoV2 PCR Negative      COVID-19 Antibody Results, Testing for Immunity           No data to display                 Disclaimer: This note consists of symbols derived from keyboarding, dictation and/or voice recognition software. As a result, there may be errors in the script that have gone undetected. Please consider this when interpreting information found in this chart.

## 2024-07-28 NOTE — CONSULTS
Care Management Initial Consult    General Information  Assessment completed with: PatientBernard  Type of CM/SW Visit: Initial Assessment    Primary Care Provider verified and updated as needed:     Readmission within the last 30 days: previous discharge plan unsuccessful   Return Category: Medically unsuccessful treatment plan  Reason for Consult: utilization management concerns  Advance Care Planning:       General Information Comments: High URR 22% due to readmission for failed meds    Communication Assessment  Patient's communication style: spoken language (English or Bilingual)    Hearing Difficulty or Deaf: yes   Wear Glasses or Blind: yes    Cognitive  Cognitive/Neuro/Behavioral: WDL                      Living Environment:   People in home: child(zari), adult, spouse     Current living Arrangements: house      Able to return to prior arrangements: yes       Family/Social Support:  Care provided by: self  Provides care for: no one  Marital Status:   , Children  Kumar       Description of Support System: Supportive, Involved    Support Assessment: Adequate family and caregiver support    Equipment currently used at home: none    Does the patient's insurance plan have a 3 day qualifying hospital stay waiver?  No    Lifestyle & Psychosocial Needs:  Social Determinants of Health     Food Insecurity: Not on file   Depression: Not at risk (4/24/2024)    Received from [facility]    PHQ-2     PHQ-2 Score: 0   Housing Stability: Not on file   Tobacco Use: Medium Risk (7/22/2024)    Received from [facility]    Patient History     Smoking Tobacco Use: Former     Smokeless Tobacco Use: Never     Passive Exposure: Not on file   Financial Resource Strain: Not on file   Alcohol Use: Unknown (4/28/2021)    Received from [facility]    AUDIT-C     Frequency of Alcohol Consumption: 4 or more times a week     Average Number of Drinks: 1 or 2     Frequency of Binge Drinking: Not on file    Transportation Needs: Not on file   Physical Activity: Not on file   Interpersonal Safety: Not on file   Stress: Not on file   Social Connections: Not on file   Health Literacy: Not on file       Functional Status:  Prior to admission patient needed assistance:   Dependent ADLs:: Independent  Dependent IADLs:: Independent  Assesssment of Functional Status: At functional baseline      Care Management Discharge Note    Discharge Date: 07/29/2024       Discharge Disposition: Home    Discharge Services: None    Discharge DME: None    Discharge Transportation: family or friend will provide    Private pay costs discussed: Not applicable    Does the patient's insurance plan have a 3 day qualifying hospital stay waiver?  No      Additional Information:  GAURI completed initial consult with Bernard at bedside for high URR at 22%.   Bernard reported that she currently lives at home with spouse and has grown children.    Bernard receives plenty of help at home and needs no services or support.    Bernard reported that she was a readmit for medication that was not successful.    Consult cleared for no needs.       Cielo Dao, [facility] - [phone]

## 2024-07-28 NOTE — PLAN OF CARE
"Pt is alert and oriented x4. Pt is on magnesium and potassium protocol. recheck in the morning. Pt has trace edema on both legs. Vitals stable. No acute distress noted. Pt needs a sputum sample. Pt triggered sepsis during the shift and Lactic acid was 1.0. Pt is on Tele SR. Pt is independent in her room. Pt is sleeping in bed and call light within reach.         Goal Outcome Evaluation:      Plan of Care Reviewed With: patient    Overall Patient Progress: improvingOverall Patient Progress: improving    Outcome Evaluation: pt denies pain pt vitals stable.      Problem: Adult Inpatient Plan of Care  Goal: Plan of Care Review  Description: The Plan of Care Review/Shift note should be completed every shift.  The Outcome Evaluation is a brief statement about your assessment that the patient is improving, declining, or no change.  This information will be displayed automatically on your shift  note.  Outcome: Progressing  Flowsheets (Taken 7/28/2024 0534)  Outcome Evaluation: pt denies pain pt vitals stable.  Plan of Care Reviewed With: patient  Overall Patient Progress: improving  Goal: Patient-Specific Goal (Individualized)  Description: You can add care plan individualizations to a care plan. Examples of Individualization might be:  \"Parent requests to be called daily at 9am for status\", \"I have a hard time hearing out of my right ear\", or \"Do not touch me to wake me up as it startles  me\".  Outcome: Progressing  Goal: Absence of Hospital-Acquired Illness or Injury  Outcome: Progressing  Intervention: Identify and Manage Fall Risk  Recent Flowsheet Documentation  Taken 7/28/2024 0017 by Eulogio Michael RN  Safety Promotion/Fall Prevention:   clutter free environment maintained   increased rounding and observation   increase visualization of patient   patient and family education   nonskid shoes/slippers when out of bed   safety round/check completed  Intervention: Prevent Skin Injury  Recent Flowsheet " Documentation  Taken 7/28/2024 0017 by Eulogio Michael RN  Body Position: position changed independently  Intervention: Prevent Infection  Recent Flowsheet Documentation  Taken 7/28/2024 0017 by Eulogio Michael RN  Infection Prevention:   single patient room provided   rest/sleep promoted   hand hygiene promoted  Goal: Optimal Comfort and Wellbeing  Outcome: Progressing  Goal: Readiness for Transition of Care  Outcome: Progressing     Problem: Infection  Goal: Absence of Infection Signs and Symptoms  Outcome: Progressing     Problem: Sepsis/Septic Shock  Goal: Optimal Coping  Outcome: Progressing  Goal: Absence of Bleeding  Outcome: Progressing  Goal: Blood Glucose Level Within Targeted Range  Outcome: Progressing  Goal: Absence of Infection Signs and Symptoms  Outcome: Progressing  Intervention: Initiate Sepsis Management  Recent Flowsheet Documentation  Taken 7/28/2024 0017 by Eulogio Michael RN  Infection Prevention:   single patient room provided   rest/sleep promoted   hand hygiene promoted  Intervention: Promote Recovery  Recent Flowsheet Documentation  Taken 7/28/2024 0017 by Eulogio Mcihael RN  Activity Management:   activity encouraged   activity adjusted per tolerance  Goal: Optimal Nutrition Intake  Outcome: Progressing     Problem: Skin Injury Risk Increased  Goal: Skin Health and Integrity  Outcome: Progressing  Intervention: Plan: Nurse Driven Intervention: Moisture Management  Recent Flowsheet Documentation  Taken 7/28/2024 0017 by Eulogio Michael RN  Moisture Interventions: Encourage regular toileting  Intervention: Optimize Skin Protection  Recent Flowsheet Documentation  Taken 7/28/2024 0017 by Eulogio Michael RN  Activity Management:   activity encouraged   activity adjusted per tolerance  Head of Bed (HOB) Positioning: HOB at 20-30 degrees

## 2024-07-28 NOTE — PLAN OF CARE
"AO x4. No fever this shift. On K, Mg protocols, rechecks in AM. Tele: SR. Reported upper back ache, Voltaren scheduled. PRN tylenol given per pt request. Generalized edema, improving. IV abx discontinued. Independent in room. Plan: sputum sample still needed, awaiting pulmonary consult.            Goal Outcome Evaluation:      Plan of Care Reviewed With: patient    Overall Patient Progress: improvingOverall Patient Progress: improving    Outcome Evaluation: Voltaren relieved back ache. Tele: SR.      Problem: Adult Inpatient Plan of Care  Goal: Plan of Care Review  Description: The Plan of Care Review/Shift note should be completed every shift.  The Outcome Evaluation is a brief statement about your assessment that the patient is improving, declining, or no change.  This information will be displayed automatically on your shift  note.  Outcome: Progressing  Flowsheets (Taken 7/27/2024 2013)  Outcome Evaluation: Voltaren relieved back ache. Tele: SR.  Plan of Care Reviewed With: patient  Overall Patient Progress: improving  Goal: Patient-Specific Goal (Individualized)  Description: You can add care plan individualizations to a care plan. Examples of Individualization might be:  \"Parent requests to be called daily at 9am for status\", \"I have a hard time hearing out of my right ear\", or \"Do not touch me to wake me up as it startles  me\".  Outcome: Progressing  Goal: Absence of Hospital-Acquired Illness or Injury  Outcome: Progressing  Intervention: Identify and Manage Fall Risk  Recent Flowsheet Documentation  Taken 7/27/2024 1637 by Aisha Penaloza RN  Safety Promotion/Fall Prevention:   safety round/check completed   room organization consistent   nonskid shoes/slippers when out of bed   lighting adjusted  Intervention: Prevent Skin Injury  Recent Flowsheet Documentation  Taken 7/27/2024 1637 by Aisha Penaloza RN  Body Position: position changed independently  Intervention: Prevent Infection  Recent Flowsheet " Documentation  Taken 7/27/2024 1637 by Aisha Penaloza RN  Infection Prevention:   rest/sleep promoted   single patient room provided  Goal: Optimal Comfort and Wellbeing  Outcome: Progressing  Intervention: Monitor Pain and Promote Comfort  Recent Flowsheet Documentation  Taken 7/27/2024 1538 by Aisha Penaloza RN  Pain Management Interventions:   rest   repositioned  Goal: Readiness for Transition of Care  Outcome: Progressing     Problem: Infection  Goal: Absence of Infection Signs and Symptoms  Outcome: Progressing     Problem: Sepsis/Septic Shock  Goal: Optimal Coping  Outcome: Progressing  Goal: Absence of Bleeding  Outcome: Progressing  Goal: Blood Glucose Level Within Targeted Range  Outcome: Progressing  Goal: Absence of Infection Signs and Symptoms  Outcome: Progressing  Intervention: Initiate Sepsis Management  Recent Flowsheet Documentation  Taken 7/27/2024 1637 by Aisha Penaloza RN  Infection Prevention:   rest/sleep promoted   single patient room provided  Intervention: Promote Recovery  Recent Flowsheet Documentation  Taken 7/27/2024 1637 by Aisha Penaloza RN  Activity Management: up ad anup  Goal: Optimal Nutrition Intake  Outcome: Progressing     Problem: Skin Injury Risk Increased  Goal: Skin Health and Integrity  Outcome: Progressing  Intervention: Plan: Nurse Driven Intervention: Moisture Management  Recent Flowsheet Documentation  Taken 7/27/2024 1637 by Aisha Pnealoza RN  Moisture Interventions: Encourage regular toileting  Intervention: Optimize Skin Protection  Recent Flowsheet Documentation  Taken 7/27/2024 1637 by Aisha Penaloza RN  Activity Management: up ad anup  Head of Bed (HOB) Positioning: HOB at 15 degrees

## 2024-07-29 VITALS
HEIGHT: 62 IN | HEART RATE: 68 BPM | BODY MASS INDEX: 33.2 KG/M2 | OXYGEN SATURATION: 94 % | RESPIRATION RATE: 16 BRPM | WEIGHT: 180.4 LBS | SYSTOLIC BLOOD PRESSURE: 164 MMHG | TEMPERATURE: 98.1 F | DIASTOLIC BLOOD PRESSURE: 57 MMHG

## 2024-07-29 LAB
ANION GAP SERPL CALCULATED.3IONS-SCNC: 12 MMOL/L (ref 7–15)
ATRIAL RATE - MUSE: 112 BPM
BASOPHILS # BLD AUTO: 0 10E3/UL (ref 0–0.2)
BASOPHILS NFR BLD AUTO: 1 %
BUN SERPL-MCNC: 9.1 MG/DL (ref 8–23)
CALCIUM SERPL-MCNC: 8.8 MG/DL (ref 8.8–10.4)
CHLORIDE SERPL-SCNC: 112 MMOL/L (ref 98–107)
CREAT SERPL-MCNC: 0.65 MG/DL (ref 0.51–0.95)
CRP SERPL-MCNC: 62.73 MG/L
DIASTOLIC BLOOD PRESSURE - MUSE: NORMAL MMHG
EGFRCR SERPLBLD CKD-EPI 2021: >90 ML/MIN/1.73M2
EOSINOPHIL # BLD AUTO: 0.1 10E3/UL (ref 0–0.7)
EOSINOPHIL NFR BLD AUTO: 1 %
ERYTHROCYTE [DISTWIDTH] IN BLOOD BY AUTOMATED COUNT: 13.9 % (ref 10–15)
GLUCOSE SERPL-MCNC: 122 MG/DL (ref 70–99)
HCO3 SERPL-SCNC: 20 MMOL/L (ref 22–29)
HCT VFR BLD AUTO: 32.5 % (ref 35–47)
HGB BLD-MCNC: 10.4 G/DL (ref 11.7–15.7)
IMM GRANULOCYTES # BLD: 0 10E3/UL
IMM GRANULOCYTES NFR BLD: 0 %
INTERPRETATION ECG - MUSE: NORMAL
LYMPHOCYTES # BLD AUTO: 1.5 10E3/UL (ref 0.8–5.3)
LYMPHOCYTES NFR BLD AUTO: 21 %
MAGNESIUM SERPL-MCNC: 1.8 MG/DL (ref 1.7–2.3)
MCH RBC QN AUTO: 27.7 PG (ref 26.5–33)
MCHC RBC AUTO-ENTMCNC: 32 G/DL (ref 31.5–36.5)
MCV RBC AUTO: 86 FL (ref 78–100)
MONOCYTES # BLD AUTO: 0.6 10E3/UL (ref 0–1.3)
MONOCYTES NFR BLD AUTO: 9 %
NEUTROPHILS # BLD AUTO: 4.7 10E3/UL (ref 1.6–8.3)
NEUTROPHILS NFR BLD AUTO: 68 %
NRBC # BLD AUTO: 0 10E3/UL
NRBC BLD AUTO-RTO: 0 /100
P AXIS - MUSE: 62 DEGREES
PLATELET # BLD AUTO: 296 10E3/UL (ref 150–450)
POTASSIUM SERPL-SCNC: 3.8 MMOL/L (ref 3.4–5.3)
PR INTERVAL - MUSE: 150 MS
PROCALCITONIN SERPL IA-MCNC: 11.7 NG/ML
QRS DURATION - MUSE: 70 MS
QT - MUSE: 312 MS
QTC - MUSE: 425 MS
R AXIS - MUSE: 10 DEGREES
RBC # BLD AUTO: 3.76 10E6/UL (ref 3.8–5.2)
SODIUM SERPL-SCNC: 144 MMOL/L (ref 135–145)
SYSTOLIC BLOOD PRESSURE - MUSE: NORMAL MMHG
T AXIS - MUSE: -39 DEGREES
VENTRICULAR RATE- MUSE: 112 BPM
WBC # BLD AUTO: 7 10E3/UL (ref 4–11)

## 2024-07-29 PROCEDURE — 99223 1ST HOSP IP/OBS HIGH 75: CPT | Performed by: STUDENT IN AN ORGANIZED HEALTH CARE EDUCATION/TRAINING PROGRAM

## 2024-07-29 PROCEDURE — 99207 PR NO BILLABLE SERVICE THIS VISIT: CPT | Performed by: INTERNAL MEDICINE

## 2024-07-29 PROCEDURE — 86140 C-REACTIVE PROTEIN: CPT | Performed by: INTERNAL MEDICINE

## 2024-07-29 PROCEDURE — 80048 BASIC METABOLIC PNL TOTAL CA: CPT | Performed by: INTERNAL MEDICINE

## 2024-07-29 PROCEDURE — 83735 ASSAY OF MAGNESIUM: CPT | Performed by: INTERNAL MEDICINE

## 2024-07-29 PROCEDURE — 250N000012 HC RX MED GY IP 250 OP 636 PS 637: Performed by: INTERNAL MEDICINE

## 2024-07-29 PROCEDURE — 85025 COMPLETE CBC W/AUTO DIFF WBC: CPT | Performed by: INTERNAL MEDICINE

## 2024-07-29 PROCEDURE — 99239 HOSP IP/OBS DSCHRG MGMT >30: CPT | Performed by: INTERNAL MEDICINE

## 2024-07-29 PROCEDURE — 36415 COLL VENOUS BLD VENIPUNCTURE: CPT | Performed by: INTERNAL MEDICINE

## 2024-07-29 PROCEDURE — 99232 SBSQ HOSP IP/OBS MODERATE 35: CPT | Performed by: INTERNAL MEDICINE

## 2024-07-29 PROCEDURE — 250N000013 HC RX MED GY IP 250 OP 250 PS 637: Performed by: INTERNAL MEDICINE

## 2024-07-29 PROCEDURE — 250N000013 HC RX MED GY IP 250 OP 250 PS 637: Performed by: STUDENT IN AN ORGANIZED HEALTH CARE EDUCATION/TRAINING PROGRAM

## 2024-07-29 PROCEDURE — 84145 PROCALCITONIN (PCT): CPT | Performed by: INTERNAL MEDICINE

## 2024-07-29 RX ORDER — PREDNISONE 5 MG/1
TABLET ORAL
Qty: 112 TABLET | Refills: 0 | Status: SHIPPED | OUTPATIENT
Start: 2024-07-29

## 2024-07-29 RX ORDER — SULFAMETHOXAZOLE AND TRIMETHOPRIM 400; 80 MG/1; MG/1
1 TABLET ORAL DAILY
Qty: 30 TABLET | Refills: 0 | Status: SHIPPED | OUTPATIENT
Start: 2024-07-29

## 2024-07-29 RX ADMIN — Medication 10 MG: at 02:26

## 2024-07-29 RX ADMIN — PREDNISONE 40 MG: 20 TABLET ORAL at 08:57

## 2024-07-29 RX ADMIN — Medication 1 MG: at 01:01

## 2024-07-29 RX ADMIN — LOSARTAN POTASSIUM 25 MG: 25 TABLET, FILM COATED ORAL at 08:57

## 2024-07-29 RX ADMIN — CARVEDILOL 3.12 MG: 3.12 TABLET, FILM COATED ORAL at 08:57

## 2024-07-29 ASSESSMENT — ACTIVITIES OF DAILY LIVING (ADL)
ADLS_ACUITY_SCORE: 22

## 2024-07-29 NOTE — PLAN OF CARE
"Goal Outcome Evaluation:      Plan of Care Reviewed With: patient    Overall Patient Progress: improvingOverall Patient Progress: improving    Outcome Evaluation: pt is alert and oriented x4, independent , VSS, independenty in the room, pt could not sleep got order of 10 mg melatonin that she takes at home.      Problem: Adult Inpatient Plan of Care  Goal: Plan of Care Review  Description: The Plan of Care Review/Shift note should be completed every shift.  The Outcome Evaluation is a brief statement about your assessment that the patient is improving, declining, or no change.  This information will be displayed automatically on your shift  note.  Outcome: Progressing  Flowsheets (Taken 7/29/2024 0520)  Outcome Evaluation: pt is alert and oriented x4, independent , VSS, independenty in the room, pt could not sleep got order of 10 mg melatonin that she takes at home.  Plan of Care Reviewed With: patient  Overall Patient Progress: improving  Goal: Patient-Specific Goal (Individualized)  Description: You can add care plan individualizations to a care plan. Examples of Individualization might be:  \"Parent requests to be called daily at 9am for status\", \"I have a hard time hearing out of my right ear\", or \"Do not touch me to wake me up as it startles  me\".  Outcome: Progressing  Goal: Absence of Hospital-Acquired Illness or Injury  Outcome: Progressing  Intervention: Identify and Manage Fall Risk  Recent Flowsheet Documentation  Taken 7/29/2024 0105 by Jeanette Crowell, RN  Safety Promotion/Fall Prevention:   clutter free environment maintained   lighting adjusted   nonskid shoes/slippers when out of bed  Intervention: Prevent Skin Injury  Recent Flowsheet Documentation  Taken 7/29/2024 0105 by Jeanette Crowell RN  Body Position: position changed independently  Device Skin Pressure Protection: absorbent pad utilized/changed  Intervention: Prevent and Manage VTE (Venous Thromboembolism) Risk  Recent Flowsheet " Documentation  Taken 7/29/2024 0105 by Jeanette Crowell RN  VTE Prevention/Management: SCDs off (sequential compression devices)  Intervention: Prevent Infection  Recent Flowsheet Documentation  Taken 7/29/2024 0105 by Jeanette Crowell RN  Infection Prevention: single patient room provided  Goal: Optimal Comfort and Wellbeing  Outcome: Progressing  Goal: Readiness for Transition of Care  Outcome: Progressing     Problem: Infection  Goal: Absence of Infection Signs and Symptoms  Outcome: Progressing     Problem: Sepsis/Septic Shock  Goal: Optimal Coping  Outcome: Progressing  Goal: Absence of Bleeding  Outcome: Progressing  Goal: Blood Glucose Level Within Targeted Range  Outcome: Progressing  Goal: Absence of Infection Signs and Symptoms  Outcome: Progressing  Intervention: Initiate Sepsis Management  Recent Flowsheet Documentation  Taken 7/29/2024 0105 by Jeanette Crowell RN  Infection Prevention: single patient room provided  Intervention: Promote Recovery  Recent Flowsheet Documentation  Taken 7/29/2024 0105 by Jeanette Crowell RN  Activity Management: activity adjusted per tolerance  Goal: Optimal Nutrition Intake  Outcome: Progressing     Problem: Skin Injury Risk Increased  Goal: Skin Health and Integrity  Outcome: Progressing  Intervention: Plan: Nurse Driven Intervention: Moisture Management  Recent Flowsheet Documentation  Taken 7/29/2024 0105 by Jeanette Crowell RN  Moisture Interventions: Encourage regular toileting  Intervention: Optimize Skin Protection  Recent Flowsheet Documentation  Taken 7/29/2024 0105 by Jeanette Crowell RN  Activity Management: activity adjusted per tolerance

## 2024-07-29 NOTE — PROGRESS NOTES
"BP (!) 164/57 (BP Location: Right arm)   Pulse 68   Temp 98.1  F (36.7  C) (Oral)   Resp 16   Ht 1.575 m (5' 2\")   Wt 81.8 kg (180 lb 6.4 oz)   LMP  (LMP Unknown)   SpO2 94%   BMI 33.00 kg/m      AVS reviewed with patient. Patient is in stable condition, VSS, no co pain/cp/sob. All discharge education reviewed with pt in regards to: diet, activity, safety, s/s to report, medications and rx, follow up appointments/care.  All questions answered. Pt denies any further questions or concerns. PIV removed. No complications. All belongings returned. Pt escorted to front door by [facility] staff. Will follow up with pulmonology outpt.    "

## 2024-07-29 NOTE — DISCHARGE SUMMARY
Physician Discharge Summary           [facility]  Hospitalist Discharge Summary-[facility]    Name: Bernard Dodge    MRN: [id number]     YOB: 1955    Age: 68 year old                                                     Primary care provider: Kumar Webber    Admit date:  7/26/2024    Discharge date and time: 7/29/2024    Discharge Physician: Franky Nazario M.D., M.B.A.       Primary Discharge Diagnosis      Acute febrile illness   Concern for sepsis     Secondary Diagnosis /chronic medical conditions     Past Medical History:   Diagnosis Date    Benign essential hypertension     BOOP (bronchiolitis obliterans with organizing pneumonia) (H)     post radiation    Breast CA (H)     left side    CAD (coronary artery disease), native coronary artery     STEMI 8/2016  PCI mid LAD 8/22/16    DM (diabetes mellitus screen)     Dyslipidemia     Ex-smoker     Fatty liver disease, nonalcoholic     Fracture of left ankle     Ischemic cardiomyopathy     Sleep apnea     Cpap     Past Surgical History:  Past Surgical History:   Procedure Laterality Date    CV CORONARY ANGIOGRAM N/A 11/20/2023    Procedure: Coronary Angiogram;  Surgeon: Ivan Goldstein MD;  Location:  HEART CARDIAC CATH LAB    CV INSTANTANEOUS WAVE-FREE RATIO N/A 11/20/2023    Procedure: Instantaneous Wave-Free Ratio;  Surgeon: Ivan Goldstein MD;  Location:  HEART CARDIAC CATH LAB    HEART CATH, ANGIOPLASTY      STENT, CORONARY, S660 15/18  08/22/2016    MABEL=LAD           Brief Summary of Hospital stay :       Please refer to  Admission H&P note  and subsequent progress notes in EMR for full details of patient care.    Reason for Presentation to the Hospital: fever     Brief Summary of Significant findings(Primary diagnosis )Procedures and treatments provided(Hospital course ,consults, procedures)     Bernard Dodge is a 68 year old female with PMH significant for BOOP on azathioprine &, prednisone taper  radiation blindness, CAD status post STEMI with LAD stent in 2016, hypertension hyperlipidemia, diet controlled diabetes mellitus, breast cancer, and recent hospitalization for unexplained sepsis and fevers who is admitted on 7/26/2024 with recurrent fevers, nausea, vomiting.      Problem list (medical problems addressed during hospital stay):    Unexplained fever and sepsis:  Patient had a recent hospitalization for same just earlier this month.  She has a known medical history of BOOP for which she has been taking Imuran and prednisone.  She had a broad workup during her last hospitalization which showed negative UA, negative blood cultures, negative respiratory viral panel, negative beta D glucan, negative Histoplasma, negative blastomyces, negative coccidioides, negative tickborne disease.  No infection was found.  She was treated for a suspected pneumonia.  She was discharged with 5 day course of augmentin.  She stopped this 2 days prior to arrival.  Of note, she resumed her imuran morning of presentation and had fever and vomiting within 3 or so hours. We  question if this is truly a return of infection or if we should consider her imuran as a cause of fever & vomiting.  There are case reports of imuran causing fever.  Should also consider malignancy and clot.  No PE on CT scan.    -ID and pulmonary consultation requested   - she was continued with zosyn & azithro   -Held PTA imuran - should consider this a possible cause of recurrent fevers  -she was seen by ID and Dr Mccullough felt there is convincing evidence of infectious process despite elevated procal. It is possible that imuran can cause fever   --she was started on prednisone on 7/27 at 40 mg po daily  -- Patient remained afebrile after prednisone.  Pulmonary medicine and infectious disease was consulted and recommendations obtained.  Despite significant elevation of procalcitonin, there is no indication of infectious process on workup for infectious  "disease.  Patient was recommended to continue prednisone with slow taper that includes 40 mg prednisone daily for 2 weeks to be tapered by 5 mg every couple weeks.  She was also prescribed 1 single strength Bactrim for emergency infection prophylaxis.  On the day of discharge, patient was awake alert and oriented x 3.  She was hemodynamically stable, afebrile.  I spoke with the patient's care plan with patient and her daughter Sahara.  Their questions and concerns addressed.          Nausea and vomitin-3 hours after restarting imuran.  Associated with fever.  Denies diarrhea or abdominal discomfort.  Possible that she caught some viral infection/gastroenteritis.   -Supportive cares with IVF & meds     AGMA  Lactic acidosis:  Anion gap metabolic acidosis likely related to lactic acidosis in the setting of sepsis.  Lactic acid 3.3 on arrival.  Improved to 2.5 after sepsis fluid bolus.  Likely contribution from recurrent fever.  Patient is also mildly tachycardic and with softer blood pressure.  -Received sepsis fluid in the emergency department  -Continue antibiotics as above.  --IVF stopped due to edema      CAD  HTN  HLD: History LAD STEMI in 2016 requiring PCI.  Had an ischemic cardiomyopathy then that has resolved.  Not chronically on diuretics.  Most recent EF 50-55% last year.  She is on losartan 25 mg daily, carvedilol 3.125 mg twice daily, atorvastatin 80 mg daily, and aspirin 81 mg daily.  -continue  coreg and losartan  with parameters   -Continue ASA & statin     Diet controlled type II DM: A1c has been in the 6 range.  Sliding scale insulin    History of breast cancer: S/p lumpectomy and radiation.        Consultations during hospital stay:       PULMONARY IP CONSULT  INFECTIOUS DISEASES IP CONSULT  CARE MANAGEMENT / SOCIAL WORK IP CONSULT      Patient discharge Condition:     stable    /58 (BP Location: Left arm)   Pulse 64   Temp 97.3  F (36.3  C) (Oral)   Resp 16   Ht 1.575 m (5' 2\")   Wt " 81.8 kg (180 lb 6.4 oz)   LMP  (LMP Unknown)   SpO2 97%   BMI 33.00 kg/m         Discharge Instructions:       Patient/family instructions: Written discharge instruction given to patient/family    Discharge Medications:       Review of your medicines        START taking        Dose / Directions   predniSONE 5 MG tablet  Commonly known as: DELTASONE  Used for: BOOP (bronchiolitis obliterans with organizing pneumonia) (H)      Take 40 mg daily for 2 weeks and reduce by 5 mg every 2 weeks until seen by your lung doctor  Quantity: 112 tablet  Refills: 0     sulfamethoxazole-trimethoprim 400-80 MG tablet  Commonly known as: BACTRIM  Used for: BOOP (bronchiolitis obliterans with organizing pneumonia) (H)      Dose: 1 tablet  Take 1 tablet by mouth daily  Quantity: 30 tablet  Refills: 0            CONTINUE these medicines which have NOT CHANGED        Dose / Directions   aspirin 81 MG tablet  Commonly known as: ASA  Used for: ST elevation myocardial infarction involving left anterior descending (LAD) coronary artery (H)      Dose: 81 mg  Take 1 tablet (81 mg) by mouth daily  Quantity: 30 tablet  Refills: 0     atorvastatin 80 MG tablet  Commonly known as: LIPITOR      Dose: 80 mg  Take 80 mg by mouth at bedtime  Refills: 0     Biotin 94696 MCG Tabs      Dose: 1 tablet  Take 1 tablet by mouth daily  Refills: 0     CALCIUM CARBONATE-VITAMIN D PO      Dose: 1 tablet  Take 1 tablet by mouth daily  Refills: 0     carvedilol 3.125 MG tablet  Commonly known as: COREG  Used for: ST elevation myocardial infarction involving left anterior descending (LAD) coronary artery (H)      Dose: 3.125 mg  Take 1 tablet (3.125 mg) by mouth 2 times daily  Quantity: 60 tablet  Refills: 11     losartan 25 MG tablet  Commonly known as: COZAAR  Used for: HTN (hypertension)      Dose: 25 mg  Take 1 tablet (25 mg) by mouth daily  Quantity: 90 tablet  Refills: 3     magnesium oxide 400 MG tablet  Commonly known as: MAG-OX      Dose: 400 mg  Take  400 mg by mouth at bedtime  Refills: 0     ondansetron 4 MG ODT tab  Commonly known as: ZOFRAN ODT      Dose: 4 mg  Take 4 mg by mouth every 6 hours as needed for nausea  Refills: 0     PROBIOTIC PO      Dose: 1 capsule  Take 1 capsule by mouth at bedtime  Refills: 0     vitamin C 1000 MG Tabs  Commonly known as: ASCORBIC ACID      Dose: 1,000 mg  Take 1,000 mg by mouth daily  Refills: 0            STOP taking      azaTHIOprine 50 MG tablet  Commonly known as: IMURAN                  Where to get your medicines        These medications were sent to [facility] [id number] [place] [facility] - [place], [place] - [address] [place] [address] [address] [place] [id number]      Phone: [phone]   predniSONE 5 MG tablet  sulfamethoxazole-trimethoprim 400-80 MG tablet          Discharge diet:Orders Placed This Encounter      Combination Diet Regular Diet Adult      Diet    regular diet      Discharge activity:Activity as tolerated      Discharge follow-up:    Follow up with primary care provider in 7 days or earlier if symptoms return or gets worse.    Follow up with consultant as instructed  with pulmonologist      Other instructions:    We discussed with patient/family about detail discharge instructions as well as discharge medications above including potential risks,side effects and benefits.Patient/family understood benefits and potential serious side effects of taking these medications and need to follow up with PCP if the patient develops complications.  Patient is also advised to see a doctor immediately for severe symptoms.        Major procedure performed/  Significant Diagnostic Studies:         Results for orders placed or performed during the hospital encounter of 07/26/24   CT Chest (PE) Abdomen Pelvis w Contrast    Narrative    CT CHEST PE ABDOMEN PELVIS W CONTRAST 7/26/2024 4:26 PM    CLINICAL HISTORY: Shortness of breath, cough, abdominal pain, fever  TECHNIQUE: CT angiogram chest and routine CT abdomen pelvis with  IV  contrast. Arterial phase through the chest and venous phase through  the abdomen and pelvis. 2D and 3D MIP reconstructions were performed  by the CT technologist. Dose reduction techniques were used.     CONTRAST: 90mL [id number]-370    COMPARISON: Chest CT 7/14/2024    FINDINGS:  ANGIOGRAM CHEST: Pulmonary arteries are normal caliber and negative  for pulmonary emboli. Thoracic aorta is negative for dissection. No CT  evidence of right heart strain.     LUNGS AND PLEURA: Peripheral reticular opacities throughout both lungs  are again noted with scattered groundglass attenuation, most  pronounced in the right lower lobe, grossly stable in comparison to  prior CT. No new consolidation, pleural effusion or pneumothorax.  Interval increase in size of right lower lobe pulmonary nodule,  currently measuring 0.8 cm (series 7 image 119), previously 0.5 cm,  although it measured up to 0.8 cm in June 2024 per report. Minimal  increase in size of left lower lobe pulmonary nodule, currently  measuring 0.5 cm, previously 0.4 cm (series 7 image 154).    MEDIASTINUM/AXILLAE: No suspicious lymphadenopathy. Small hiatal  hernia.    CORONARY ARTERY CALCIFICATION: Severe.    HEPATOBILIARY: Likely punctate hepatic cysts, no specific follow-up  recommended. No evidence of biliary obstruction.    PANCREAS: Normal.    SPLEEN: Normal.    ADRENAL GLANDS: Normal.    KIDNEYS/BLADDER: No hydronephrosis. Urinary bladder is unremarkable.    BOWEL: No obstruction or inflammatory change. Normal appendix.    LYMPH NODES: No suspicious lymphadenopathy.    PELVIC ORGANS: Normal.    OTHER: No abdominal pelvic free fluid. Moderate calcified  atherosclerosis.    MUSCULOSKELETAL: No acute bony abnormality.      Impression    IMPRESSION:  1.  Stable appearance of reticular changes and groundglass attenuation  throughout both lungs, favor sequela of prior infectious/inflammatory  process. No definite new airspace consolidation.  2.  Interval enlargement  "of pulmonary nodules since CT on 7/14/2024,  although these are reportedly similar in size to a CT in June 2024  which is not currently available for comparison. Recommend follow-up  CT in 3 months to document stability.  3.  No pulmonary embolus.  4.  No acute abnormality in the abdomen or pelvis.    AYUSH LAWRENCE MD         SYSTEM ID:  [id number]   US Lower Extremity Venous Duplex Bilateral    Narrative    EXAM: US LOWER EXTREMITY VENOUS DUPLEX BILATERAL  LOCATION: [facility]  DATE: 7/26/2024    INDICATION: Fever. rule out clot  COMPARISON: None.  TECHNIQUE: Venous Duplex ultrasound of bilateral lower extremities with and without compression, augmentation and duplex. Color flow and spectral Doppler with waveform analysis performed.    FINDINGS: Exam includes the common femoral, femoral, popliteal veins as well as segmentally visualized deep calf veins and greater saphenous vein.     RIGHT: No deep vein thrombosis. No superficial thrombophlebitis. No popliteal cyst.    LEFT: No deep vein thrombosis. No superficial thrombophlebitis. No popliteal cyst.      Impression    IMPRESSION:  1.  No deep venous thrombosis in the bilateral lower extremities.       Recent Labs   Lab 07/29/24  0607 07/28/24  0704 07/27/24  0546   WBC 7.0 5.5 11.8*   HGB 10.4* 10.0* 10.1*   HCT 32.5* 31.6* 31.3*   MCV 86 88 88    286 306     No results for input(s): \"CULT\" in the last 168 hours.  Recent Labs   Lab 07/29/24  0607 07/28/24  0704 07/27/24  0753 07/27/24  0546 07/26/24 2003 07/26/24  1521    140  --  137  --  136   POTASSIUM 3.8 4.1  --  3.9  --  4.6   CHLORIDE 112* 110*  --  106  --  100   CO2 20* 21*  --  19*  --  19*   ANIONGAP 12 9  --  12  --  17*   * 120*  --  140*  --  130*   BUN 9.1 11.0  --  15.2  --  20.1   CR 0.65 0.67  --  0.86   < > 0.76   GFRESTIMATED >90 >90  --  74   < > 85   ANTHONY 8.8 7.9*  --  7.9*  --  9.3   MAG 1.8 2.1 2.8* <0.2*   < >  --    PROTTOTAL  --  5.5*  --   --  " " --  7.4   ALBUMIN  --  2.9*  --   --   --  3.9   BILITOTAL  --  0.5  --   --   --  0.6   ALKPHOS  --  78  --   --   --  107   AST  --   --   --   --   --  30   ALT  --  49  --   --   --  51*    < > = values in this interval not displayed.       Recent Labs   Lab 07/29/24  0607 07/28/24  0704 07/27/24  0546 07/26/24  1521   * 120* 140* 130*       No results for input(s): \"INR\" in the last 168 hours.        Pending Results:       Unresulted Labs Ordered in the Past 30 Days of this Admission       Date and Time Order Name Status Description    7/26/2024  3:22 PM Blood Culture Arm, Left Preliminary                Patient Allergies:       Allergies   Allergen Reactions    Cellcept [Mycophenolate]     Levaquin [Levofloxacin]     Lisinopril Cough         Disposition:     Disposition: home        I saw and evaluated the patient on day of discharge and  discharge instructions reviewed  and  all the patient's questions and concerns addressed. Over 30 minutes spent on discharge and coordination of discharge process for this patient.      Disclaimer: This note consists of symbols derived from keyboarding, dictation and/or voice recognition software. As a result, there may be errors in the script that have gone undetected. Please consider this when interpreting information found in this chart      " (0) independent

## 2024-07-29 NOTE — CONSULTS
[facility]    Pulmonary, Allergy, Critical Care and Sleep Medicine    Initial Consultation  07/29/2024    Bernard Dodge MRN# [id number]   Age: 68 year old YOB: 1955     Date of Admission: 7/26/2024  Reason for Consultation: Recurrent fevers after stopping antibiotics and starting Imuran.  Requesting Team: Hospital Medicine     Assessment and Recommendations:    Bernard Dodge is a 68 year old female with a history of breast cancer, radiation pneumonitis s/p steroids 2014, biopsy proven organizing pneumonia (2015) with recurrence 2023 s/p multiple steroid tapers and recent start of Imuran, and recent hospitalization for fever and emesis who presented on 7/26/2024 with fever and nausea/emesis.     Recurrent Fever   Recurrent Organizing Pneumonia  In setting of recent start of Imuran as steroid sparing therapy for recurrent organizing pneumonia. Initial period of about two weeks on Imuran before first admission. Second admission this month for fever and GI symptoms. Notably with very high procalcitonin but otherwise no focal signs of infection and has been afebrile off antibiotics for 48+ hours. Extensive infectious workup during prior admission that was negative and chest imaging fairly stable from prior and abdominal/pelvic imaging without acute findings. Does have some pulmonary nodules that will need continued attention (do see a RLL nodule in roughly the same position on a 2016 CT) but discussed with patient that do not think they are related to current presentation. Patient was concerned about very high procal as read it can represent cancer and discussed it is a test that can be elevated for multiple reasons. Have found at least one report of procal elevation in azathioprine hypersensitivity.     Per patient needed prolonged and very slow prednisone taper in the past to recover from organizing pneumonia. Has been tried on steroid sparing therapy with Cellcept and now  Imuran and did not tolerate either. Today is hemodynamically stable and do not have any additional testing that would pursue inpatient. Believe stable to discharge and discussed with patient that for now will continue prednisone. Patient voices an objection to ever using Imuran again as is afraid of recurrent symptoms. Will discharge with initial weeks of prednisone for slow taper (decrease of about 5 mg every couple weeks). Discussed that will start PJP prophylaxis while potentially on long term steroids. Does have acid reflux (perhaps exacerbated by steroids) but does not feel that symptoms warrant daily therapy at this time.     - Ok to discharge from Pulmonary perspective  - Send home on current prednisone of 40 mg daily, plan at current dose for a couple weeks then drop to 35 mg for a couple weeks  - Discussed that will need to work with Dr. Verma on whether would try any alternative therapy vs continue prednisone and then determine pace of taper  - Would discharge on single strength Bactrim daily for PJP prophylaxis (patient reports this what used in past and no sulfa allergy)  - May warrant PPI therapy if worsening reflux  - Briefly discussed that if on long term steroids will need to think about her diabetes control, may need to loop in PCP  - Will try to reach out to patient's outpatient pulmonary team to give update     Lisseth Flood MD PhD  Pulmonary and Critical Care     For [facility], Pulmonary is in house or available by page from 7 am to 6 pm Monday-Friday. For assistance at other times page the on-call pulmonologist through [facility] or the .      History of Present Illness:    History taken from patient and chart review.     Radiation pneumonitis (s/p steroid taper completed 12/2014) and cryptogenic organizing pneumonia (biopsy 1/2015, off steroids 4/2016, recurrent 2023 on very slow pred taper Fall 2023 through 4/2024 and restarted in late June 2024 along with Imuran, no  toleration of Cellcept in past). Also SHRUTHI (AHI 18 12/2014), CAD s/p PCI LAC 8/2016.    7/15 admission with fevers and emesis, CT improved in RLL from prior. Infectious workup with ID and Pulm including bacterial, fungal, viral, tick without significant findings other than elevated procal, empiric treatment with Augmentin.   Discharged and seen by primary pulmonologist Dr. Saleh 7/22. Suspicion for viral illness, plan to stop prednisone and retry Imuran following completion of antibiotics. Presented 7/26 with cough, nausea, vomiting.     Today reports going home and slowly improving post prior discharge. Did not have any new exposures, new foods, sick contacts. On Friday took Imuran for first time since hospitalization and says by midday was feeling sick again. Does not want to take Imuran ever again. While in hospital has had some dry cough that a little more predominant than in the past. No shortness of breath. Tired and fatigue in arms a big part of symptoms when had organizing pneumonia in past. Denies chronic ongoing respiratory symptoms such as dyspnea, cough. Some intermittent reflux, a little more recently, that will wake at night and require Tums. Former smoker. Did go through a lot of stress in May when her mother was on Hospice.     Review of Systems:  Complete 12 point ROS negative unless mentioned in HPI    Histories, Prior to Admission Medications, Allergies:    Past Medical History:  Past Medical History:   Diagnosis Date    Benign essential hypertension     BOOP (bronchiolitis obliterans with organizing pneumonia) (H)     post radiation    Breast CA (H)     left side    CAD (coronary artery disease), native coronary artery     STEMI 8/2016  PCI mid LAD 8/22/16    DM (diabetes mellitus screen)     Dyslipidemia     Ex-smoker     Fatty liver disease, nonalcoholic     Fracture of left ankle     Ischemic cardiomyopathy     Sleep apnea     Cpap     Past Surgical History:  Past Surgical History:   Procedure  Laterality Date    CV CORONARY ANGIOGRAM N/A 2023    Procedure: Coronary Angiogram;  Surgeon: Ivan Goldstein MD;  Location:  [facility]    CV INSTANTANEOUS WAVE-FREE RATIO N/A 2023    Procedure: Instantaneous Wave-Free Ratio;  Surgeon: Ivan Goldstein MD;  Location:  [facility]    HEART CATH, ANGIOPLASTY      STENT, CORONARY, S660 15/18  2016    MABEL=LAD     Past Social History:  Social History     Socioeconomic History    Marital status:      Spouse name: Not on file    Number of children: Not on file    Years of education: Not on file    Highest education level: Not on file   Occupational History    Not on file   Tobacco Use    Smoking status: Former     Current packs/day: 0.00     Average packs/day: 1 pack/day for 25.0 years (25.0 ttl pk-yrs)     Types: Cigarettes     Start date:      Quit date:      Years since quittin.5    Smokeless tobacco: Never   Vaping Use    Vaping status: Never Used   Substance and Sexual Activity    Alcohol use: Yes     Comment: 2 glasses of wine before bedtime    Drug use: Not Currently    Sexual activity: Not on file   Other Topics Concern    Parent/sibling w/ CABG, MI or angioplasty before 65F 55M? Yes     Service Not Asked    Blood Transfusions Not Asked    Caffeine Concern Yes     Comment: 1-2  cup daily    Occupational Exposure Not Asked    Hobby Hazards Not Asked    Sleep Concern Not Asked    Stress Concern Not Asked    Weight Concern Not Asked    Special Diet Yes     Comment: low NA    Back Care Not Asked    Exercise Yes     Comment: heart rehab, walk    Bike Helmet Not Asked    Seat Belt Not Asked    Self-Exams Not Asked   Social History Narrative    Not on file     Social Determinants of Health     Financial Resource Strain: Not on file   Food Insecurity: Not on file   Transportation Needs: Not on file   Physical Activity: Not on file   Stress: Not on file   Social Connections: Not on file    Interpersonal Safety: Not on file   Housing Stability: Not on file     Family History:  Family History   Problem Relation Age of Onset    Hypertension Mother     Myocardial Infarction Mother     Heart Disease Father     Diabetes Brother     Heart Disease Brother      Medications:  Current Facility-Administered Medications   Medication Dose Route Frequency Provider Last Rate Last Admin    atorvastatin (LIPITOR) tablet 80 mg  80 mg Oral At Bedtime Luis Bailey DO   80 mg at 07/28/24 2020    [Held by provider] azaTHIOprine (IMURAN) tablet 50 mg  50 mg Oral Daily Franky Nazario MD        [Held by provider] Biotin TABS 1 tablet  1 tablet Oral Daily Franky Nazario MD        [Held by provider] calcium carbonate-vitamin D (OSCAL) 500-5 MG-MCG per tablet 1 tablet  1 tablet Oral Daily Franky Nazario MD        carvedilol (COREG) tablet 3.125 mg  3.125 mg Oral BID Franky Nazario MD   3.125 mg at 07/29/24 0857    CT scan flush  100 mL Intravenous Once Fanny Caldera DO        diclofenac (VOLTAREN) 1 % topical gel 2 g  2 g Topical 4x Daily Franky Nazario MD   2 g at 07/28/24 2123    enoxaparin ANTICOAGULANT (LOVENOX) injection 40 mg  40 mg Subcutaneous Q24H Luis Bailey DO   40 mg at 07/28/24 2021    losartan (COZAAR) tablet 25 mg  25 mg Oral Daily Franky Nazario MD   25 mg at 07/29/24 0857    predniSONE (DELTASONE) tablet 40 mg  40 mg Oral Daily Franky Nazario MD   40 mg at 07/29/24 0857    sodium chloride (PF) 0.9% PF flush 3 mL  3 mL Intracatheter Q8H Fanny Caldera DO   3 mL at 07/29/24 0227    sodium chloride (PF) 0.9% PF flush 3 mL  3 mL Intracatheter Q8H Luis Bailey DO   3 mL at 07/29/24 1208    [Held by provider] vitamin C (ASCORBIC ACID) tablet 1,000 mg  1,000 mg Oral Daily Franky Nazario MD         Current Facility-Administered Medications   Medication Dose Route Frequency Provider Last Rate Last Admin    acetaminophen (TYLENOL) tablet 650 mg  650 mg Oral  Q4H PRN Luis Bailey DO   650 mg at 07/27/24 2210    Or    acetaminophen (TYLENOL) Suppository 650 mg  650 mg Rectal Q4H PRN Luis Bailey DO        calcium carbonate (TUMS) chewable tablet 1,000 mg  1,000 mg Oral 4x Daily PRN Luis Bailey DO        lidocaine (LMX4) cream   Topical Q1H PRN Fanny Caldera DO        lidocaine (LMX4) cream   Topical Q1H PRN Luis Bailey DO        lidocaine 1 % 0.1-1 mL  0.1-1 mL Other Q1H PRN Fanny Caldera DO        lidocaine 1 % 0.1-1 mL  0.1-1 mL Other Q1H PRN Luis Bailey DO        melatonin tablet 10 mg  10 mg Oral At Bedtime PRN Joaquín Vasquez MD   10 mg at 07/29/24 0226    ondansetron (ZOFRAN ODT) ODT tab 4 mg  4 mg Oral Q6H PRN Luis Bailey DO        Or    ondansetron (ZOFRAN) injection 4 mg  4 mg Intravenous Q6H PRN Luis Bailey DO        prochlorperazine (COMPAZINE) injection 5 mg  5 mg Intravenous Q6H PRN Luis Bailey DO        Or    prochlorperazine (COMPAZINE) tablet 5 mg  5 mg Oral Q6H PRN Luis Bailey DO        Or    prochlorperazine (COMPAZINE) suppository 12.5 mg  12.5 mg Rectal Q12H PRN Luis Bailey DO        senna-docusate (SENOKOT-S/PERICOLACE) 8.6-50 MG per tablet 1 tablet  1 tablet Oral BID PRN Luis Bailey DO        Or    senna-docusate (SENOKOT-S/PERICOLACE) 8.6-50 MG per tablet 2 tablet  2 tablet Oral BID PRN Luis Bailey DO        sodium chloride (PF) 0.9% PF flush 3 mL  3 mL Intracatheter q1 min prn Fanny Caldera DO        sodium chloride (PF) 0.9% PF flush 3 mL  3 mL Intracatheter q1 min prn Luis Bailey DO   3 mL at 07/27/24 1632     Allergies:     Allergies   Allergen Reactions    Cellcept [Mycophenolate]     Levaquin [Levofloxacin]     Lisinopril Cough     Physical Exam:    Temp:  [97.3  F (36.3  C)-98.4  F (36.9  C)] 97.3  F (36.3  C)  Pulse:  [64-85] 64  Resp:  [16-18] 16  BP: (134-193)/(58-98) 134/58  SpO2:  [95 %-100 %] 97 %    Intake/Output Summary (Last 24 hours) at  7/29/2024 1319  Last data filed at 7/29/2024 1100  Gross per 24 hour   Intake 360 ml   Output --   Net 360 ml     General: laying in bed in NAD  HEENT: anicteric, moist mucosa  Chest: Good air movement, no wheezing, mild crackles  Cardiac: RRR no murmurs  Abdomen: Soft, flat, non tender, active BS  Extremities: No LE Edema  Neuro: A&Ox3, no focal deficits   Skin: no rash noted    Laboratory, imaging, and microbiologic data:    All laboratory and imaging data reviewed, pertinent results discussed above.

## 2024-07-29 NOTE — PLAN OF CARE
"Blood pressure elevated. Diminished LS. PIERCE. +1 edema to hands and feet. Complained of upper back pain and pain med's given with relief.     Goal Outcome Evaluation:      Plan of Care Reviewed With: patient    Overall Patient Progress: improvingOverall Patient Progress: improving    Outcome Evaluation: VS stable. Slight back pain.      Problem: Adult Inpatient Plan of Care  Goal: Plan of Care Review  Description: The Plan of Care Review/Shift note should be completed every shift.  The Outcome Evaluation is a brief statement about your assessment that the patient is improving, declining, or no change.  This information will be displayed automatically on your shift  note.  Outcome: Progressing  Flowsheets (Taken 7/28/2024 4908)  Outcome Evaluation: VS stable. Slight back pain.  Plan of Care Reviewed With: patient  Overall Patient Progress: improving  Goal: Patient-Specific Goal (Individualized)  Description: You can add care plan individualizations to a care plan. Examples of Individualization might be:  \"Parent requests to be called daily at 9am for status\", \"I have a hard time hearing out of my right ear\", or \"Do not touch me to wake me up as it startles  me\".  Outcome: Progressing  Goal: Absence of Hospital-Acquired Illness or Injury  Outcome: Progressing  Intervention: Identify and Manage Fall Risk  Recent Flowsheet Documentation  Taken 7/28/2024 2015 by Nori Li, RN  Safety Promotion/Fall Prevention:   clutter free environment maintained   lighting adjusted   nonskid shoes/slippers when out of bed  Intervention: Prevent Skin Injury  Recent Flowsheet Documentation  Taken 7/28/2024 2015 by Nori Li, RN  Body Position: position changed independently  Device Skin Pressure Protection: absorbent pad utilized/changed  Intervention: Prevent and Manage VTE (Venous Thromboembolism) Risk  Recent Flowsheet Documentation  Taken 7/28/2024 2015 by Nori Li, RN  VTE Prevention/Management: SCDs off " (sequential compression devices)  Intervention: Prevent Infection  Recent Flowsheet Documentation  Taken 7/28/2024 2015 by Nori Li, RN  Infection Prevention: single patient room provided  Goal: Optimal Comfort and Wellbeing  Outcome: Progressing  Goal: Readiness for Transition of Care  Outcome: Progressing     Problem: Infection  Goal: Absence of Infection Signs and Symptoms  Outcome: Progressing     Problem: Sepsis/Septic Shock  Goal: Optimal Coping  Outcome: Progressing  Goal: Absence of Bleeding  Outcome: Progressing  Goal: Blood Glucose Level Within Targeted Range  Outcome: Progressing  Goal: Absence of Infection Signs and Symptoms  Outcome: Progressing  Intervention: Initiate Sepsis Management  Recent Flowsheet Documentation  Taken 7/28/2024 2015 by Nori Li, RN  Infection Prevention: single patient room provided  Intervention: Promote Recovery  Recent Flowsheet Documentation  Taken 7/28/2024 2015 by Nori Li, RN  Activity Management: activity adjusted per tolerance  Goal: Optimal Nutrition Intake  Outcome: Progressing     Problem: Skin Injury Risk Increased  Goal: Skin Health and Integrity  Outcome: Progressing  Intervention: Plan: Nurse Driven Intervention: Moisture Management  Recent Flowsheet Documentation  Taken 7/28/2024 2015 by Nori Li RN  Moisture Interventions: Encourage regular toileting  Intervention: Optimize Skin Protection  Recent Flowsheet Documentation  Taken 7/28/2024 2015 by Nori Li, RN  Activity Management: activity adjusted per tolerance

## 2024-07-29 NOTE — PROGRESS NOTES
[facility]  Hospitalist Progress Note  Franky Nazario M.D., M.B.A.   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     Problem List with Assessment and Plan:    Unexplained fever and sepsis:  Patient had a recent hospitalization for same just earlier this month.  She has a known medical history of BOOP for which she has been taking Imuran and prednisone.  She had a broad workup during her last hospitalization which showed negative UA, negative blood cultures, negative respiratory viral panel, negative beta D glucan, negative Histoplasma, negative blastomyces, negative coccidioides, negative tickborne disease.  No infection was found.  She was treated for a suspected pneumonia.  She was discharged with 5 day course of augmentin.  She stopped this 2 days prior to arrival.  Of note, she resumed her imuran morning of presentation and had fever and vomiting within 3 or so hours. We  question if this is truly a return of infection or if we should consider her imuran as a cause of fever & vomiting.  There are case reports of imuran causing fever.  Should also consider malignancy and clot.  No PE on CT scan.    -ID and pulmonary consultation requested   - she was continued with zosyn & azithro   -Held PTA imuran - should consider this a possible cause of recurrent fevers  -she was seen by ID and Dr Mccullough felt there is convincing evidence of infectious process despite elevated procal. It is possible that imuran can cause fever    --she was started on prednisone on  at 40 mg po daily  --currently afebrile , she is doing well with no complaints.will continue prednisone , await Pulm medicine input       Nausea and vomitin-3 hours after restarting imuran.  Associated with fever.  Denies diarrhea or abdominal discomfort.  Possible that she caught some viral infection/gastroenteritis.   -Supportive cares with IVF & meds     AGMA  Lactic acidosis:  Anion gap metabolic acidosis likely related to lactic acidosis in the setting of sepsis.  Lactic acid 3.3 on arrival.  Improved to 2.5 after sepsis fluid bolus.  Likely contribution from recurrent fever.  Patient is also mildly tachycardic and with softer blood pressure.  -Received sepsis fluid in the emergency department  -Continue antibiotics as above.  --IVF stopped due to edema      CAD  HTN  HLD: History LAD STEMI in 2016 requiring PCI.  Had an ischemic cardiomyopathy then that has resolved.  Not chronically on diuretics.  Most recent EF 50-55% last year.  She is on losartan 25 mg daily, carvedilol 3.125 mg twice daily, atorvastatin 80 mg daily, and aspirin 81 mg daily.  -continue  coreg and losartan  with parameters   -Continue ASA & statin     Diet controlled type II DM: A1c has been in the 6 range.  Sliding scale insulin    History of breast cancer: S/p lumpectomy and radiation.      VTE Prophylaxis: Enoxaparin (Lovenox) SQ  Code Status: Full Code  Diet: Combination Diet Regular Diet Adult    Renteria Catheter: Not present        Family updated today:  Yes  ,update discussed with daughter at bed side     Anticipated Disposition : home        Medically Ready for Discharge: Anticipated Tomorrow    : pending progress and inputs from ID and pulmonary team           Interval History (Subjective):        Patient is seen and examined by me today and medical record reviewed.Overnight events noted and care discussed with nursing staff.She feels better , no fever or chills   Care plan discussed  "with bedside nurse                   Physical Exam:        Last Vital Signs:  BP (!) 157/71 (BP Location: Left arm)   Pulse 68   Temp 97.8  F (36.6  C) (Oral)   Resp 18   Ht 1.575 m (5' 2\")   Wt 81.8 kg (180 lb 6.4 oz)   LMP  (LMP Unknown)   SpO2 96%   BMI 33.00 kg/m      No intake/output data recorded.    Wt Readings from Last 5 Encounters:   07/28/24 81.8 kg (180 lb 6.4 oz)   07/14/24 81.4 kg (179 lb 7.3 oz)   12/07/23 78.9 kg (174 lb)   11/20/23 76.8 kg (169 lb 6.4 oz)   11/06/23 78 kg (172 lb)        Constitutional: Awake, alert, cooperative, no apparent distress     Respiratory: Clear to auscultation bilaterally, no crackles or wheezing   Cardiovascular: Regular rate and rhythm, normal S1 and S2, and no murmur noted   Abdomen: Normal bowel sounds, soft, non-distended, non-tender   Skin: No new rashes, no cyanosis, dry to touch   Neuro: Alert with  no new focal weakness   Extremities: No edema   Other(s):        All other systems: Negative          Medications:        All current medications were reviewed with changes reflected in problem list.         Data:      All new lab and imaging data was reviewed.      Data reviewed today: I reviewed all new labs and imaging results over the last 24 hours. I personally reviewed     Recent Labs   Lab 07/29/24  0607 07/28/24 0704 07/27/24 0546   WBC 7.0 5.5 11.8*   HGB 10.4* 10.0* 10.1*   HCT 32.5* 31.6* 31.3*   MCV 86 88 88    286 306     No results for input(s): \"CULT\" in the last 168 hours.  Recent Labs   Lab 07/29/24  0607 07/28/24  0704 07/27/24  0753 07/27/24  0546 07/26/24 2003 07/26/24  1521    140  --  137  --  136   POTASSIUM 3.8 4.1  --  3.9  --  4.6   CHLORIDE 112* 110*  --  106  --  100   CO2 20* 21*  --  19*  --  19*   ANIONGAP 12 9  --  12  --  17*   * 120*  --  140*  --  130*   BUN 9.1 11.0  --  15.2  --  20.1   CR 0.65 0.67  --  0.86   < > 0.76   GFRESTIMATED >90 >90  --  74   < > 85   ANTHONY 8.8 7.9*  --  7.9*  --  9.3   MAG " "1.8 2.1 2.8* <0.2*   < >  --    PROTTOTAL  --  5.5*  --   --   --  7.4   ALBUMIN  --  2.9*  --   --   --  3.9   BILITOTAL  --  0.5  --   --   --  0.6   ALKPHOS  --  78  --   --   --  107   AST  --   --   --   --   --  30   ALT  --  49  --   --   --  51*    < > = values in this interval not displayed.       Recent Labs   Lab 07/29/24  0607 07/28/24  0704 07/27/24  0546 07/26/24  1521   * 120* 140* 130*       No results for input(s): \"INR\" in the last 168 hours.      No results for input(s): \"TROPONIN\", \"TROPI\", \"TROPR\" in the last 168 hours.    Invalid input(s): \"TROP\", \"TROPONINIES\"    Recent Results (from the past 48 hour(s))   CT Chest (PE) Abdomen Pelvis w Contrast    Narrative    CT CHEST PE ABDOMEN PELVIS W CONTRAST 7/26/2024 4:26 PM    CLINICAL HISTORY: Shortness of breath, cough, abdominal pain, fever  TECHNIQUE: CT angiogram chest and routine CT abdomen pelvis with IV  contrast. Arterial phase through the chest and venous phase through  the abdomen and pelvis. 2D and 3D MIP reconstructions were performed  by the CT technologist. Dose reduction techniques were used.     CONTRAST: 90mL [id number]-370    COMPARISON: Chest CT 7/14/2024    FINDINGS:  ANGIOGRAM CHEST: Pulmonary arteries are normal caliber and negative  for pulmonary emboli. Thoracic aorta is negative for dissection. No CT  evidence of right heart strain.     LUNGS AND PLEURA: Peripheral reticular opacities throughout both lungs  are again noted with scattered groundglass attenuation, most  pronounced in the right lower lobe, grossly stable in comparison to  prior CT. No new consolidation, pleural effusion or pneumothorax.  Interval increase in size of right lower lobe pulmonary nodule,  currently measuring 0.8 cm (series 7 image 119), previously 0.5 cm,  although it measured up to 0.8 cm in June 2024 per report. Minimal  increase in size of left lower lobe pulmonary nodule, currently  measuring 0.5 cm, previously 0.4 cm (series 7 image " 154).    MEDIASTINUM/AXILLAE: No suspicious lymphadenopathy. Small hiatal  hernia.    CORONARY ARTERY CALCIFICATION: Severe.    HEPATOBILIARY: Likely punctate hepatic cysts, no specific follow-up  recommended. No evidence of biliary obstruction.    PANCREAS: Normal.    SPLEEN: Normal.    ADRENAL GLANDS: Normal.    KIDNEYS/BLADDER: No hydronephrosis. Urinary bladder is unremarkable.    BOWEL: No obstruction or inflammatory change. Normal appendix.    LYMPH NODES: No suspicious lymphadenopathy.    PELVIC ORGANS: Normal.    OTHER: No abdominal pelvic free fluid. Moderate calcified  atherosclerosis.    MUSCULOSKELETAL: No acute bony abnormality.      Impression    IMPRESSION:  1.  Stable appearance of reticular changes and groundglass attenuation  throughout both lungs, favor sequela of prior infectious/inflammatory  process. No definite new airspace consolidation.  2.  Interval enlargement of pulmonary nodules since CT on 7/14/2024,  although these are reportedly similar in size to a CT in June 2024  which is not currently available for comparison. Recommend follow-up  CT in 3 months to document stability.  3.  No pulmonary embolus.  4.  No acute abnormality in the abdomen or pelvis.    AYUSH LAWRENCE MD         SYSTEM ID:  [id number]   US Lower Extremity Venous Duplex Bilateral    Narrative    EXAM: US LOWER EXTREMITY VENOUS DUPLEX BILATERAL  LOCATION: [facility]  DATE: 7/26/2024    INDICATION: Fever. rule out clot  COMPARISON: None.  TECHNIQUE: Venous Duplex ultrasound of bilateral lower extremities with and without compression, augmentation and duplex. Color flow and spectral Doppler with waveform analysis performed.    FINDINGS: Exam includes the common femoral, femoral, popliteal veins as well as segmentally visualized deep calf veins and greater saphenous vein.     RIGHT: No deep vein thrombosis. No superficial thrombophlebitis. No popliteal cyst.    LEFT: No deep vein thrombosis. No  superficial thrombophlebitis. No popliteal cyst.      Impression    IMPRESSION:  1.  No deep venous thrombosis in the bilateral lower extremities.       COVID Status:  COVID-19 PCR Results          7/26/2024    15:32   COVID-19 PCR Results   SARS CoV2 PCR Negative      COVID-19 Antibody Results, Testing for Immunity           No data to display                 Disclaimer: This note consists of symbols derived from keyboarding, dictation and/or voice recognition software. As a result, there may be errors in the script that have gone undetected. Please consider this when interpreting information found in this chart.

## 2024-07-29 NOTE — PROGRESS NOTES
[facility]  Infectious Disease Progress Note          Assessment and Plan:   Date of Admission:  7/26/2024  Date of Consult : 07/27/24     Assessment:  68YF with BOOP, on immunosuppressive therapy with Imuran/prednisone, hospitalized earlier this month for febrile illness without localizing symptoms and had an extensive negative work up for infection , treated with Augmentin for possible pneumonia, who has been admitted with recurrent spiking fevers, elevated procalcitonin and lactate with ongoing concern for infection     -Recurrent fever  -Immunocompromise due to Azathioprine/prednisone use.  -Recent hospitalization for febrile illness with negative work up for infection  -Chronic medical conditions - HTN, hyperlipidemia, CAD, hx of breast cancer, diet controlled diabetes     Recommendations:  No obvious focus of infection, CT appears improved certainly not suggestive of any acute infection, still some concern about the underlying cause, diagnosed as Boop but no actual biopsy on this occasion thus by extension some consideration of an FUO causing illness that is in the lungs possibly some other inflammatory disease besides Boop  Timing and course both now and previously suggest Imuran drug fever as a possible cause, obviously stop for now  Discontinue antibiotics despite procalcitonin no obvious bacterial infection  Consider increasing dose of steroids  Pending serum crypto antigen and CMV PCR  All other work up for infection has remained negative  Possibly okay to even discharge, difficult part will be ordered to go with the lung disease from a steroid standpoint without Imuran available, will leave the [facility] pulmonology        Interval History:     no new complaints and doing well; no cp, sob, n/v/d, or abd pain.  Feels well, temp is down, no new positive micro, imaging not impressive, procalcitonin elevated but little or nothing to suggest acute new bacterial infection              " Medications:     Current Facility-Administered Medications   Medication Dose Route Frequency Provider Last Rate Last Admin    atorvastatin (LIPITOR) tablet 80 mg  80 mg Oral At Bedtime Luis Bailey DO   80 mg at 07/28/24 2020    [Held by provider] azaTHIOprine (IMURAN) tablet 50 mg  50 mg Oral Daily Franky Nazario MD        [Held by provider] Biotin TABS 1 tablet  1 tablet Oral Daily Franky Nazario MD        [Held by provider] calcium carbonate-vitamin D (OSCAL) 500-5 MG-MCG per tablet 1 tablet  1 tablet Oral Daily Franky Nazario MD        carvedilol (COREG) tablet 3.125 mg  3.125 mg Oral BID Franky Nazario MD   3.125 mg at 07/29/24 0857    CT scan flush  100 mL Intravenous Once Fanny Caldera DO        diclofenac (VOLTAREN) 1 % topical gel 2 g  2 g Topical 4x Daily Franky Nazario MD   2 g at 07/28/24 2123    enoxaparin ANTICOAGULANT (LOVENOX) injection 40 mg  40 mg Subcutaneous Q24H Luis Bailey DO   40 mg at 07/28/24 2021    losartan (COZAAR) tablet 25 mg  25 mg Oral Daily Franky Nazario MD   25 mg at 07/29/24 0857    predniSONE (DELTASONE) tablet 40 mg  40 mg Oral Daily Franky Nazario MD   40 mg at 07/29/24 0857    sodium chloride (PF) 0.9% PF flush 3 mL  3 mL Intracatheter Q8H Fanny Caldera DO   3 mL at 07/29/24 0227    sodium chloride (PF) 0.9% PF flush 3 mL  3 mL Intracatheter Q8H Luis Bailey DO   3 mL at 07/29/24 1208    [Held by provider] vitamin C (ASCORBIC ACID) tablet 1,000 mg  1,000 mg Oral Daily Franky Nazario MD                      Physical Exam:   Blood pressure 134/58, pulse 64, temperature 97.3  F (36.3  C), temperature source Oral, resp. rate 16, height 1.575 m (5' 2\"), weight 81.8 kg (180 lb 6.4 oz), SpO2 97%, not currently breastfeeding.  Wt Readings from Last 2 Encounters:   07/28/24 81.8 kg (180 lb 6.4 oz)   07/14/24 81.4 kg (179 lb 7.3 oz)     Vital Signs with Ranges  Temp:  [97.3  F (36.3  C)-98.4  F (36.9  C)] 97.3  F (36.3 " " C)  Pulse:  [64-85] 64  Resp:  [16-18] 16  BP: (134-193)/(58-98) 134/58  SpO2:  [95 %-100 %] 97 %    Constitutional: Awake, alert, cooperative, no apparent distress     Lungs: Clear to auscultation bilaterally, no crackles or wheezing   Cardiovascular: Regular rate and rhythm, normal S1 and S2, and no murmur noted   Abdomen: Normal bowel sounds, soft, non-distended, non-tender   Skin: No rashes, no cyanosis, no edema   Other:           Data:   All microbiology laboratory data reviewed.  Recent Labs   Lab Test 07/29/24  0607 07/28/24  0704 07/27/24  0546   WBC 7.0 5.5 11.8*   HGB 10.4* 10.0* 10.1*   HCT 32.5* 31.6* 31.3*   MCV 86 88 88    286 306     Recent Labs   Lab Test 07/29/24  0607 07/28/24  0704 07/27/24  0546   CR 0.65 0.67 0.86     Recent Labs   Lab Test 07/14/24  2236   SED 26     No lab results found.    Invalid input(s): \"UC\"     "

## 2024-07-30 ENCOUNTER — PATIENT OUTREACH (OUTPATIENT)
Dept: CARE COORDINATION | Facility: CLINIC | Age: 69
End: 2024-07-30
Payer: MEDICARE

## 2024-07-30 NOTE — PROGRESS NOTES
"Clinic Care Coordination Contact  Transitions of Care Outreach  Chief Complaint   Patient presents with    Clinic Care Coordination - Post Hospital       Most Recent Admission Date: 7/26/2024   Most Recent Admission Diagnosis: Sepsis without acute organ dysfunction, due to unspecified organism (H) - A41.9     Most Recent Discharge Date: 7/29/2024   Most Recent Discharge Diagnosis: Sepsis without acute organ dysfunction, due to unspecified organism (H) - A41.9  BOOP (bronchiolitis obliterans with organizing pneumonia) (H) - J84.89     Transitions of Care Assessment    Discharge Assessment  How are you doing now that you are home?: \"Good. I have evreything I need, I had a restful night's sleep and feel better today. My blood pressure was a little high last night and this morning but monitoring that.\"  How are your symptoms? (Red Flag symptoms escalate to triage hotline per guidelines): Improved  Do you know how to contact your clinic care team if you have future questions or changes to your health status? : Yes  Does the patient have their discharge instructions? : Yes  Does the patient have questions regarding their discharge instructions? : No  Were you started on any new medications or were there changes to any of your previous medications? : Yes  Does the patient have all of their medications?: Yes  Do you have questions regarding any of your medications? : No  Do you have all of your needed medical supplies or equipment (DME)?  (i.e. oxygen tank, CPAP, cane, etc.): Yes    Post-op ([facility] CTA Only)  If the patient had a surgery or procedure, do they have any questions for a nurse?: No    Follow up Plan     Discharge Follow-Up  Discharge follow up appointment scheduled in alignment with recommended follow up timeframe or Transitions of Risk Category? (Low = within 30 days; Moderate= within 14 days; High= within 7 days): Yes  Discharge Follow Up Appointment Date: 08/01/24  Discharge Follow Up Appointment Scheduled " with?: Specialty Care Provider (Pulmonology appt.)    Future Appointments   Date Time Provider Department Center   10/9/2024  7:30 AM [facility] LAB [facility]   10/9/2024  8:30 AM [id number]   1/3/2025  8:15 AM Tim Heredia MD [facility]       Outpatient Plan as outlined on AVS reviewed with patient.    For any urgent concerns, please contact our 24 hour nurse triage line: [phone])       CASSY Reid  [phone]  Connected Care Resource [facility]

## 2024-07-31 LAB — BACTERIA BLD CULT: NO GROWTH

## 2024-08-19 NOTE — DISCHARGE SUMMARY
"[facility]  Hospitalist Discharge Summary      Date of Admission:  7/14/2024  Date of Discharge:  7/19/2024  3:55 PM  Discharging Provider: Kevin Elam MD  Discharge Service: Hospitalist Service    Discharge Diagnoses   Sepsis  Acute Hypoxic Respiratory Failure, resolved  Possible Right Lower Lobe Pneumonia  Hx Cryptogenic Organizing Pneumonia  Hx Radiation Pneumonitis  CAD w/ MABEL to LAD (2016)  Hx Ischemic Cardiomyopathy  Hypertension  Hyperlipidemia  Microscopic Hematuria   SHRUTHI   Hx Breast Cancer   Constipation     Clinically Significant Risk Factors     # Obesity: Estimated body mass index is 32.82 kg/m  as calculated from the following:    Height as of 12/7/23: 1.575 m (5' 2\").    Weight as of this encounter: 81.4 kg (179 lb 7.3 oz).       Follow-ups Needed After Discharge   Follow-up Appointments     Follow-up and recommended labs and tests       Follow up with primary care provider, Kumar Webber, within 7 days for   hospital follow- up.      Follow up with primary pulmonologist within 1-2 weeks to discuss   immunosuppressive medications for .          Discharge Disposition   Discharged to home  Condition at discharge: Stable    Hospital Course   Bernard Dodge is a 68 year old female with past medical history significant for cryptogenic organizing pneumonia, radiation pneumonitis, CAD s/p STEMI s/p MABEL to LAD (2016), hypertension, hyperlipidemia, type 2 diabetes mellitus, breast cancer and SHRUTHI who presented to [facility] with 1 week history of fevers, nausea and vomiting and was found to have sepsis due to possible right lower lobe pneumonia vs atypical infection due to immunosuppression.     Complicated pulmonary history with radiation pneumonitis in 2014 and COPD since 2016.  She has been off and on steroids since that time, and recently started long prednisone taper with azathioprine.  Primary pulmonologist is Dr. Evans at [facility].  Presented on 7/15/2024 with 3-day " history of fevers and vomiting, also with shortness of breath and cough.  Initially tachycardic to 100 with leukocytosis of 13.8.  CT chest with peripheral reticular opacities in both lungs consistent with lung fibrosis, also with moderate clearing of GGO and consolidative opacity in right lower lobe possibly related to postinflammatory response versus pneumonia.  Urinalysis without nitrates or leukocyte estrace, though did have microscopic hematuria.  Urine culture with mixed horacio.  Blood cultures NGTD.  Viral respiratory panel, including COVID/influenza negative.  Beta D glucan negative; rechecking. Histoplasma negative; blastomycosis pending; also adding coccidioidosis testing as patient travelled to [place] in June.  Legionella negative.  Tickborne disease panel negative. White blood cell count has decreased.  Procalcitonin obtained is 2.52. Continuing on Zosyn + azithromycin for unclear source of sepsis.  Despite no localizing source of infection, patient clinically improving; able to wean from supplemental O2, overall feeling slightly improved, and fever spikes becoming more infrequent and not as high.  Night sweats resolved night prior to discharge. Prescribed augmentin on discharge per ID recommendations. Will follow up with PCP and pulmonologist.    Consultations This Hospital Stay   INFECTIOUS DISEASES IP CONSULT  PULMONARY IP CONSULT    Code Status   Prior    Time Spent on this Encounter   I, Kevin Elam MD, personally saw the patient today and spent greater than 30 minutes discharging this patient.       Kevin Elam MD  [place] [address] [facility]  [address] [place] [address]  Phone: [phone]  Fax: [phone]  ______________________________________________________________________    Physical Exam   Vital Signs:                    Weight: 179 lbs 7.27 oz    General: Very pleasant female resting comfortably in hospital bed.  Awake, alert, interactive. RN at bedside.  Daughter at bedside.  HEENT: Normocephalic, atraumatic.  PERRL, EOMI.  Conjunctiva clear, sclerae anicteric.  Mucous membranes moist.  Respiratory: Normal work of breathing ORA.   Musculoskeletal: Moving all extremities appropriately.  Skin: No rashes or abrasions on exposed skin.  Neurologic: Alert and oriented x4.  Cranial nerves II through XII grossly intact.  Psychologic: Appropriate mood and affect.        Primary Care Physician   Kumar Webber    Discharge Orders      Reason for your hospital stay    You were admitted to the hospital for infection of unknown source.     Follow-up and recommended labs and tests     Follow up with primary care provider, Kumar Webber, within 7 days for hospital follow- up.      Follow up with primary pulmonologist within 1-2 weeks to discuss immunosuppressive medications for .     Activity    Your activity upon discharge: activity as tolerated     Diet    Follow this diet upon discharge: Orders Placed This Encounter      Combination Diet Regular Diet Adult       Significant Results and Procedures   Most Recent 3 CBC's:  Recent Labs   Lab Test 07/29/24  0607 07/28/24  0704 07/27/24  0546   WBC 7.0 5.5 11.8*   HGB 10.4* 10.0* 10.1*   MCV 86 88 88    286 306     Most Recent 3 BMP's:  Recent Labs   Lab Test 07/29/24  0607 07/28/24  0704 07/27/24  0546    140 137   POTASSIUM 3.8 4.1 3.9   CHLORIDE 112* 110* 106   CO2 20* 21* 19*   BUN 9.1 11.0 15.2   CR 0.65 0.67 0.86   ANIONGAP 12 9 12   ANTHONY 8.8 7.9* 7.9*   * 120* 140*     Most Recent 2 LFT's:  Recent Labs   Lab Test 07/28/24  0704 07/26/24  1521 07/14/24  2236   AST  --  30 40   ALT 49 51* 35   ALKPHOS 78 107 63   BILITOTAL 0.5 0.6 1.0   ,   Results for orders placed or performed during the hospital encounter of 07/14/24   Chest CT w/o contrast    Narrative    EXAM: CT CHEST W/O CONTRAST  LOCATION: [facility]  DATE: 7/14/2024    INDICATION: fever despite antibiotics, h o BOOP, recent  abnormal CXR  COMPARISON: 6/11/2024  TECHNIQUE: CT chest without IV contrast. Multiplanar reformats were obtained. Dose reduction techniques were used.  CONTRAST: None.    FINDINGS:   LUNGS AND PLEURA:     Again noted, peripheral reticular opacities in both lungs consistent with lung fibrosis and/or scarring. The central airways are patent. Moderate clearing of groundglass and consolidative airspace opacity in the right lower lobe. Findings could represent   remaining postinflammatory response however residual pneumonia not excluded.    Decreasing size of 0.8 cm nodule in the periphery of the right lower lobe now measuring approximately 5.3 mm ( series 5 image 145 ). Previously noted 0.5 cm nodule in the periphery of the left lower lobe appears decreased in size measuring 3.5 mm (series   4 image 165). No new pulmonary nodules.    MEDIASTINUM/AXILLAE: No lymphadenopathy. No thoracic aortic aneurysm. Small hiatal hernia. Tiny anterior pericardial effusion.    CORONARY ARTERY CALCIFICATION: Moderate to severe    UPPER ABDOMEN: No significant finding.    MUSCULOSKELETAL: Mild to moderate thoracic spondylosis.      Impression    IMPRESSION:   1.  Moderate clearing of groundglass and consolidative airspace opacity in the right lower lobe. Findings could represent remaining postinflammatory response however residual pneumonia not excluded.  2.  Decreasing size of bilateral lower lobe pulmonary nodules.  3.  Peripheral reticular opacities in both lungs consistent with lung fibrosis and/or scarring.         Discharge Medications   Discharge Medication List as of 7/19/2024  1:26 PM        START taking these medications    Details   amoxicillin-clavulanate (AUGMENTIN) 875-125 MG tablet Take 1 tablet by mouth every 12 hours for 11 doses, Disp-11 tablet, R-0, E-Prescribe           CONTINUE these medications which have NOT CHANGED    Details   aspirin 81 MG tablet Take 1 tablet (81 mg) by mouth daily, Disp-30 tablet, OTC       Biotin 36730 MCG TABS Take 1 tablet by mouth daily, Historical      carvedilol (COREG) 3.125 MG tablet Take 1 tablet (3.125 mg) by mouth 2 times daily, Disp-60 tablet, R-11, E-Prescribe      losartan (COZAAR) 25 MG tablet Take 1 tablet (25 mg) by mouth daily, Disp-90 tablet, R-3, E-Prescribe      magnesium oxide (MAGNESIUM OXIDE) 400 MG tablet Take 400 mg by mouth at bedtime, Historical      ondansetron (ZOFRAN ODT) 4 MG ODT tab Take 4 mg by mouth every 6 hours as needed for nausea, Historical      Probiotic Product (PROBIOTIC PO) Take 1 capsule by mouth at bedtime, Historical      vitamin C (ASCORBIC ACID) 1000 MG TABS Take 1,000 mg by mouth daily, Historical      atorvastatin (LIPITOR) 80 MG tablet Take 1 tablet (80 mg) by mouth daily, Disp-30 tablet, R-11, E-Prescribe      Calcium Carbonate-Vit D-Min (CALCIUM 1200) 4203-7299 MG-UNIT CHEW Take 1 tablet by mouth daily, Historical      nitroGLYcerin (NITROSTAT) 0.4 MG sublingual tablet Place 1 tablet (0.4 mg) under the tongue every 5 minutes as needed for chest pain if you are still having symptoms after 3 doses (15 minutes) call 911., Disp-25 tablet, R-2, E-Prescribe           STOP taking these medications       azaTHIOprine (IMURAN) 50 MG tablet Comments:   Reason for Stopping:         azithromycin (ZITHROMAX) 250 MG tablet Comments:   Reason for Stopping:         predniSONE (DELTASONE) 10 MG tablet Comments:   Reason for Stopping:         predniSONE (DELTASONE) 20 MG tablet Comments:   Reason for Stopping:             Allergies   Allergies   Allergen Reactions    Cellcept [Mycophenolate]     Levaquin [Levofloxacin]     Lisinopril Cough

## 2024-10-09 ENCOUNTER — HOSPITAL ENCOUNTER (OUTPATIENT)
Dept: CARDIOLOGY | Facility: CLINIC | Age: 69
Discharge: HOME | End: 2024-10-09
Attending: INTERNAL MEDICINE | Admitting: INTERNAL MEDICINE
Payer: MEDICARE

## 2024-10-09 DIAGNOSIS — I25.5 ISCHEMIC CARDIOMYOPATHY: ICD-10-CM

## 2024-10-09 LAB — BI-PLANE LVEF ECHO: NORMAL

## 2024-10-09 PROCEDURE — 93306 TTE W/DOPPLER COMPLETE: CPT | Mod: 26 | Performed by: INTERNAL MEDICINE

## 2024-10-09 PROCEDURE — 93306 TTE W/DOPPLER COMPLETE: CPT

## 2024-10-10 ENCOUNTER — LAB (OUTPATIENT)
Dept: LAB | Facility: CLINIC | Age: 69
End: 2024-10-10
Payer: MEDICARE

## 2024-10-10 DIAGNOSIS — I10 ESSENTIAL HYPERTENSION: ICD-10-CM

## 2024-10-10 DIAGNOSIS — I25.10 CORONARY ARTERY DISEASE INVOLVING NATIVE CORONARY ARTERY OF NATIVE HEART WITHOUT ANGINA PECTORIS: ICD-10-CM

## 2024-10-10 LAB
ALT SERPL W P-5'-P-CCNC: 27 U/L (ref 0–50)
ANION GAP SERPL CALCULATED.3IONS-SCNC: 13 MMOL/L (ref 7–15)
BUN SERPL-MCNC: 17.1 MG/DL (ref 8–23)
CALCIUM SERPL-MCNC: 9.7 MG/DL (ref 8.8–10.4)
CHLORIDE SERPL-SCNC: 103 MMOL/L (ref 98–107)
CHOLEST SERPL-MCNC: 154 MG/DL
CREAT SERPL-MCNC: 0.86 MG/DL (ref 0.51–0.95)
EGFRCR SERPLBLD CKD-EPI 2021: 73 ML/MIN/1.73M2
FASTING STATUS PATIENT QL REPORTED: YES
GLUCOSE SERPL-MCNC: 111 MG/DL (ref 70–99)
HCO3 SERPL-SCNC: 25 MMOL/L (ref 22–29)
HDLC SERPL-MCNC: 83 MG/DL
LDLC SERPL CALC-MCNC: 51 MG/DL
NONHDLC SERPL-MCNC: 71 MG/DL
POTASSIUM SERPL-SCNC: 4.1 MMOL/L (ref 3.4–5.3)
SODIUM SERPL-SCNC: 141 MMOL/L (ref 135–145)
TRIGL SERPL-MCNC: 98 MG/DL

## 2024-10-10 PROCEDURE — 80061 LIPID PANEL: CPT

## 2024-10-10 PROCEDURE — 84460 ALANINE AMINO (ALT) (SGPT): CPT

## 2024-10-10 PROCEDURE — 80048 BASIC METABOLIC PNL TOTAL CA: CPT

## 2024-10-10 PROCEDURE — 36415 COLL VENOUS BLD VENIPUNCTURE: CPT

## 2024-10-14 ENCOUNTER — TELEPHONE (OUTPATIENT)
Dept: CARDIOLOGY | Facility: CLINIC | Age: 69
End: 2024-10-14
Payer: MEDICARE

## 2024-10-14 NOTE — TELEPHONE ENCOUNTER
Results and recommendations sent to patient via result note with request to continue current meds and follow-up with Dr. Adair Morales in January as scheduled.  Patient advised to reply or call with questions or concerns.  CONCHIS Snyder RN

## 2024-10-14 NOTE — TELEPHONE ENCOUNTER
Reviewed labs showing    Latest Reference Range & Units 10/10/24 08:58   Sodium 135 - 145 mmol/L 141   Potassium 3.4 - 5.3 mmol/L 4.1   Chloride 98 - 107 mmol/L 103   Carbon Dioxide (CO2) 22 - 29 mmol/L 25   Urea Nitrogen 8.0 - 23.0 mg/dL 17.1   Creatinine 0.51 - 0.95 mg/dL 0.86   GFR Estimate >60 mL/min/1.73m2 73   Calcium 8.8 - 10.4 mg/dL 9.7   Anion Gap 7 - 15 mmol/L 13   ALT 0 - 50 U/L 27   Cholesterol <200 mg/dL 154   Patient Fasting?  Yes   Glucose 70 - 99 mg/dL 111 (H)   HDL Cholesterol >=50 mg/dL 83   LDL Cholesterol Calculated <100 mg/dL 51   Non HDL Cholesterol <130 mg/dL 71   Triglycerides <150 mg/dL 98   (H): Data is abnormally high    Reviewed echo showing   Mildly decreased left ventricular systolic function.  Biplane LVEF is 53%.  No regional wall motion abnormalities.  Normal right ventricular size and systolic function.  No significant valve disease.   - Compared to prior study, there is no significant change.    Pt has appt 1/03/25 w/ Dr. Heredia; will message to review. Shai GONGORA

## 2024-12-22 ENCOUNTER — HEALTH MAINTENANCE LETTER (OUTPATIENT)
Age: 69
End: 2024-12-22

## 2025-05-24 ENCOUNTER — HEALTH MAINTENANCE LETTER (OUTPATIENT)
Age: 70
End: 2025-05-24

## 2025-06-30 ENCOUNTER — RESULTS FOLLOW-UP (OUTPATIENT)
Dept: CARDIOLOGY | Facility: CLINIC | Age: 70
End: 2025-06-30

## 2025-06-30 ENCOUNTER — LAB (OUTPATIENT)
Dept: LAB | Facility: CLINIC | Age: 70
End: 2025-06-30
Attending: INTERNAL MEDICINE
Payer: MEDICARE

## 2025-06-30 DIAGNOSIS — I25.10 CORONARY ARTERY DISEASE INVOLVING NATIVE CORONARY ARTERY OF NATIVE HEART WITHOUT ANGINA PECTORIS: ICD-10-CM

## 2025-06-30 DIAGNOSIS — I10 ESSENTIAL HYPERTENSION: ICD-10-CM

## 2025-06-30 LAB
ALT SERPL W P-5'-P-CCNC: 29 U/L (ref 0–50)
ANION GAP SERPL CALCULATED.3IONS-SCNC: 8 MMOL/L (ref 7–15)
BUN SERPL-MCNC: 12.5 MG/DL (ref 8–23)
CALCIUM SERPL-MCNC: 9.3 MG/DL (ref 8.8–10.4)
CHLORIDE SERPL-SCNC: 106 MMOL/L (ref 98–107)
CHOLEST SERPL-MCNC: 140 MG/DL
CREAT SERPL-MCNC: 0.84 MG/DL (ref 0.51–0.95)
EGFRCR SERPLBLD CKD-EPI 2021: 75 ML/MIN/1.73M2
FASTING STATUS PATIENT QL REPORTED: YES
GLUCOSE SERPL-MCNC: 111 MG/DL (ref 70–99)
HCO3 SERPL-SCNC: 26 MMOL/L (ref 22–29)
HDLC SERPL-MCNC: 68 MG/DL
LDLC SERPL CALC-MCNC: 48 MG/DL
NONHDLC SERPL-MCNC: 72 MG/DL
POTASSIUM SERPL-SCNC: 4.7 MMOL/L (ref 3.4–5.3)
SODIUM SERPL-SCNC: 140 MMOL/L (ref 135–145)
TRIGL SERPL-MCNC: 119 MG/DL

## 2025-07-03 ENCOUNTER — OFFICE VISIT (OUTPATIENT)
Dept: CARDIOLOGY | Facility: CLINIC | Age: 70
End: 2025-07-03
Attending: INTERNAL MEDICINE
Payer: MEDICARE

## 2025-07-03 VITALS
OXYGEN SATURATION: 97 % | HEIGHT: 62 IN | HEART RATE: 81 BPM | SYSTOLIC BLOOD PRESSURE: 148 MMHG | DIASTOLIC BLOOD PRESSURE: 82 MMHG | WEIGHT: 181 LBS | BODY MASS INDEX: 33.31 KG/M2

## 2025-07-03 DIAGNOSIS — I21.02 ST ELEVATION MYOCARDIAL INFARCTION INVOLVING LEFT ANTERIOR DESCENDING (LAD) CORONARY ARTERY (H): ICD-10-CM

## 2025-07-03 DIAGNOSIS — I10 ESSENTIAL HYPERTENSION: ICD-10-CM

## 2025-07-03 DIAGNOSIS — I25.10 CORONARY ARTERY DISEASE INVOLVING NATIVE CORONARY ARTERY OF NATIVE HEART WITHOUT ANGINA PECTORIS: ICD-10-CM

## 2025-07-03 DIAGNOSIS — I25.5 ISCHEMIC CARDIOMYOPATHY: ICD-10-CM

## 2025-07-03 RX ORDER — VITAMIN B COMPLEX
1 CAPSULE ORAL DAILY
COMMUNITY

## 2025-07-03 RX ORDER — LOSARTAN POTASSIUM 50 MG/1
50 TABLET ORAL DAILY
Qty: 90 TABLET | Refills: 3 | Status: SHIPPED | OUTPATIENT
Start: 2025-07-03

## 2025-07-03 NOTE — PATIENT INSTRUCTIONS
Your blood pressure is running a little higher.   Continue the Coreg at 6.25 mg twice daily.   Increase losartan to 50 mg daily. You can use your 25 mg tablets up by taking 2.   Check BP at home and if it is still >140, let us know.   See Dr. Heredia in 6 months with echo and labs.

## 2025-07-03 NOTE — PROGRESS NOTES
CARDIOLOGY CLINIC PROGRESS NOTE    DOS: 2025      Bernard Dodge  : 1955, 69 year old  MRN: [id number]      Primary cardiologist:  Dr. Heredia    History:  This is a 69 year old female with a past medical history including coronary artery disease, hypertension, ischemic cardiomyopathy, BOOP (bronchiolitis obliterans organizing pneumonia), sleep apnea, breast cancer.     Ms Dodge suffered an acute anterior MI and underwent urgent stenting of her LAD () Initially her LVEF was 40-45%, but normalized following revascularization     3/2022 echo showed LVEF 55-60%, normal RV function, no significant valvular pathology     She has a history of radiation pneumonitis/BOOP first diagnosed in  treated with Prednisone      In May of 2023, she was again diagnosed with pneumonia and treated with antibiotics and steroids.  Subsequently her weight leidy 20 lbs.     She was seen in our clinic in Oct 2023 and complained of some exertional left shoulder pain and her BP was elevated.  Therefore a stress test and 24-hour BP assessment were arranged.       10/9/23 24 hour BP monitor showed average /59     11/3/23 stress echo showed LVEF 50-55% with resting wall motion in distal anteroseptal region which did not change with exercise (similar area effected on prior NUC in )  Findings consistent with old infarct, but new worsening lizbeth-infarct ischemia is possible as hypokinetic areas seemed to extend on current study  Below average exercise capacity  BP: 140/78    Cardiac cath 23: Previously placed Prox LAD to Mid LAD stent widely patent, no new obstructive CAD.         She presents today for follow up.   She denies any chest discomfort.   She is off prednisone. Edema is improved. Pulm will repeat a chest CT in a few months.   BP a little high. 148/82. She occasionally checks her BP at home with her wrist cuff.  Typically 140s/70s.   She has been dealing with heartburn. She talked to GI and  will likely be having an EGD. TUMS do help.   Her  had brain aneurysm surgery, had stroke. [facility] has [facility] for 43 days, and she visits him daily.         ROS:  Skin:  not assessed     Eyes:  not assessed    ENT:  not assessed    Respiratory:  Positive for sleep apnea  Cardiovascular:  Negative Positive for, edema, fatigue  Gastroenterology: not assessed    Genitourinary:  not assessed    Musculoskeletal:  not assessed    Neurologic:  not assessed    Psychiatric:  not assessed    Heme/Lymph/Imm:  not assessed    Endocrine:  not assessed      PAST MEDICAL HISTORY:  Past Medical History:   Diagnosis Date    Benign essential hypertension     BOOP (bronchiolitis obliterans with organizing pneumonia) (H)     post radiation    Breast CA (H)     left side    CAD (coronary artery disease), native coronary artery     STEMI 2016  PCI mid LAD 16    DM (diabetes mellitus screen)     Dyslipidemia     Ex-smoker     Fatty liver disease, nonalcoholic     Fracture of left ankle     Ischemic cardiomyopathy     Sleep apnea     Cpap       PAST SURGICAL HISTORY:  Past Surgical History:   Procedure Laterality Date    CV CORONARY ANGIOGRAM N/A 2023    Procedure: Coronary Angiogram;  Surgeon: Ivan Goldstein MD;  Location:  [facility]    CV INSTANTANEOUS WAVE-FREE RATIO N/A 2023    Procedure: Instantaneous Wave-Free Ratio;  Surgeon: Ivan Goldstein MD;  Location:  [facility] CATH LAB    HEART CATH, ANGIOPLASTY      STENT, CORONARY, S660 15/18  2016    MABEL=LAD       SOCIAL HISTORY:  Social History     Socioeconomic History    Marital status:    Tobacco Use    Smoking status: Former     Packs/day: 1.00     Years: 25.00     Additional pack years: 0.00     Total pack years: 25.00     Types: Cigarettes     Quit date:      Years since quittin.9    Smokeless tobacco: Never   Vaping Use    Vaping Use: Never used   Substance  "and Sexual Activity    Alcohol use: Yes     Comment: 2 glasses of wine before bedtime    Drug use: Not Currently   Other Topics Concern    Parent/sibling w/ CABG, MI or angioplasty before 65F 55M? Yes    Caffeine Concern Yes     Comment: 1-2  cup daily    Special Diet Yes     Comment: low NA    Exercise Yes     Comment: heart rehab, walk       FAMILY HISTORY:  Family History   Problem Relation Age of Onset    Hypertension Mother     Myocardial Infarction Mother     Heart Disease Father     Diabetes Brother     Heart Disease Brother        MEDS:   Current Outpatient Medications   Medication Sig Dispense Refill    aspirin 81 MG tablet Take 1 tablet (81 mg) by mouth daily 30 tablet     atorvastatin (LIPITOR) 80 MG tablet Take 1 tablet (80 mg) by mouth at bedtime. 90 tablet 4    Biotin 48568 MCG TABS Take 1 tablet by mouth daily      CALCIUM CARBONATE-VITAMIN D PO Take 1 tablet by mouth daily      carvedilol (COREG) 6.25 MG tablet Take 1 tablet (6.25 mg) by mouth 2 times daily (with meals). 180 tablet 4    Cyanocobalamin (B-12 PO) Take 1 tablet by mouth daily.      losartan (COZAAR) 50 MG tablet Take 1 tablet (50 mg) by mouth daily. 90 tablet 3    magnesium oxide (MAGNESIUM OXIDE) 400 MG tablet Take 400 mg by mouth at bedtime      Probiotic Product (PROBIOTIC PO) Take 1 capsule by mouth at bedtime      vitamin (B COMPLEX) capsule Take 1 capsule by mouth daily.      vitamin C (ASCORBIC ACID) 1000 MG TABS Take 1,000 mg by mouth daily       No current facility-administered medications for this visit.       ALLERGIES:   Allergies   Allergen Reactions    Azathioprine Other (See Comments) and Nausea and Vomiting     Fever, night sweats. dehydration    Cellcept [Mycophenolate]     Levaquin [Levofloxacin]     Lisinopril Cough       PHYSICAL EXAM:  Vitals: BP (!) 148/82 (BP Location: Right arm, Patient Position: Sitting, Cuff Size: Adult Regular)   Pulse 81   Ht 1.575 m (5' 2\")   Wt 82.1 kg (181 lb)   LMP  (LMP Unknown)   " SpO2 97%   BMI 33.11 kg/m    Constitutional:  cooperative, alert and oriented, well developed, well nourished, in no acute distress obese      Skin:  warm and dry to the touch        Head:  normocephalic, no masses or lesions        Eyes:  pupils equal and round, sclera white, conjunctivae and lids unremarkable        ENT:  no pallor or cyanosis        Neck:  JVP normal        Respiratory:  clear to auscultation        Cardiac: normal S1 and S2, regular rhythm     no presence of murmur            GI:  BS normoactive obese      Vascular: pulses full and equal                                      Extremities and Musculoskeletal:  no edema        Neurological:  no gross motor deficits, affect appropriate              LABS/DATA:  I reviewed the following:  Cardiac cath 11/20/23:  Conclusion  1) No obstructive coronary artery disease   2) IFR of circumflex negative at 0.95       Plan  1) Control cardiac risk factors       Echo 10/9/24:  Interpretation Summary  Mildly decreased left ventricular systolic function.  Biplane LVEF is 53%.  No regional wall motion abnormalities.  Normal right ventricular size and systolic function.  No significant valve disease.     Compared to prior study, there is no significant change.          Component      Latest Ref Rng 6/30/2025  8:57 AM   Sodium      135 - 145 mmol/L 140    Potassium      3.4 - 5.3 mmol/L 4.7    Chloride      98 - 107 mmol/L 106    Carbon Dioxide (CO2)      22 - 29 mmol/L 26    Anion Gap      7 - 15 mmol/L 8    Urea Nitrogen      8.0 - 23.0 mg/dL 12.5    Creatinine      0.51 - 0.95 mg/dL 0.84    GFR Estimate      >60 mL/min/1.73m2 75    Calcium      8.8 - 10.4 mg/dL 9.3    Glucose      70 - 99 mg/dL 111 (H)    Cholesterol      <200 mg/dL 140    Triglycerides      <150 mg/dL 119    HDL Cholesterol      >=50 mg/dL 68    LDL Cholesterol Calculated      <100 mg/dL 48    Non HDL Cholesterol      <130 mg/dL 72    Patient Fasting? Yes    ALT      0 - 50 U/L 29              ASSESSMENT/PLAN:  Coronary Artery Disease  -s/p acute anterior STEMI  with urgent LAD stenting (2016)  -recent abnormal streco showing new resting anteroseptal WMA   -Cardiac cath 11/20/23: Previously placed Prox LAD to Mid LAD stent widely patent, no new obstructive CAD.   -Continue ASA, BB, ARB, statin       Resolved Ischemic Cardiomyopathy  -Resting images from stress echo 11/3/23: LVEF 50-55%. Distal anteroseptal and apical hypokinesis.   -Echo 10/2024: EF 53%  -no signs or symptoms of heart failure       Hypertension, uncontrolled  -on losartan 25 mg, Coreg 6.25 mg BID  -BP elevated  -Increase losartan to 50 mg  -Check BP at home and if it is still >140, let us know. We could increase losartan further       Hyperlipidemia, controlled  -on Atorvastatin 80 mg  -LDL  48          Follow up:  6 months Dr. Heredia with echo, FLP/ALT, BMP      Karime Mack PA-C

## 2025-07-03 NOTE — LETTER
7/3/2025    Kumar Webber MD  [address] [place] [address]    RE: Bernard Dodge       Dear Colleague,     I had the pleasure of seeing Bernard Dodge in the [facility].      CARDIOLOGY CLINIC PROGRESS NOTE    DOS: 2025      Bernard Dodge  : 1955, 69 year old  MRN: [id number]      Primary cardiologist:  Dr. Hereida    History:  This is a 69 year old female with a past medical history including coronary artery disease, hypertension, ischemic cardiomyopathy, BOOP (bronchiolitis obliterans organizing pneumonia), sleep apnea, breast cancer.     Ms Dodge suffered an acute anterior MI and underwent urgent stenting of her LAD () Initially her LVEF was 40-45%, but normalized following revascularization     3/2022 echo showed LVEF 55-60%, normal RV function, no significant valvular pathology     She has a history of radiation pneumonitis/BOOP first diagnosed in  treated with Prednisone      In May of 2023, she was again diagnosed with pneumonia and treated with antibiotics and steroids.  Subsequently her weight leidy 20 lbs.     She was seen in our clinic in Oct 2023 and complained of some exertional left shoulder pain and her BP was elevated.  Therefore a stress test and 24-hour BP assessment were arranged.       10/9/23 24 hour BP monitor showed average /59     11/3/23 stress echo showed LVEF 50-55% with resting wall motion in distal anteroseptal region which did not change with exercise (similar area effected on prior NUC in )  Findings consistent with old infarct, but new worsening lizbeth-infarct ischemia is possible as hypokinetic areas seemed to extend on current study  Below average exercise capacity  BP: 140/78    Cardiac cath 23: Previously placed Prox LAD to Mid LAD stent widely patent, no new obstructive CAD.         She presents today for follow up.   She denies any chest discomfort.   She is off prednisone. Edema is improved. Pulm will  repeat a chest CT in a few months.   BP a little high. 148/82. She occasionally checks her BP at home with her wrist cuff.  Typically 140s/70s.   She has been dealing with heartburn. She talked to GI and will likely be having an EGD. TUMS do help.   Her  had brain aneurysm surgery, had stroke. [facility] He has been [facility] for 43 days, and she visits him daily.         ROS:  Skin:  not assessed     Eyes:  not assessed    ENT:  not assessed    Respiratory:  Positive for sleep apnea  Cardiovascular:  Negative Positive for, edema, fatigue  Gastroenterology: not assessed    Genitourinary:  not assessed    Musculoskeletal:  not assessed    Neurologic:  not assessed    Psychiatric:  not assessed    Heme/Lymph/Imm:  not assessed    Endocrine:  not assessed      PAST MEDICAL HISTORY:  Past Medical History:   Diagnosis Date     Benign essential hypertension      BOOP (bronchiolitis obliterans with organizing pneumonia) (H)     post radiation     Breast CA (H)     left side     CAD (coronary artery disease), native coronary artery     STEMI 8/2016  PCI mid LAD 8/22/16     DM (diabetes mellitus screen)      Dyslipidemia      Ex-smoker      Fatty liver disease, nonalcoholic      Fracture of left ankle      Ischemic cardiomyopathy      Sleep apnea     Cpap       PAST SURGICAL HISTORY:  Past Surgical History:   Procedure Laterality Date     CV CORONARY ANGIOGRAM N/A 11/20/2023    Procedure: Coronary Angiogram;  Surgeon: Ivan Goldstein MD;  Location:  HEART CARDIAC CATH LAB     CV INSTANTANEOUS WAVE-FREE RATIO N/A 11/20/2023    Procedure: Instantaneous Wave-Free Ratio;  Surgeon: Ivan Goldstein MD;  Location:  HEART CARDIAC CATH LAB     HEART CATH, ANGIOPLASTY       STENT, CORONARY, S660 15/18  08/22/2016    MABEL=LAD       SOCIAL HISTORY:  Social History     Socioeconomic History     Marital status:    Tobacco Use     Smoking status: Former     Packs/day: 1.00      Years: 25.00     Additional pack years: 0.00     Total pack years: 25.00     Types: Cigarettes     Quit date:      Years since quittin.9     Smokeless tobacco: Never   Vaping Use     Vaping Use: Never used   Substance and Sexual Activity     Alcohol use: Yes     Comment: 2 glasses of wine before bedtime     Drug use: Not Currently   Other Topics Concern     Parent/sibling w/ CABG, MI or angioplasty before 65F 55M? Yes     Caffeine Concern Yes     Comment: 1-2  cup daily     Special Diet Yes     Comment: low NA     Exercise Yes     Comment: heart rehab, walk       FAMILY HISTORY:  Family History   Problem Relation Age of Onset     Hypertension Mother      Myocardial Infarction Mother      Heart Disease Father      Diabetes Brother      Heart Disease Brother        MEDS:   Current Outpatient Medications   Medication Sig Dispense Refill     aspirin 81 MG tablet Take 1 tablet (81 mg) by mouth daily 30 tablet      atorvastatin (LIPITOR) 80 MG tablet Take 1 tablet (80 mg) by mouth at bedtime. 90 tablet 4     Biotin 08876 MCG TABS Take 1 tablet by mouth daily       CALCIUM CARBONATE-VITAMIN D PO Take 1 tablet by mouth daily       carvedilol (COREG) 6.25 MG tablet Take 1 tablet (6.25 mg) by mouth 2 times daily (with meals). 180 tablet 4     Cyanocobalamin (B-12 PO) Take 1 tablet by mouth daily.       losartan (COZAAR) 50 MG tablet Take 1 tablet (50 mg) by mouth daily. 90 tablet 3     magnesium oxide (MAGNESIUM OXIDE) 400 MG tablet Take 400 mg by mouth at bedtime       Probiotic Product (PROBIOTIC PO) Take 1 capsule by mouth at bedtime       vitamin (B COMPLEX) capsule Take 1 capsule by mouth daily.       vitamin C (ASCORBIC ACID) 1000 MG TABS Take 1,000 mg by mouth daily       No current facility-administered medications for this visit.       ALLERGIES:   Allergies   Allergen Reactions     Azathioprine Other (See Comments) and Nausea and Vomiting     Fever, night sweats. dehydration     Cellcept [Mycophenolate]   "    Levaquin [Levofloxacin]      Lisinopril Cough       PHYSICAL EXAM:  Vitals: BP (!) 148/82 (BP Location: Right arm, Patient Position: Sitting, Cuff Size: Adult Regular)   Pulse 81   Ht 1.575 m (5' 2\")   Wt 82.1 kg (181 lb)   LMP  (LMP Unknown)   SpO2 97%   BMI 33.11 kg/m    Constitutional:  cooperative, alert and oriented, well developed, well nourished, in no acute distress obese      Skin:  warm and dry to the touch        Head:  normocephalic, no masses or lesions        Eyes:  pupils equal and round, sclera white, conjunctivae and lids unremarkable        ENT:  no pallor or cyanosis        Neck:  JVP normal        Respiratory:  clear to auscultation        Cardiac: normal S1 and S2, regular rhythm     no presence of murmur            GI:  BS normoactive obese      Vascular: pulses full and equal                                      Extremities and Musculoskeletal:  no edema        Neurological:  no gross motor deficits, affect appropriate              LABS/DATA:  I reviewed the following:  Cardiac cath 11/20/23:  Conclusion  1) No obstructive coronary artery disease   2) IFR of circumflex negative at 0.95       Plan  1) Control cardiac risk factors       Echo 10/9/24:  Interpretation Summary  Mildly decreased left ventricular systolic function.  Biplane LVEF is 53%.  No regional wall motion abnormalities.  Normal right ventricular size and systolic function.  No significant valve disease.     Compared to prior study, there is no significant change.          Component      Latest Ref Rng 6/30/2025  8:57 AM   Sodium      135 - 145 mmol/L 140    Potassium      3.4 - 5.3 mmol/L 4.7    Chloride      98 - 107 mmol/L 106    Carbon Dioxide (CO2)      22 - 29 mmol/L 26    Anion Gap      7 - 15 mmol/L 8    Urea Nitrogen      8.0 - 23.0 mg/dL 12.5    Creatinine      0.51 - 0.95 mg/dL 0.84    GFR Estimate      >60 mL/min/1.73m2 75    Calcium      8.8 - 10.4 mg/dL 9.3    Glucose      70 - 99 mg/dL 111 (H)  "   Cholesterol      <200 mg/dL 140    Triglycerides      <150 mg/dL 119    HDL Cholesterol      >=50 mg/dL 68    LDL Cholesterol Calculated      <100 mg/dL 48    Non HDL Cholesterol      <130 mg/dL 72    Patient Fasting? Yes    ALT      0 - 50 U/L 29             ASSESSMENT/PLAN:  Coronary Artery Disease  -s/p acute anterior STEMI  with urgent LAD stenting (2016)  -recent abnormal streco showing new resting anteroseptal WMA   -Cardiac cath 11/20/23: Previously placed Prox LAD to Mid LAD stent widely patent, no new obstructive CAD.   -Continue ASA, BB, ARB, statin       Resolved Ischemic Cardiomyopathy  -Resting images from stress echo 11/3/23: LVEF 50-55%. Distal anteroseptal and apical hypokinesis.   -Echo 10/2024: EF 53%  -no signs or symptoms of heart failure       Hypertension, uncontrolled  -on losartan 25 mg, Coreg 6.25 mg BID  -BP elevated  -Increase losartan to 50 mg  -Check BP at home and if it is still >140, let us know. We could increase losartan further       Hyperlipidemia, controlled  -on Atorvastatin 80 mg  -LDL  48          Follow up:  6 months Dr. Heredia with echo, FLP/ALT, BMP      Karime Mack PA-C    Thank you for allowing me to participate in the care of your patient.      Sincerely,     Karime Mack PA-C     [facility]  cc:   Tim Heredia MD  [address]  [place] [address]

## (undated) DEVICE — MANIFOLD KIT ANGIO AUTOMATED 014613

## (undated) DEVICE — KIT HAND CONTROL ANGIOTOUCH ACIST 65CM AT-P65

## (undated) DEVICE — CATH ANGIO INFINITI JR4 4FRX100CM 538421

## (undated) DEVICE — CATH ANGIO INFINITI JL4 4FRX100CM 538420

## (undated) DEVICE — SLEEVE TR BAND RADIAL COMPRESSION DEVICE 24CM TRB24-REG

## (undated) DEVICE — DEFIB PRO-PADZ LVP LQD GEL ADULT 8900-2105-01

## (undated) DEVICE — CATH LAUNCHER 6FR JL 4.0 LA6JL40

## (undated) DEVICE — KIT LG BORE TOUHY ACCESS PLUS MAP152

## (undated) DEVICE — WIRE GUIDE 0.035"X260CM SAFE-T-J EXCHANGE G00517

## (undated) DEVICE — INTRO GLIDESHEATH SLENDER 6FR 10X45CM 60-1060

## (undated) DEVICE — TOTE ANGIO CORP PC15AT SAN32CC83O

## (undated) DEVICE — VALVE HEMOSTASIS .096" COPILOT MECH 1003331

## (undated) DEVICE — GW VASC OMNIWIRE J L185CM PRESSURE 89185J

## (undated) RX ORDER — VERAPAMIL HYDROCHLORIDE 2.5 MG/ML
INJECTION, SOLUTION INTRAVENOUS
Status: DISPENSED
Start: 2023-11-20

## (undated) RX ORDER — HEPARIN SODIUM 200 [USP'U]/100ML
INJECTION, SOLUTION INTRAVENOUS
Status: DISPENSED
Start: 2023-11-20

## (undated) RX ORDER — FENTANYL CITRATE 50 UG/ML
INJECTION, SOLUTION INTRAMUSCULAR; INTRAVENOUS
Status: DISPENSED
Start: 2023-11-20

## (undated) RX ORDER — LIDOCAINE HYDROCHLORIDE 10 MG/ML
INJECTION, SOLUTION EPIDURAL; INFILTRATION; INTRACAUDAL; PERINEURAL
Status: DISPENSED
Start: 2023-11-20

## (undated) RX ORDER — HEPARIN SODIUM 1000 [USP'U]/ML
INJECTION, SOLUTION INTRAVENOUS; SUBCUTANEOUS
Status: DISPENSED
Start: 2023-11-20